# Patient Record
Sex: FEMALE | Race: WHITE | NOT HISPANIC OR LATINO | Employment: OTHER | URBAN - METROPOLITAN AREA
[De-identification: names, ages, dates, MRNs, and addresses within clinical notes are randomized per-mention and may not be internally consistent; named-entity substitution may affect disease eponyms.]

---

## 2017-01-03 ENCOUNTER — RECORDS - HEALTHEAST (OUTPATIENT)
Dept: BONE DENSITY | Facility: CLINIC | Age: 67
End: 2017-01-03

## 2017-01-03 ENCOUNTER — AMBULATORY - HEALTHEAST (OUTPATIENT)
Dept: LAB | Facility: CLINIC | Age: 67
End: 2017-01-03

## 2017-01-03 DIAGNOSIS — Z51.81 MEDICATION MONITORING ENCOUNTER: ICD-10-CM

## 2017-01-03 DIAGNOSIS — M85.80 OTHER SPECIFIED DISORDERS OF BONE DENSITY AND STRUCTURE, UNSPECIFIED SITE: ICD-10-CM

## 2017-01-03 DIAGNOSIS — E78.00 HYPERCHOLESTEROLEMIA: ICD-10-CM

## 2017-01-03 DIAGNOSIS — E03.9 ACQUIRED HYPOTHYROIDISM: ICD-10-CM

## 2017-01-03 DIAGNOSIS — E55.9 VITAMIN D DEFICIENCY: ICD-10-CM

## 2017-01-03 LAB
CHOLEST SERPL-MCNC: 153 MG/DL
FASTING STATUS PATIENT QL REPORTED: YES
HDLC SERPL-MCNC: 52 MG/DL
LDLC SERPL CALC-MCNC: 71 MG/DL
TRIGL SERPL-MCNC: 152 MG/DL

## 2017-01-09 ENCOUNTER — COMMUNICATION - HEALTHEAST (OUTPATIENT)
Dept: RHEUMATOLOGY | Facility: CLINIC | Age: 67
End: 2017-01-09

## 2017-01-30 ENCOUNTER — OFFICE VISIT - HEALTHEAST (OUTPATIENT)
Dept: RHEUMATOLOGY | Facility: CLINIC | Age: 67
End: 2017-01-30

## 2017-01-30 DIAGNOSIS — M15.0 PRIMARY OSTEOARTHRITIS INVOLVING MULTIPLE JOINTS: ICD-10-CM

## 2017-01-30 DIAGNOSIS — M11.20 CHONDROCALCINOSIS: ICD-10-CM

## 2017-01-30 DIAGNOSIS — M70.62 GREATER TROCHANTERIC BURSITIS OF BOTH HIPS: ICD-10-CM

## 2017-01-30 DIAGNOSIS — M70.61 GREATER TROCHANTERIC BURSITIS OF BOTH HIPS: ICD-10-CM

## 2017-01-30 DIAGNOSIS — M25.50 PAIN IN JOINT: ICD-10-CM

## 2017-02-04 ENCOUNTER — COMMUNICATION - HEALTHEAST (OUTPATIENT)
Dept: RHEUMATOLOGY | Facility: CLINIC | Age: 67
End: 2017-02-04

## 2017-02-04 DIAGNOSIS — M15.0 PRIMARY OSTEOARTHRITIS INVOLVING MULTIPLE JOINTS: ICD-10-CM

## 2017-02-04 DIAGNOSIS — M25.50 PAIN IN JOINT: ICD-10-CM

## 2017-02-13 ENCOUNTER — COMMUNICATION - HEALTHEAST (OUTPATIENT)
Dept: INTERNAL MEDICINE | Facility: CLINIC | Age: 67
End: 2017-02-13

## 2017-02-23 ENCOUNTER — COMMUNICATION - HEALTHEAST (OUTPATIENT)
Dept: INTERNAL MEDICINE | Facility: CLINIC | Age: 67
End: 2017-02-23

## 2017-02-23 DIAGNOSIS — Z51.81 MEDICATION MONITORING ENCOUNTER: ICD-10-CM

## 2017-03-05 ENCOUNTER — COMMUNICATION - HEALTHEAST (OUTPATIENT)
Dept: INTERNAL MEDICINE | Facility: CLINIC | Age: 67
End: 2017-03-05

## 2017-03-05 DIAGNOSIS — I10 HYPERTENSION, ESSENTIAL: ICD-10-CM

## 2017-03-05 DIAGNOSIS — E03.9 ACQUIRED HYPOTHYROIDISM: ICD-10-CM

## 2017-04-07 ENCOUNTER — COMMUNICATION - HEALTHEAST (OUTPATIENT)
Dept: INTERNAL MEDICINE | Facility: CLINIC | Age: 67
End: 2017-04-07

## 2017-04-13 ENCOUNTER — COMMUNICATION - HEALTHEAST (OUTPATIENT)
Dept: INTERNAL MEDICINE | Facility: CLINIC | Age: 67
End: 2017-04-13

## 2017-04-26 ENCOUNTER — COMMUNICATION - HEALTHEAST (OUTPATIENT)
Dept: RHEUMATOLOGY | Facility: CLINIC | Age: 67
End: 2017-04-26

## 2017-04-26 DIAGNOSIS — M25.50 PAIN IN JOINT: ICD-10-CM

## 2017-04-26 DIAGNOSIS — M15.0 PRIMARY OSTEOARTHRITIS INVOLVING MULTIPLE JOINTS: ICD-10-CM

## 2017-05-01 ENCOUNTER — OFFICE VISIT - HEALTHEAST (OUTPATIENT)
Dept: RHEUMATOLOGY | Facility: CLINIC | Age: 67
End: 2017-05-01

## 2017-05-01 DIAGNOSIS — M15.0 PRIMARY OSTEOARTHRITIS INVOLVING MULTIPLE JOINTS: ICD-10-CM

## 2017-05-01 DIAGNOSIS — I25.10 CORONARY ATHEROSCLEROSIS: ICD-10-CM

## 2017-05-01 DIAGNOSIS — M70.62 GREATER TROCHANTERIC BURSITIS OF BOTH HIPS: ICD-10-CM

## 2017-05-01 DIAGNOSIS — M70.61 GREATER TROCHANTERIC BURSITIS OF BOTH HIPS: ICD-10-CM

## 2017-05-01 ASSESSMENT — MIFFLIN-ST. JEOR: SCORE: 1155.56

## 2017-05-16 ENCOUNTER — COMMUNICATION - HEALTHEAST (OUTPATIENT)
Dept: RHEUMATOLOGY | Facility: CLINIC | Age: 67
End: 2017-05-16

## 2017-05-16 DIAGNOSIS — M15.0 PRIMARY OSTEOARTHRITIS INVOLVING MULTIPLE JOINTS: ICD-10-CM

## 2017-05-18 ENCOUNTER — COMMUNICATION - HEALTHEAST (OUTPATIENT)
Dept: INTERNAL MEDICINE | Facility: CLINIC | Age: 67
End: 2017-05-18

## 2017-05-18 DIAGNOSIS — Z51.81 MEDICATION MONITORING ENCOUNTER: ICD-10-CM

## 2017-05-23 ENCOUNTER — COMMUNICATION - HEALTHEAST (OUTPATIENT)
Dept: INTERNAL MEDICINE | Facility: CLINIC | Age: 67
End: 2017-05-23

## 2017-05-23 DIAGNOSIS — Z51.81 MEDICATION MONITORING ENCOUNTER: ICD-10-CM

## 2017-05-28 ENCOUNTER — COMMUNICATION - HEALTHEAST (OUTPATIENT)
Dept: INTERNAL MEDICINE | Facility: CLINIC | Age: 67
End: 2017-05-28

## 2017-05-28 DIAGNOSIS — I10 HYPERTENSION, ESSENTIAL: ICD-10-CM

## 2017-05-28 DIAGNOSIS — E03.9 ACQUIRED HYPOTHYROIDISM: ICD-10-CM

## 2017-05-28 DIAGNOSIS — Z51.81 MEDICATION MONITORING ENCOUNTER: ICD-10-CM

## 2017-05-30 ENCOUNTER — COMMUNICATION - HEALTHEAST (OUTPATIENT)
Dept: INTERNAL MEDICINE | Facility: CLINIC | Age: 67
End: 2017-05-30

## 2017-05-30 DIAGNOSIS — Z51.81 MEDICATION MONITORING ENCOUNTER: ICD-10-CM

## 2017-06-01 ENCOUNTER — COMMUNICATION - HEALTHEAST (OUTPATIENT)
Dept: INTERNAL MEDICINE | Facility: CLINIC | Age: 67
End: 2017-06-01

## 2017-06-01 DIAGNOSIS — E03.9 ACQUIRED HYPOTHYROIDISM: ICD-10-CM

## 2017-06-01 DIAGNOSIS — I10 HYPERTENSION, ESSENTIAL: ICD-10-CM

## 2017-06-01 DIAGNOSIS — Z51.81 MEDICATION MONITORING ENCOUNTER: ICD-10-CM

## 2017-06-14 ENCOUNTER — OFFICE VISIT - HEALTHEAST (OUTPATIENT)
Dept: INTERNAL MEDICINE | Facility: CLINIC | Age: 67
End: 2017-06-14

## 2017-06-14 DIAGNOSIS — R19.7 DIARRHEA: ICD-10-CM

## 2017-06-14 ASSESSMENT — MIFFLIN-ST. JEOR: SCORE: 1124.94

## 2017-06-15 ENCOUNTER — AMBULATORY - HEALTHEAST (OUTPATIENT)
Dept: INTERNAL MEDICINE | Facility: CLINIC | Age: 67
End: 2017-06-15

## 2017-06-15 ENCOUNTER — COMMUNICATION - HEALTHEAST (OUTPATIENT)
Dept: INTERNAL MEDICINE | Facility: CLINIC | Age: 67
End: 2017-06-15

## 2017-06-15 DIAGNOSIS — E03.9 ACQUIRED HYPOTHYROIDISM: ICD-10-CM

## 2017-06-27 ENCOUNTER — RECORDS - HEALTHEAST (OUTPATIENT)
Dept: ADMINISTRATIVE | Facility: OTHER | Age: 67
End: 2017-06-27

## 2017-08-22 ENCOUNTER — COMMUNICATION - HEALTHEAST (OUTPATIENT)
Dept: INTERNAL MEDICINE | Facility: CLINIC | Age: 67
End: 2017-08-22

## 2017-08-22 DIAGNOSIS — Z51.81 MEDICATION MONITORING ENCOUNTER: ICD-10-CM

## 2017-08-28 ENCOUNTER — RECORDS - HEALTHEAST (OUTPATIENT)
Dept: ADMINISTRATIVE | Facility: OTHER | Age: 67
End: 2017-08-28

## 2017-09-11 ENCOUNTER — AMBULATORY - HEALTHEAST (OUTPATIENT)
Dept: LAB | Facility: CLINIC | Age: 67
End: 2017-09-11

## 2017-09-11 DIAGNOSIS — E03.9 ACQUIRED HYPOTHYROIDISM: ICD-10-CM

## 2017-10-05 ENCOUNTER — COMMUNICATION - HEALTHEAST (OUTPATIENT)
Dept: SCHEDULING | Facility: CLINIC | Age: 67
End: 2017-10-05

## 2017-10-05 ENCOUNTER — OFFICE VISIT - HEALTHEAST (OUTPATIENT)
Dept: INTERNAL MEDICINE | Facility: CLINIC | Age: 67
End: 2017-10-05

## 2017-10-05 DIAGNOSIS — R00.2 PALPITATIONS: ICD-10-CM

## 2017-10-05 ASSESSMENT — MIFFLIN-ST. JEOR: SCORE: 1129.47

## 2017-10-10 ENCOUNTER — HOSPITAL ENCOUNTER (OUTPATIENT)
Dept: CARDIOLOGY | Facility: HOSPITAL | Age: 67
Discharge: HOME | End: 2017-10-10
Attending: INTERNAL MEDICINE

## 2017-10-10 DIAGNOSIS — R00.2 PALPITATIONS: ICD-10-CM

## 2017-10-11 ENCOUNTER — COMMUNICATION - HEALTHEAST (OUTPATIENT)
Dept: INTERNAL MEDICINE | Facility: CLINIC | Age: 67
End: 2017-10-11

## 2017-10-26 ENCOUNTER — COMMUNICATION - HEALTHEAST (OUTPATIENT)
Dept: RHEUMATOLOGY | Facility: CLINIC | Age: 67
End: 2017-10-26

## 2017-10-26 ENCOUNTER — COMMUNICATION - HEALTHEAST (OUTPATIENT)
Dept: INTERNAL MEDICINE | Facility: CLINIC | Age: 67
End: 2017-10-26

## 2017-10-26 DIAGNOSIS — Z51.81 MEDICATION MONITORING ENCOUNTER: ICD-10-CM

## 2017-10-26 DIAGNOSIS — M15.0 PRIMARY OSTEOARTHRITIS INVOLVING MULTIPLE JOINTS: ICD-10-CM

## 2017-10-31 ENCOUNTER — OFFICE VISIT - HEALTHEAST (OUTPATIENT)
Dept: RHEUMATOLOGY | Facility: CLINIC | Age: 67
End: 2017-10-31

## 2017-10-31 DIAGNOSIS — I25.10 CORONARY ATHEROSCLEROSIS: ICD-10-CM

## 2017-10-31 DIAGNOSIS — M25.50 PAIN IN JOINT: ICD-10-CM

## 2017-10-31 DIAGNOSIS — M15.0 PRIMARY OSTEOARTHRITIS INVOLVING MULTIPLE JOINTS: ICD-10-CM

## 2017-10-31 DIAGNOSIS — M11.20 CHONDROCALCINOSIS: ICD-10-CM

## 2017-10-31 ASSESSMENT — MIFFLIN-ST. JEOR: SCORE: 1125.84

## 2017-11-08 ENCOUNTER — COMMUNICATION - HEALTHEAST (OUTPATIENT)
Dept: INTERNAL MEDICINE | Facility: CLINIC | Age: 67
End: 2017-11-08

## 2017-11-08 DIAGNOSIS — E03.9 ACQUIRED HYPOTHYROIDISM: ICD-10-CM

## 2017-12-15 ENCOUNTER — RECORDS - HEALTHEAST (OUTPATIENT)
Dept: ADMINISTRATIVE | Facility: OTHER | Age: 67
End: 2017-12-15

## 2017-12-18 ENCOUNTER — RECORDS - HEALTHEAST (OUTPATIENT)
Dept: ADMINISTRATIVE | Facility: OTHER | Age: 67
End: 2017-12-18

## 2017-12-29 ENCOUNTER — COMMUNICATION - HEALTHEAST (OUTPATIENT)
Dept: INTERNAL MEDICINE | Facility: CLINIC | Age: 67
End: 2017-12-29

## 2018-01-09 ENCOUNTER — AMBULATORY - HEALTHEAST (OUTPATIENT)
Dept: INTERNAL MEDICINE | Facility: CLINIC | Age: 68
End: 2018-01-09

## 2018-01-09 ENCOUNTER — COMMUNICATION - HEALTHEAST (OUTPATIENT)
Dept: INTERNAL MEDICINE | Facility: CLINIC | Age: 68
End: 2018-01-09

## 2018-01-09 DIAGNOSIS — I25.10 CORONARY ARTERY DISEASE: ICD-10-CM

## 2018-01-09 DIAGNOSIS — I21.A9 OTHER TYPE OF MYOCARDIAL INFARCTION (H): ICD-10-CM

## 2018-01-09 DIAGNOSIS — M85.80 LOW BONE MASS: ICD-10-CM

## 2018-01-09 DIAGNOSIS — E78.00 HYPERCHOLESTEROLEMIA: ICD-10-CM

## 2018-01-09 DIAGNOSIS — Z51.81 MEDICATION MONITORING ENCOUNTER: ICD-10-CM

## 2018-01-09 DIAGNOSIS — E03.9 ACQUIRED HYPOTHYROIDISM: ICD-10-CM

## 2018-01-22 ENCOUNTER — AMBULATORY - HEALTHEAST (OUTPATIENT)
Dept: LAB | Facility: CLINIC | Age: 68
End: 2018-01-22

## 2018-01-22 DIAGNOSIS — E78.00 HYPERCHOLESTEROLEMIA: ICD-10-CM

## 2018-01-22 DIAGNOSIS — I21.A9 OTHER TYPE OF MYOCARDIAL INFARCTION (H): ICD-10-CM

## 2018-01-22 DIAGNOSIS — E03.9 ACQUIRED HYPOTHYROIDISM: ICD-10-CM

## 2018-01-22 DIAGNOSIS — M85.80 LOW BONE MASS: ICD-10-CM

## 2018-01-22 DIAGNOSIS — I25.10 CORONARY ARTERY DISEASE: ICD-10-CM

## 2018-01-22 LAB
ALBUMIN SERPL-MCNC: 3.7 G/DL (ref 3.5–5)
ALP SERPL-CCNC: 47 U/L (ref 45–120)
ALT SERPL W P-5'-P-CCNC: 24 U/L (ref 0–45)
ANION GAP SERPL CALCULATED.3IONS-SCNC: 10 MMOL/L (ref 5–18)
AST SERPL W P-5'-P-CCNC: 22 U/L (ref 0–40)
BILIRUB SERPL-MCNC: 0.5 MG/DL (ref 0–1)
BUN SERPL-MCNC: 19 MG/DL (ref 8–22)
CALCIUM SERPL-MCNC: 8.8 MG/DL (ref 8.5–10.5)
CHLORIDE BLD-SCNC: 107 MMOL/L (ref 98–107)
CHOLEST SERPL-MCNC: 158 MG/DL
CO2 SERPL-SCNC: 24 MMOL/L (ref 22–31)
CREAT SERPL-MCNC: 0.71 MG/DL (ref 0.6–1.1)
ERYTHROCYTE [DISTWIDTH] IN BLOOD BY AUTOMATED COUNT: 11.4 % (ref 11–14.5)
FASTING STATUS PATIENT QL REPORTED: YES
GFR SERPL CREATININE-BSD FRML MDRD: >60 ML/MIN/1.73M2
GLUCOSE BLD-MCNC: 100 MG/DL (ref 70–125)
HBA1C MFR BLD: 5.8 % (ref 3.5–6)
HCT VFR BLD AUTO: 40.7 % (ref 35–47)
HDLC SERPL-MCNC: 62 MG/DL
HGB BLD-MCNC: 13.6 G/DL (ref 12–16)
LDLC SERPL CALC-MCNC: 69 MG/DL
MCH RBC QN AUTO: 31.2 PG (ref 27–34)
MCHC RBC AUTO-ENTMCNC: 33.4 G/DL (ref 32–36)
MCV RBC AUTO: 94 FL (ref 80–100)
PLATELET # BLD AUTO: 171 THOU/UL (ref 140–440)
PMV BLD AUTO: 8.1 FL (ref 7–10)
POTASSIUM BLD-SCNC: 4.6 MMOL/L (ref 3.5–5)
PROT SERPL-MCNC: 6.4 G/DL (ref 6–8)
RBC # BLD AUTO: 4.34 MILL/UL (ref 3.8–5.4)
SODIUM SERPL-SCNC: 141 MMOL/L (ref 136–145)
TRIGL SERPL-MCNC: 137 MG/DL
TSH SERPL DL<=0.005 MIU/L-ACNC: 1.44 UIU/ML (ref 0.3–5)
WBC: 5 THOU/UL (ref 4–11)

## 2018-01-23 LAB
25(OH)D3 SERPL-MCNC: 41.5 NG/ML (ref 30–80)
25(OH)D3 SERPL-MCNC: 41.5 NG/ML (ref 30–80)

## 2018-01-24 ENCOUNTER — OFFICE VISIT - HEALTHEAST (OUTPATIENT)
Dept: INTERNAL MEDICINE | Facility: CLINIC | Age: 68
End: 2018-01-24

## 2018-01-24 DIAGNOSIS — R00.2 PALPITATIONS: ICD-10-CM

## 2018-01-24 DIAGNOSIS — I25.10 CORONARY ATHEROSCLEROSIS: ICD-10-CM

## 2018-01-24 DIAGNOSIS — Z00.00 PREVENTATIVE HEALTH CARE: ICD-10-CM

## 2018-01-24 DIAGNOSIS — I10 ESSENTIAL HYPERTENSION: ICD-10-CM

## 2018-01-24 DIAGNOSIS — E78.00 HYPERCHOLESTEROLEMIA: ICD-10-CM

## 2018-01-24 DIAGNOSIS — M94.9 DISORDER OF BONE AND CARTILAGE: ICD-10-CM

## 2018-01-24 DIAGNOSIS — E03.9 ACQUIRED HYPOTHYROIDISM: ICD-10-CM

## 2018-01-24 DIAGNOSIS — I77.9 CAROTID ARTERY DISEASE, UNSPECIFIED LATERALITY (H): ICD-10-CM

## 2018-01-24 DIAGNOSIS — Z00.00 ROUTINE HEALTH MAINTENANCE: ICD-10-CM

## 2018-01-24 DIAGNOSIS — M89.9 DISORDER OF BONE AND CARTILAGE: ICD-10-CM

## 2018-01-24 ASSESSMENT — MIFFLIN-ST. JEOR: SCORE: 1124.37

## 2018-03-01 ENCOUNTER — HOSPITAL ENCOUNTER (OUTPATIENT)
Dept: MAMMOGRAPHY | Facility: HOSPITAL | Age: 68
Discharge: HOME | End: 2018-03-01
Attending: INTERNAL MEDICINE

## 2018-03-01 ENCOUNTER — HOSPITAL ENCOUNTER (OUTPATIENT)
Dept: CARDIOLOGY | Facility: HOSPITAL | Age: 68
Discharge: HOME | End: 2018-03-01
Attending: INTERNAL MEDICINE

## 2018-03-01 DIAGNOSIS — I25.10 CORONARY ATHEROSCLEROSIS: ICD-10-CM

## 2018-03-01 DIAGNOSIS — Z12.31 VISIT FOR SCREENING MAMMOGRAM: ICD-10-CM

## 2018-03-01 DIAGNOSIS — R00.2 PALPITATIONS: ICD-10-CM

## 2018-03-01 LAB
AORTIC ROOT: 2.9 CM
BSA FOR ECHO PROCEDURE: 1.71 M2
CV BLOOD PRESSURE: NORMAL MMHG
CV ECHO HEIGHT: 61 IN
CV ECHO WEIGHT: 150 LBS
DOP CALC LVOT AREA: 2.54 CM2
DOP CALC LVOT DIAMETER: 1.8 CM
DOP CALC LVOT STROKE VOLUME: 46.3 CM3
DOP CALCLVOT PEAK VEL VTI: 18.2 CM
EJECTION FRACTION: 63 % (ref 55–75)
FRACTIONAL SHORTENING: 38.3 % (ref 28–44)
INTERVENTRICULAR SEPTUM IN END DIASTOLE: 1 CM (ref 0.6–0.9)
IVS/PW RATIO: 1
LA AREA 1: 17.7 CM2
LA AREA 2: 17.3 CM2
LEFT ATRIUM LENGTH: 4.67 CM
LEFT ATRIUM SIZE: 3.4 CM
LEFT ATRIUM TO AORTIC ROOT RATIO: 1.17 NO UNITS
LEFT ATRIUM VOLUME INDEX: 32.6 ML/M2
LEFT ATRIUM VOLUME: 55.7 ML
LEFT VENTRICLE CARDIAC INDEX: 2.1 L/MIN/M2
LEFT VENTRICLE CARDIAC OUTPUT: 3.6 L/MIN
LEFT VENTRICLE DIASTOLIC VOLUME INDEX: 42.7 CM3/M2 (ref 34–74)
LEFT VENTRICLE DIASTOLIC VOLUME: 73 CM3 (ref 46–106)
LEFT VENTRICLE HEART RATE: 77 BPM
LEFT VENTRICLE MASS INDEX: 96.2 G/M2
LEFT VENTRICLE SYSTOLIC VOLUME INDEX: 15.8 CM3/M2 (ref 11–31)
LEFT VENTRICLE SYSTOLIC VOLUME: 27 CM3 (ref 14–42)
LEFT VENTRICULAR INTERNAL DIMENSION IN DIASTOLE: 4.7 CM (ref 3.8–5.2)
LEFT VENTRICULAR INTERNAL DIMENSION IN SYSTOLE: 2.9 CM (ref 2.2–3.5)
LEFT VENTRICULAR MASS: 164.5 G
LEFT VENTRICULAR OUTFLOW TRACT MEAN GRADIENT: 1 MMHG
LEFT VENTRICULAR OUTFLOW TRACT MEAN VELOCITY: 55.7 CM/S
LEFT VENTRICULAR POSTERIOR WALL IN END DIASTOLE: 1 CM (ref 0.6–0.9)
LV STROKE VOLUME INDEX: 27.1 ML/M2
MITRAL VALVE E/A RATIO: 1
MV AVERAGE E/E' RATIO: 14.2 CM/S
MV DECELERATION TIME: 234 MS
MV E'TISSUE VEL-LAT: 8.33 CM/S
MV E'TISSUE VEL-MED: 6.43 CM/S
MV LATERAL E/E' RATIO: 12.6
MV MEDIAL E/E' RATIO: 16.3
MV PEAK A VELOCITY: 108 CM/S
MV PEAK E VELOCITY: 105 CM/S
NUC REST DIASTOLIC VOLUME INDEX: 2400 LBS
NUC REST SYSTOLIC VOLUME INDEX: 61 IN
RIGHT VENTRICULAR INTERNAL DIMENSION IN DYSTOLE: 3 CM
TRICUSPID REGURGITATION PEAK PRESSURE GRADIENT: 30 MMHG
TRICUSPID VALVE ANULAR PLANE SYSTOLIC EXCURSION: 2.1 CM
TRICUSPID VALVE PEAK REGURGITANT VELOCITY: 274 CM/S

## 2018-03-01 ASSESSMENT — MIFFLIN-ST. JEOR: SCORE: 1127.78

## 2018-04-04 ENCOUNTER — RECORDS - HEALTHEAST (OUTPATIENT)
Dept: ADMINISTRATIVE | Facility: OTHER | Age: 68
End: 2018-04-04

## 2018-04-25 ENCOUNTER — RECORDS - HEALTHEAST (OUTPATIENT)
Dept: ADMINISTRATIVE | Facility: OTHER | Age: 68
End: 2018-04-25

## 2018-04-30 ENCOUNTER — RECORDS - HEALTHEAST (OUTPATIENT)
Dept: ADMINISTRATIVE | Facility: OTHER | Age: 68
End: 2018-04-30

## 2018-05-07 ENCOUNTER — COMMUNICATION - HEALTHEAST (OUTPATIENT)
Dept: INTERNAL MEDICINE | Facility: CLINIC | Age: 68
End: 2018-05-07

## 2018-05-29 ENCOUNTER — HOSPITAL ENCOUNTER (OUTPATIENT)
Dept: CT IMAGING | Facility: CLINIC | Age: 68
Discharge: HOME | End: 2018-05-29
Attending: INTERNAL MEDICINE

## 2018-05-29 ENCOUNTER — COMMUNICATION - HEALTHEAST (OUTPATIENT)
Dept: INTERNAL MEDICINE | Facility: CLINIC | Age: 68
End: 2018-05-29

## 2018-05-29 ENCOUNTER — OFFICE VISIT - HEALTHEAST (OUTPATIENT)
Dept: INTERNAL MEDICINE | Facility: CLINIC | Age: 68
End: 2018-05-29

## 2018-05-29 DIAGNOSIS — R10.9 LEFT FLANK PAIN: ICD-10-CM

## 2018-05-29 LAB
ALBUMIN UR-MCNC: NEGATIVE MG/DL
ANION GAP SERPL CALCULATED.3IONS-SCNC: 10 MMOL/L (ref 5–18)
APPEARANCE UR: CLEAR
BACTERIA #/AREA URNS HPF: ABNORMAL HPF
BILIRUB UR QL STRIP: NEGATIVE
BUN SERPL-MCNC: 15 MG/DL (ref 8–22)
CALCIUM SERPL-MCNC: 10 MG/DL (ref 8.5–10.5)
CHLORIDE BLD-SCNC: 106 MMOL/L (ref 98–107)
CO2 SERPL-SCNC: 24 MMOL/L (ref 22–31)
COLOR UR AUTO: YELLOW
CREAT SERPL-MCNC: 0.73 MG/DL (ref 0.6–1.1)
ERYTHROCYTE [DISTWIDTH] IN BLOOD BY AUTOMATED COUNT: 10.8 % (ref 11–14.5)
GFR SERPL CREATININE-BSD FRML MDRD: >60 ML/MIN/1.73M2
GLUCOSE BLD-MCNC: 108 MG/DL (ref 70–125)
GLUCOSE UR STRIP-MCNC: NEGATIVE MG/DL
HCT VFR BLD AUTO: 43.9 % (ref 35–47)
HGB BLD-MCNC: 14.9 G/DL (ref 12–16)
HGB UR QL STRIP: ABNORMAL
KETONES UR STRIP-MCNC: NEGATIVE MG/DL
LEUKOCYTE ESTERASE UR QL STRIP: NEGATIVE
MCH RBC QN AUTO: 32.2 PG (ref 27–34)
MCHC RBC AUTO-ENTMCNC: 34 G/DL (ref 32–36)
MCV RBC AUTO: 95 FL (ref 80–100)
NITRATE UR QL: NEGATIVE
PH UR STRIP: 6 [PH] (ref 5–8)
PLATELET # BLD AUTO: 208 THOU/UL (ref 140–440)
PMV BLD AUTO: 7.9 FL (ref 7–10)
POTASSIUM BLD-SCNC: 4.7 MMOL/L (ref 3.5–5)
RBC # BLD AUTO: 4.63 MILL/UL (ref 3.8–5.4)
RBC #/AREA URNS AUTO: ABNORMAL HPF
SODIUM SERPL-SCNC: 140 MMOL/L (ref 136–145)
SP GR UR STRIP: 1.02 (ref 1–1.03)
UROBILINOGEN UR STRIP-ACNC: ABNORMAL
WBC #/AREA URNS AUTO: ABNORMAL HPF
WBC: 4.5 THOU/UL (ref 4–11)

## 2018-05-31 ENCOUNTER — RECORDS - HEALTHEAST (OUTPATIENT)
Dept: ADMINISTRATIVE | Facility: OTHER | Age: 68
End: 2018-05-31

## 2018-06-05 ENCOUNTER — COMMUNICATION - HEALTHEAST (OUTPATIENT)
Dept: INTERNAL MEDICINE | Facility: CLINIC | Age: 68
End: 2018-06-05

## 2018-06-18 ENCOUNTER — RECORDS - HEALTHEAST (OUTPATIENT)
Dept: ADMINISTRATIVE | Facility: OTHER | Age: 68
End: 2018-06-18

## 2018-06-19 ENCOUNTER — COMMUNICATION - HEALTHEAST (OUTPATIENT)
Dept: INTERNAL MEDICINE | Facility: CLINIC | Age: 68
End: 2018-06-19

## 2018-06-19 ENCOUNTER — RECORDS - HEALTHEAST (OUTPATIENT)
Dept: ADMINISTRATIVE | Facility: OTHER | Age: 68
End: 2018-06-19

## 2018-06-20 ENCOUNTER — COMMUNICATION - HEALTHEAST (OUTPATIENT)
Dept: INTERNAL MEDICINE | Facility: CLINIC | Age: 68
End: 2018-06-20

## 2018-06-20 ENCOUNTER — RECORDS - HEALTHEAST (OUTPATIENT)
Dept: ADMINISTRATIVE | Facility: OTHER | Age: 68
End: 2018-06-20

## 2018-07-10 ENCOUNTER — RECORDS - HEALTHEAST (OUTPATIENT)
Dept: ADMINISTRATIVE | Facility: OTHER | Age: 68
End: 2018-07-10

## 2018-07-16 ENCOUNTER — COMMUNICATION - HEALTHEAST (OUTPATIENT)
Dept: INTERNAL MEDICINE | Facility: CLINIC | Age: 68
End: 2018-07-16

## 2018-08-26 ENCOUNTER — COMMUNICATION - HEALTHEAST (OUTPATIENT)
Dept: INTERNAL MEDICINE | Facility: CLINIC | Age: 68
End: 2018-08-26

## 2018-10-25 ENCOUNTER — COMMUNICATION - HEALTHEAST (OUTPATIENT)
Dept: INTERNAL MEDICINE | Facility: CLINIC | Age: 68
End: 2018-10-25

## 2018-10-29 ENCOUNTER — OFFICE VISIT - HEALTHEAST (OUTPATIENT)
Dept: INTERNAL MEDICINE | Facility: CLINIC | Age: 68
End: 2018-10-29

## 2018-10-29 DIAGNOSIS — R10.84 ABDOMINAL PAIN, GENERALIZED: ICD-10-CM

## 2018-10-29 DIAGNOSIS — M54.50 ACUTE LEFT-SIDED LOW BACK PAIN WITHOUT SCIATICA: ICD-10-CM

## 2018-10-29 ASSESSMENT — MIFFLIN-ST. JEOR: SCORE: 1118.7

## 2018-11-02 ENCOUNTER — COMMUNICATION - HEALTHEAST (OUTPATIENT)
Dept: INTERNAL MEDICINE | Facility: CLINIC | Age: 68
End: 2018-11-02

## 2018-11-07 ENCOUNTER — RECORDS - HEALTHEAST (OUTPATIENT)
Dept: ADMINISTRATIVE | Facility: OTHER | Age: 68
End: 2018-11-07

## 2018-12-14 ENCOUNTER — COMMUNICATION - HEALTHEAST (OUTPATIENT)
Dept: INTERNAL MEDICINE | Facility: CLINIC | Age: 68
End: 2018-12-14

## 2018-12-14 DIAGNOSIS — Z79.899 ENCOUNTER FOR LONG-TERM (CURRENT) USE OF MEDICATIONS: ICD-10-CM

## 2018-12-14 DIAGNOSIS — Z00.00 PREVENTATIVE HEALTH CARE: ICD-10-CM

## 2018-12-14 DIAGNOSIS — E03.9 ACQUIRED HYPOTHYROIDISM: ICD-10-CM

## 2019-01-02 ENCOUNTER — COMMUNICATION - HEALTHEAST (OUTPATIENT)
Dept: INTERNAL MEDICINE | Facility: CLINIC | Age: 69
End: 2019-01-02

## 2019-01-16 ENCOUNTER — AMBULATORY - HEALTHEAST (OUTPATIENT)
Dept: LAB | Facility: CLINIC | Age: 69
End: 2019-01-16

## 2019-01-16 DIAGNOSIS — Z00.00 PREVENTATIVE HEALTH CARE: ICD-10-CM

## 2019-01-16 DIAGNOSIS — Z79.899 ENCOUNTER FOR LONG-TERM (CURRENT) USE OF MEDICATIONS: ICD-10-CM

## 2019-01-16 DIAGNOSIS — E03.9 ACQUIRED HYPOTHYROIDISM: ICD-10-CM

## 2019-01-16 LAB
ALBUMIN SERPL-MCNC: 4.1 G/DL (ref 3.5–5)
ALP SERPL-CCNC: 70 U/L (ref 45–120)
ALT SERPL W P-5'-P-CCNC: 48 U/L (ref 0–45)
ANION GAP SERPL CALCULATED.3IONS-SCNC: 11 MMOL/L (ref 5–18)
AST SERPL W P-5'-P-CCNC: 44 U/L (ref 0–40)
BILIRUB SERPL-MCNC: 0.5 MG/DL (ref 0–1)
BUN SERPL-MCNC: 17 MG/DL (ref 8–28)
CALCIUM SERPL-MCNC: 9.2 MG/DL (ref 8.5–10.5)
CHLORIDE BLD-SCNC: 103 MMOL/L (ref 98–107)
CHOLEST SERPL-MCNC: 148 MG/DL
CO2 SERPL-SCNC: 24 MMOL/L (ref 22–31)
CREAT SERPL-MCNC: 0.73 MG/DL (ref 0.6–1.1)
ERYTHROCYTE [DISTWIDTH] IN BLOOD BY AUTOMATED COUNT: 10.6 % (ref 11–14.5)
FASTING STATUS PATIENT QL REPORTED: YES
GFR SERPL CREATININE-BSD FRML MDRD: >60 ML/MIN/1.73M2
GLUCOSE BLD-MCNC: 107 MG/DL (ref 70–125)
HBA1C MFR BLD: 5.7 % (ref 3.5–6)
HCT VFR BLD AUTO: 42.1 % (ref 35–47)
HDLC SERPL-MCNC: 59 MG/DL
HGB BLD-MCNC: 14.1 G/DL (ref 12–16)
LDLC SERPL CALC-MCNC: 64 MG/DL
MCH RBC QN AUTO: 31.2 PG (ref 27–34)
MCHC RBC AUTO-ENTMCNC: 33.4 G/DL (ref 32–36)
MCV RBC AUTO: 93 FL (ref 80–100)
PLATELET # BLD AUTO: 146 THOU/UL (ref 140–440)
PMV BLD AUTO: 9.1 FL (ref 7–10)
POTASSIUM BLD-SCNC: 4.1 MMOL/L (ref 3.5–5)
PROT SERPL-MCNC: 6.7 G/DL (ref 6–8)
RBC # BLD AUTO: 4.5 MILL/UL (ref 3.8–5.4)
SODIUM SERPL-SCNC: 138 MMOL/L (ref 136–145)
TRIGL SERPL-MCNC: 123 MG/DL
TSH SERPL DL<=0.005 MIU/L-ACNC: 0.34 UIU/ML (ref 0.3–5)
WBC: 3.6 THOU/UL (ref 4–11)

## 2019-01-17 LAB
25(OH)D3 SERPL-MCNC: 29.4 NG/ML (ref 30–80)
25(OH)D3 SERPL-MCNC: 29.4 NG/ML (ref 30–80)

## 2019-01-22 ENCOUNTER — OFFICE VISIT - HEALTHEAST (OUTPATIENT)
Dept: INTERNAL MEDICINE | Facility: CLINIC | Age: 69
End: 2019-01-22

## 2019-01-22 DIAGNOSIS — E55.9 VITAMIN D DEFICIENCY: ICD-10-CM

## 2019-01-22 DIAGNOSIS — Z00.00 PREVENTATIVE HEALTH CARE: ICD-10-CM

## 2019-01-22 DIAGNOSIS — E78.00 HYPERCHOLESTEROLEMIA: ICD-10-CM

## 2019-01-22 DIAGNOSIS — I10 ESSENTIAL HYPERTENSION: ICD-10-CM

## 2019-01-22 DIAGNOSIS — M94.9 DISORDER OF BONE AND CARTILAGE: ICD-10-CM

## 2019-01-22 DIAGNOSIS — M89.9 DISORDER OF BONE AND CARTILAGE: ICD-10-CM

## 2019-01-22 DIAGNOSIS — I65.29 STENOSIS OF CAROTID ARTERY, UNSPECIFIED LATERALITY: ICD-10-CM

## 2019-01-22 DIAGNOSIS — J20.9 ACUTE BRONCHITIS, UNSPECIFIED ORGANISM: ICD-10-CM

## 2019-01-22 DIAGNOSIS — R79.89 ELEVATED LFTS: ICD-10-CM

## 2019-01-22 DIAGNOSIS — E03.9 ACQUIRED HYPOTHYROIDISM: ICD-10-CM

## 2019-01-22 DIAGNOSIS — G89.29 OTHER CHRONIC PAIN: ICD-10-CM

## 2019-01-22 DIAGNOSIS — Z78.0 MENOPAUSE: ICD-10-CM

## 2019-01-22 LAB
ALBUMIN SERPL-MCNC: 3.9 G/DL (ref 3.5–5)
ALP SERPL-CCNC: 69 U/L (ref 45–120)
ALT SERPL W P-5'-P-CCNC: 27 U/L (ref 0–45)
AST SERPL W P-5'-P-CCNC: 21 U/L (ref 0–40)
BILIRUB DIRECT SERPL-MCNC: 0.1 MG/DL
BILIRUB SERPL-MCNC: 0.4 MG/DL (ref 0–1)
PROT SERPL-MCNC: 6.5 G/DL (ref 6–8)

## 2019-01-22 ASSESSMENT — MIFFLIN-ST. JEOR: SCORE: 1119.84

## 2019-01-28 ENCOUNTER — RECORDS - HEALTHEAST (OUTPATIENT)
Dept: ADMINISTRATIVE | Facility: OTHER | Age: 69
End: 2019-01-28

## 2019-01-28 ENCOUNTER — RECORDS - HEALTHEAST (OUTPATIENT)
Dept: BONE DENSITY | Facility: CLINIC | Age: 69
End: 2019-01-28

## 2019-01-28 DIAGNOSIS — M94.9 DISORDER OF CARTILAGE, UNSPECIFIED: ICD-10-CM

## 2019-01-28 DIAGNOSIS — Z78.0 ASYMPTOMATIC MENOPAUSAL STATE: ICD-10-CM

## 2019-01-28 DIAGNOSIS — M89.9 DISORDER OF BONE, UNSPECIFIED: ICD-10-CM

## 2019-02-16 ENCOUNTER — COMMUNICATION - HEALTHEAST (OUTPATIENT)
Dept: INTERNAL MEDICINE | Facility: CLINIC | Age: 69
End: 2019-02-16

## 2019-02-16 DIAGNOSIS — I10 ESSENTIAL HYPERTENSION: ICD-10-CM

## 2019-02-16 DIAGNOSIS — E03.9 ACQUIRED HYPOTHYROIDISM: ICD-10-CM

## 2019-02-16 DIAGNOSIS — E78.00 HYPERCHOLESTEROLEMIA: ICD-10-CM

## 2019-02-22 ENCOUNTER — COMMUNICATION - HEALTHEAST (OUTPATIENT)
Dept: INTERNAL MEDICINE | Facility: CLINIC | Age: 69
End: 2019-02-22

## 2019-03-06 ENCOUNTER — COMMUNICATION - HEALTHEAST (OUTPATIENT)
Dept: INTERNAL MEDICINE | Facility: CLINIC | Age: 69
End: 2019-03-06

## 2019-03-06 DIAGNOSIS — Z12.31 ENCOUNTER FOR SCREENING MAMMOGRAM FOR BREAST CANCER: ICD-10-CM

## 2019-03-25 ENCOUNTER — HOSPITAL ENCOUNTER (OUTPATIENT)
Dept: MAMMOGRAPHY | Facility: CLINIC | Age: 69
Discharge: HOME | End: 2019-03-25
Attending: INTERNAL MEDICINE

## 2019-03-25 DIAGNOSIS — Z12.31 ENCOUNTER FOR SCREENING MAMMOGRAM FOR BREAST CANCER: ICD-10-CM

## 2019-04-23 ENCOUNTER — RECORDS - HEALTHEAST (OUTPATIENT)
Dept: ADMINISTRATIVE | Facility: OTHER | Age: 69
End: 2019-04-23

## 2019-05-01 ENCOUNTER — RECORDS - HEALTHEAST (OUTPATIENT)
Dept: ADMINISTRATIVE | Facility: OTHER | Age: 69
End: 2019-05-01

## 2019-05-08 ENCOUNTER — RECORDS - HEALTHEAST (OUTPATIENT)
Dept: ADMINISTRATIVE | Facility: OTHER | Age: 69
End: 2019-05-08

## 2019-05-12 ENCOUNTER — COMMUNICATION - HEALTHEAST (OUTPATIENT)
Dept: INTERNAL MEDICINE | Facility: CLINIC | Age: 69
End: 2019-05-12

## 2019-05-12 DIAGNOSIS — M15.0 PRIMARY OSTEOARTHRITIS INVOLVING MULTIPLE JOINTS: ICD-10-CM

## 2019-07-19 ENCOUNTER — OFFICE VISIT - HEALTHEAST (OUTPATIENT)
Dept: INTERNAL MEDICINE | Facility: CLINIC | Age: 69
End: 2019-07-19

## 2019-07-19 DIAGNOSIS — E55.9 VITAMIN D DEFICIENCY: ICD-10-CM

## 2019-07-19 DIAGNOSIS — E78.00 HYPERCHOLESTEROLEMIA: ICD-10-CM

## 2019-07-19 DIAGNOSIS — I65.29 STENOSIS OF CAROTID ARTERY, UNSPECIFIED LATERALITY: ICD-10-CM

## 2019-07-19 DIAGNOSIS — I10 ESSENTIAL HYPERTENSION: ICD-10-CM

## 2019-07-19 DIAGNOSIS — I25.83 CORONARY ATHEROSCLEROSIS DUE TO LIPID RICH PLAQUE: ICD-10-CM

## 2019-07-19 DIAGNOSIS — N95.2 ATROPHIC VAGINITIS: ICD-10-CM

## 2019-07-19 DIAGNOSIS — I77.9 CAROTID ARTERY DISEASE, UNSPECIFIED LATERALITY (H): ICD-10-CM

## 2019-07-19 ASSESSMENT — MIFFLIN-ST. JEOR: SCORE: 1118.13

## 2019-08-14 ENCOUNTER — RECORDS - HEALTHEAST (OUTPATIENT)
Dept: ADMINISTRATIVE | Facility: OTHER | Age: 69
End: 2019-08-14

## 2019-09-03 ENCOUNTER — COMMUNICATION - HEALTHEAST (OUTPATIENT)
Dept: INTERNAL MEDICINE | Facility: CLINIC | Age: 69
End: 2019-09-03

## 2019-10-02 ENCOUNTER — COMMUNICATION - HEALTHEAST (OUTPATIENT)
Dept: INTERNAL MEDICINE | Facility: CLINIC | Age: 69
End: 2019-10-02

## 2019-10-02 DIAGNOSIS — J02.9 SORE THROAT: ICD-10-CM

## 2019-10-03 ENCOUNTER — AMBULATORY - HEALTHEAST (OUTPATIENT)
Dept: NURSING | Facility: CLINIC | Age: 69
End: 2019-10-03

## 2019-10-03 DIAGNOSIS — J02.9 SORE THROAT: ICD-10-CM

## 2019-10-03 LAB — DEPRECATED S PYO AG THROAT QL EIA: NORMAL

## 2019-10-04 LAB — GROUP A STREP BY PCR: NORMAL

## 2019-11-11 ENCOUNTER — RECORDS - HEALTHEAST (OUTPATIENT)
Dept: ADMINISTRATIVE | Facility: OTHER | Age: 69
End: 2019-11-11

## 2019-11-20 ENCOUNTER — RECORDS - HEALTHEAST (OUTPATIENT)
Dept: ADMINISTRATIVE | Facility: OTHER | Age: 69
End: 2019-11-20

## 2019-11-25 ENCOUNTER — COMMUNICATION - HEALTHEAST (OUTPATIENT)
Dept: INTERNAL MEDICINE | Facility: CLINIC | Age: 69
End: 2019-11-25

## 2019-11-25 DIAGNOSIS — M15.0 PRIMARY OSTEOARTHRITIS INVOLVING MULTIPLE JOINTS: ICD-10-CM

## 2020-01-17 ENCOUNTER — COMMUNICATION - HEALTHEAST (OUTPATIENT)
Dept: INTERNAL MEDICINE | Facility: CLINIC | Age: 70
End: 2020-01-17

## 2020-01-17 DIAGNOSIS — I65.29 STENOSIS OF CAROTID ARTERY, UNSPECIFIED LATERALITY: ICD-10-CM

## 2020-01-17 DIAGNOSIS — E55.9 VITAMIN D DEFICIENCY: ICD-10-CM

## 2020-01-17 DIAGNOSIS — E03.9 ACQUIRED HYPOTHYROIDISM: ICD-10-CM

## 2020-01-17 DIAGNOSIS — I25.83 CORONARY ATHEROSCLEROSIS DUE TO LIPID RICH PLAQUE: ICD-10-CM

## 2020-01-17 DIAGNOSIS — R73.01 ELEVATED FASTING GLUCOSE: ICD-10-CM

## 2020-01-17 DIAGNOSIS — Z79.899 ENCOUNTER FOR LONG-TERM (CURRENT) USE OF MEDICATIONS: ICD-10-CM

## 2020-01-24 ENCOUNTER — COMMUNICATION - HEALTHEAST (OUTPATIENT)
Dept: INTERNAL MEDICINE | Facility: CLINIC | Age: 70
End: 2020-01-24

## 2020-01-24 DIAGNOSIS — M25.50 ARTHRALGIA, UNSPECIFIED JOINT: ICD-10-CM

## 2020-01-29 ENCOUNTER — AMBULATORY - HEALTHEAST (OUTPATIENT)
Dept: LAB | Facility: CLINIC | Age: 70
End: 2020-01-29

## 2020-01-29 ENCOUNTER — COMMUNICATION - HEALTHEAST (OUTPATIENT)
Dept: INTERNAL MEDICINE | Facility: CLINIC | Age: 70
End: 2020-01-29

## 2020-01-29 DIAGNOSIS — Z79.899 ENCOUNTER FOR LONG-TERM (CURRENT) USE OF MEDICATIONS: ICD-10-CM

## 2020-01-29 DIAGNOSIS — E55.9 VITAMIN D DEFICIENCY: ICD-10-CM

## 2020-01-29 DIAGNOSIS — E03.9 ACQUIRED HYPOTHYROIDISM: ICD-10-CM

## 2020-01-29 DIAGNOSIS — I25.83 CORONARY ATHEROSCLEROSIS DUE TO LIPID RICH PLAQUE: ICD-10-CM

## 2020-01-29 DIAGNOSIS — R73.01 ELEVATED FASTING GLUCOSE: ICD-10-CM

## 2020-01-29 LAB
ALBUMIN SERPL-MCNC: 4.1 G/DL (ref 3.5–5)
ALP SERPL-CCNC: 57 U/L (ref 45–120)
ALT SERPL W P-5'-P-CCNC: 26 U/L (ref 0–45)
ANION GAP SERPL CALCULATED.3IONS-SCNC: 10 MMOL/L (ref 5–18)
AST SERPL W P-5'-P-CCNC: 22 U/L (ref 0–40)
BILIRUB SERPL-MCNC: 0.6 MG/DL (ref 0–1)
BUN SERPL-MCNC: 15 MG/DL (ref 8–28)
CALCIUM SERPL-MCNC: 8.9 MG/DL (ref 8.5–10.5)
CHLORIDE BLD-SCNC: 104 MMOL/L (ref 98–107)
CHOLEST SERPL-MCNC: 155 MG/DL
CO2 SERPL-SCNC: 25 MMOL/L (ref 22–31)
CREAT SERPL-MCNC: 0.71 MG/DL (ref 0.6–1.1)
ERYTHROCYTE [DISTWIDTH] IN BLOOD BY AUTOMATED COUNT: 12 % (ref 11–14.5)
FASTING STATUS PATIENT QL REPORTED: YES
GFR SERPL CREATININE-BSD FRML MDRD: >60 ML/MIN/1.73M2
GLUCOSE BLD-MCNC: 106 MG/DL (ref 70–125)
HBA1C MFR BLD: 5.7 % (ref 3.5–6)
HCT VFR BLD AUTO: 40.1 % (ref 35–47)
HDLC SERPL-MCNC: 58 MG/DL
HGB BLD-MCNC: 13.3 G/DL (ref 12–16)
LDLC SERPL CALC-MCNC: 71 MG/DL
MCH RBC QN AUTO: 32 PG (ref 27–34)
MCHC RBC AUTO-ENTMCNC: 33.2 G/DL (ref 32–36)
MCV RBC AUTO: 96 FL (ref 80–100)
PLATELET # BLD AUTO: 169 THOU/UL (ref 140–440)
PMV BLD AUTO: 11.4 FL (ref 8.5–12.5)
POTASSIUM BLD-SCNC: 4.3 MMOL/L (ref 3.5–5)
PROT SERPL-MCNC: 6.6 G/DL (ref 6–8)
RBC # BLD AUTO: 4.16 MILL/UL (ref 3.8–5.4)
SODIUM SERPL-SCNC: 139 MMOL/L (ref 136–145)
TRIGL SERPL-MCNC: 131 MG/DL
TSH SERPL DL<=0.005 MIU/L-ACNC: 0.1 UIU/ML (ref 0.3–5)
WBC: 4.8 THOU/UL (ref 4–11)

## 2020-01-30 LAB
25(OH)D3 SERPL-MCNC: 55.2 NG/ML (ref 30–80)
25(OH)D3 SERPL-MCNC: 55.2 NG/ML (ref 30–80)

## 2020-02-05 ENCOUNTER — OFFICE VISIT - HEALTHEAST (OUTPATIENT)
Dept: INTERNAL MEDICINE | Facility: CLINIC | Age: 70
End: 2020-02-05

## 2020-02-05 DIAGNOSIS — N39.498 OTHER URINARY INCONTINENCE: ICD-10-CM

## 2020-02-05 DIAGNOSIS — Z29.9 ENCOUNTER FOR PREVENTIVE MEASURE: ICD-10-CM

## 2020-02-05 DIAGNOSIS — E78.00 HYPERCHOLESTEROLEMIA: ICD-10-CM

## 2020-02-05 DIAGNOSIS — I10 ESSENTIAL HYPERTENSION: ICD-10-CM

## 2020-02-05 DIAGNOSIS — H93.13 TINNITUS, BILATERAL: ICD-10-CM

## 2020-02-05 DIAGNOSIS — N95.2 ATROPHIC VAGINITIS: ICD-10-CM

## 2020-02-05 DIAGNOSIS — I65.29 STENOSIS OF CAROTID ARTERY, UNSPECIFIED LATERALITY: ICD-10-CM

## 2020-02-05 DIAGNOSIS — E03.9 ACQUIRED HYPOTHYROIDISM: ICD-10-CM

## 2020-02-05 LAB
ALBUMIN UR-MCNC: NEGATIVE MG/DL
APPEARANCE UR: CLEAR
BILIRUB UR QL STRIP: NEGATIVE
COLOR UR AUTO: YELLOW
GLUCOSE UR STRIP-MCNC: NEGATIVE MG/DL
HGB UR QL STRIP: NEGATIVE
KETONES UR STRIP-MCNC: NEGATIVE MG/DL
LEUKOCYTE ESTERASE UR QL STRIP: NEGATIVE
NITRATE UR QL: NEGATIVE
PH UR STRIP: 6 [PH] (ref 5–8)
SP GR UR STRIP: 1.01 (ref 1–1.03)
UROBILINOGEN UR STRIP-ACNC: NORMAL

## 2020-02-05 ASSESSMENT — MIFFLIN-ST. JEOR: SCORE: 1145.92

## 2020-02-05 ASSESSMENT — ANXIETY QUESTIONNAIRES
1. FEELING NERVOUS, ANXIOUS, OR ON EDGE: NOT AT ALL
2. NOT BEING ABLE TO STOP OR CONTROL WORRYING: NOT AT ALL

## 2020-02-07 ENCOUNTER — COMMUNICATION - HEALTHEAST (OUTPATIENT)
Dept: INTERNAL MEDICINE | Facility: CLINIC | Age: 70
End: 2020-02-07

## 2020-02-07 DIAGNOSIS — M15.0 PRIMARY OSTEOARTHRITIS INVOLVING MULTIPLE JOINTS: ICD-10-CM

## 2020-02-20 ENCOUNTER — COMMUNICATION - HEALTHEAST (OUTPATIENT)
Dept: INTERNAL MEDICINE | Facility: CLINIC | Age: 70
End: 2020-02-20

## 2020-02-20 DIAGNOSIS — E03.9 ACQUIRED HYPOTHYROIDISM: ICD-10-CM

## 2020-02-20 DIAGNOSIS — I10 ESSENTIAL HYPERTENSION: ICD-10-CM

## 2020-03-06 ENCOUNTER — COMMUNICATION - HEALTHEAST (OUTPATIENT)
Dept: INTERNAL MEDICINE | Facility: CLINIC | Age: 70
End: 2020-03-06

## 2020-03-06 DIAGNOSIS — E03.9 ACQUIRED HYPOTHYROIDISM: ICD-10-CM

## 2020-03-06 DIAGNOSIS — E78.00 HYPERCHOLESTEROLEMIA: ICD-10-CM

## 2020-03-26 ENCOUNTER — COMMUNICATION - HEALTHEAST (OUTPATIENT)
Dept: INTERNAL MEDICINE | Facility: CLINIC | Age: 70
End: 2020-03-26

## 2020-03-26 DIAGNOSIS — M15.0 PRIMARY OSTEOARTHRITIS INVOLVING MULTIPLE JOINTS: ICD-10-CM

## 2020-03-27 ENCOUNTER — COMMUNICATION - HEALTHEAST (OUTPATIENT)
Dept: INTERNAL MEDICINE | Facility: CLINIC | Age: 70
End: 2020-03-27

## 2020-04-29 ENCOUNTER — COMMUNICATION - HEALTHEAST (OUTPATIENT)
Dept: INTERNAL MEDICINE | Facility: CLINIC | Age: 70
End: 2020-04-29

## 2020-04-30 ENCOUNTER — COMMUNICATION - HEALTHEAST (OUTPATIENT)
Dept: INTERNAL MEDICINE | Facility: CLINIC | Age: 70
End: 2020-04-30

## 2020-05-04 ENCOUNTER — COMMUNICATION - HEALTHEAST (OUTPATIENT)
Dept: INTERNAL MEDICINE | Facility: CLINIC | Age: 70
End: 2020-05-04

## 2020-05-04 DIAGNOSIS — M15.0 PRIMARY OSTEOARTHRITIS INVOLVING MULTIPLE JOINTS: ICD-10-CM

## 2020-05-12 ENCOUNTER — RECORDS - HEALTHEAST (OUTPATIENT)
Dept: ADMINISTRATIVE | Facility: OTHER | Age: 70
End: 2020-05-12

## 2020-05-18 ENCOUNTER — RECORDS - HEALTHEAST (OUTPATIENT)
Dept: ADMINISTRATIVE | Facility: OTHER | Age: 70
End: 2020-05-18

## 2020-05-20 ENCOUNTER — RECORDS - HEALTHEAST (OUTPATIENT)
Dept: ADMINISTRATIVE | Facility: OTHER | Age: 70
End: 2020-05-20

## 2020-05-26 ENCOUNTER — RECORDS - HEALTHEAST (OUTPATIENT)
Dept: ADMINISTRATIVE | Facility: OTHER | Age: 70
End: 2020-05-26

## 2020-07-01 ENCOUNTER — COMMUNICATION - HEALTHEAST (OUTPATIENT)
Dept: INTERNAL MEDICINE | Facility: CLINIC | Age: 70
End: 2020-07-01

## 2020-07-10 ENCOUNTER — COMMUNICATION - HEALTHEAST (OUTPATIENT)
Dept: INTERNAL MEDICINE | Facility: CLINIC | Age: 70
End: 2020-07-10

## 2020-07-10 DIAGNOSIS — M25.50 ARTHRALGIA, UNSPECIFIED JOINT: ICD-10-CM

## 2020-07-24 ENCOUNTER — HOSPITAL ENCOUNTER (OUTPATIENT)
Dept: MAMMOGRAPHY | Facility: CLINIC | Age: 70
Discharge: HOME | End: 2020-07-24
Attending: INTERNAL MEDICINE

## 2020-07-24 ENCOUNTER — COMMUNICATION - HEALTHEAST (OUTPATIENT)
Dept: LAB | Facility: CLINIC | Age: 70
End: 2020-07-24

## 2020-07-24 DIAGNOSIS — Z12.31 VISIT FOR SCREENING MAMMOGRAM: ICD-10-CM

## 2020-07-27 ENCOUNTER — AMBULATORY - HEALTHEAST (OUTPATIENT)
Dept: INTERNAL MEDICINE | Facility: CLINIC | Age: 70
End: 2020-07-27

## 2020-08-07 ENCOUNTER — AMBULATORY - HEALTHEAST (OUTPATIENT)
Dept: LAB | Facility: CLINIC | Age: 70
End: 2020-08-07

## 2020-08-07 DIAGNOSIS — E03.9 ACQUIRED HYPOTHYROIDISM: ICD-10-CM

## 2020-08-07 LAB — TSH SERPL DL<=0.005 MIU/L-ACNC: 0.63 UIU/ML (ref 0.3–5)

## 2020-08-11 ENCOUNTER — COMMUNICATION - HEALTHEAST (OUTPATIENT)
Dept: INTERNAL MEDICINE | Facility: CLINIC | Age: 70
End: 2020-08-11

## 2020-08-11 DIAGNOSIS — M25.50 ARTHRALGIA, UNSPECIFIED JOINT: ICD-10-CM

## 2020-08-11 RX ORDER — GABAPENTIN 300 MG/1
CAPSULE ORAL
Qty: 270 CAPSULE | Refills: 1 | Status: SHIPPED | OUTPATIENT
Start: 2020-08-11 | End: 2021-12-01

## 2020-08-13 ENCOUNTER — COMMUNICATION - HEALTHEAST (OUTPATIENT)
Dept: INTERNAL MEDICINE | Facility: CLINIC | Age: 70
End: 2020-08-13

## 2020-08-13 DIAGNOSIS — M15.0 PRIMARY OSTEOARTHRITIS INVOLVING MULTIPLE JOINTS: ICD-10-CM

## 2020-08-13 DIAGNOSIS — N95.2 ATROPHIC VAGINITIS: ICD-10-CM

## 2020-08-14 ENCOUNTER — OFFICE VISIT - HEALTHEAST (OUTPATIENT)
Dept: INTERNAL MEDICINE | Facility: CLINIC | Age: 70
End: 2020-08-14

## 2020-08-14 DIAGNOSIS — I10 ESSENTIAL HYPERTENSION: ICD-10-CM

## 2020-08-14 DIAGNOSIS — E03.9 ACQUIRED HYPOTHYROIDISM: ICD-10-CM

## 2020-08-14 DIAGNOSIS — I25.83 CORONARY ATHEROSCLEROSIS DUE TO LIPID RICH PLAQUE: ICD-10-CM

## 2020-08-14 DIAGNOSIS — I65.29 STENOSIS OF CAROTID ARTERY, UNSPECIFIED LATERALITY: ICD-10-CM

## 2020-08-14 DIAGNOSIS — M25.50 POLYARTHRALGIA: ICD-10-CM

## 2020-08-14 LAB
ANION GAP SERPL CALCULATED.3IONS-SCNC: 7 MMOL/L (ref 5–18)
BUN SERPL-MCNC: 14 MG/DL (ref 8–28)
CALCIUM SERPL-MCNC: 9 MG/DL (ref 8.5–10.5)
CHLORIDE BLD-SCNC: 104 MMOL/L (ref 98–107)
CO2 SERPL-SCNC: 26 MMOL/L (ref 22–31)
CREAT SERPL-MCNC: 0.69 MG/DL (ref 0.6–1.1)
ERYTHROCYTE [DISTWIDTH] IN BLOOD BY AUTOMATED COUNT: 11.2 % (ref 11–14.5)
GFR SERPL CREATININE-BSD FRML MDRD: >60 ML/MIN/1.73M2
GLUCOSE BLD-MCNC: 113 MG/DL (ref 70–125)
HCT VFR BLD AUTO: 42.3 % (ref 35–47)
HGB BLD-MCNC: 14.3 G/DL (ref 12–16)
MCH RBC QN AUTO: 32.5 PG (ref 27–34)
MCHC RBC AUTO-ENTMCNC: 33.7 G/DL (ref 32–36)
MCV RBC AUTO: 97 FL (ref 80–100)
PLATELET # BLD AUTO: 202 THOU/UL (ref 140–440)
PMV BLD AUTO: 8.2 FL (ref 7–10)
POTASSIUM BLD-SCNC: 4.4 MMOL/L (ref 3.5–5)
RBC # BLD AUTO: 4.39 MILL/UL (ref 3.8–5.4)
SODIUM SERPL-SCNC: 137 MMOL/L (ref 136–145)
WBC: 3.7 THOU/UL (ref 4–11)

## 2020-08-14 RX ORDER — CELECOXIB 100 MG/1
100 CAPSULE ORAL 2 TIMES DAILY
Qty: 180 CAPSULE | Refills: 2 | Status: SHIPPED | OUTPATIENT
Start: 2020-08-14 | End: 2022-02-11

## 2020-08-14 ASSESSMENT — MIFFLIN-ST. JEOR: SCORE: 1145.92

## 2020-09-09 ENCOUNTER — COMMUNICATION - HEALTHEAST (OUTPATIENT)
Dept: INTERNAL MEDICINE | Facility: CLINIC | Age: 70
End: 2020-09-09

## 2020-09-09 DIAGNOSIS — M15.0 PRIMARY OSTEOARTHRITIS INVOLVING MULTIPLE JOINTS: ICD-10-CM

## 2020-10-22 ENCOUNTER — COMMUNICATION - HEALTHEAST (OUTPATIENT)
Dept: INTERNAL MEDICINE | Facility: CLINIC | Age: 70
End: 2020-10-22

## 2020-10-22 DIAGNOSIS — I10 ESSENTIAL HYPERTENSION: ICD-10-CM

## 2020-10-22 DIAGNOSIS — M15.0 PRIMARY OSTEOARTHRITIS INVOLVING MULTIPLE JOINTS: ICD-10-CM

## 2020-11-12 ENCOUNTER — RECORDS - HEALTHEAST (OUTPATIENT)
Dept: ADMINISTRATIVE | Facility: OTHER | Age: 70
End: 2020-11-12

## 2020-11-25 ENCOUNTER — COMMUNICATION - HEALTHEAST (OUTPATIENT)
Dept: INTERNAL MEDICINE | Facility: CLINIC | Age: 70
End: 2020-11-25

## 2020-11-25 DIAGNOSIS — M15.0 PRIMARY OSTEOARTHRITIS INVOLVING MULTIPLE JOINTS: ICD-10-CM

## 2020-11-30 ENCOUNTER — COMMUNICATION - HEALTHEAST (OUTPATIENT)
Dept: INTERNAL MEDICINE | Facility: CLINIC | Age: 70
End: 2020-11-30

## 2020-11-30 DIAGNOSIS — E78.00 HYPERCHOLESTEROLEMIA: ICD-10-CM

## 2020-11-30 DIAGNOSIS — E03.9 ACQUIRED HYPOTHYROIDISM: ICD-10-CM

## 2020-11-30 DIAGNOSIS — E55.9 VITAMIN D DEFICIENCY: ICD-10-CM

## 2020-11-30 DIAGNOSIS — I10 ESSENTIAL HYPERTENSION: ICD-10-CM

## 2021-01-22 ENCOUNTER — COMMUNICATION - HEALTHEAST (OUTPATIENT)
Dept: INTERNAL MEDICINE | Facility: CLINIC | Age: 71
End: 2021-01-22

## 2021-01-22 DIAGNOSIS — I10 ESSENTIAL HYPERTENSION: ICD-10-CM

## 2021-01-22 RX ORDER — LEVOTHYROXINE SODIUM 100 UG/1
TABLET ORAL
Qty: 90 TABLET | Refills: 1 | Status: SHIPPED | OUTPATIENT
Start: 2021-01-22 | End: 2022-02-08

## 2021-02-22 ENCOUNTER — AMBULATORY - HEALTHEAST (OUTPATIENT)
Dept: LAB | Facility: CLINIC | Age: 71
End: 2021-02-22

## 2021-02-22 DIAGNOSIS — E78.00 HYPERCHOLESTEROLEMIA: ICD-10-CM

## 2021-02-22 DIAGNOSIS — E55.9 VITAMIN D DEFICIENCY: ICD-10-CM

## 2021-02-22 DIAGNOSIS — I10 ESSENTIAL HYPERTENSION: ICD-10-CM

## 2021-02-22 DIAGNOSIS — E03.9 ACQUIRED HYPOTHYROIDISM: ICD-10-CM

## 2021-02-22 LAB
ANION GAP SERPL CALCULATED.3IONS-SCNC: 8 MMOL/L (ref 5–18)
BUN SERPL-MCNC: 11 MG/DL (ref 8–28)
CALCIUM SERPL-MCNC: 8.4 MG/DL (ref 8.5–10.5)
CHLORIDE BLD-SCNC: 107 MMOL/L (ref 98–107)
CHOLEST SERPL-MCNC: 147 MG/DL
CO2 SERPL-SCNC: 24 MMOL/L (ref 22–31)
CREAT SERPL-MCNC: 0.69 MG/DL (ref 0.6–1.1)
ERYTHROCYTE [DISTWIDTH] IN BLOOD BY AUTOMATED COUNT: 12.2 % (ref 11–14.5)
FASTING STATUS PATIENT QL REPORTED: YES
GFR SERPL CREATININE-BSD FRML MDRD: >60 ML/MIN/1.73M2
GLUCOSE BLD-MCNC: 109 MG/DL (ref 70–125)
HCT VFR BLD AUTO: 41.7 % (ref 35–47)
HDLC SERPL-MCNC: 58 MG/DL
HGB BLD-MCNC: 13.8 G/DL (ref 12–16)
LDLC SERPL CALC-MCNC: 62 MG/DL
MCH RBC QN AUTO: 32 PG (ref 27–34)
MCHC RBC AUTO-ENTMCNC: 33.1 G/DL (ref 32–36)
MCV RBC AUTO: 97 FL (ref 80–100)
PLATELET # BLD AUTO: 173 THOU/UL (ref 140–440)
PMV BLD AUTO: 10.2 FL (ref 7–10)
POTASSIUM BLD-SCNC: 4.6 MMOL/L (ref 3.5–5)
RBC # BLD AUTO: 4.31 MILL/UL (ref 3.8–5.4)
SODIUM SERPL-SCNC: 139 MMOL/L (ref 136–145)
TRIGL SERPL-MCNC: 134 MG/DL
TSH SERPL DL<=0.005 MIU/L-ACNC: 0.41 UIU/ML (ref 0.3–5)
WBC: 4.2 THOU/UL (ref 4–11)

## 2021-02-23 ENCOUNTER — COMMUNICATION - HEALTHEAST (OUTPATIENT)
Dept: INTERNAL MEDICINE | Facility: CLINIC | Age: 71
End: 2021-02-23

## 2021-02-23 DIAGNOSIS — E03.9 ACQUIRED HYPOTHYROIDISM: ICD-10-CM

## 2021-02-23 LAB
25(OH)D3 SERPL-MCNC: 54.2 NG/ML (ref 30–80)
25(OH)D3 SERPL-MCNC: 54.2 NG/ML (ref 30–80)

## 2021-02-23 RX ORDER — METOPROLOL SUCCINATE 50 MG/1
TABLET, EXTENDED RELEASE ORAL
Qty: 90 TABLET | Refills: 1 | Status: SHIPPED | OUTPATIENT
Start: 2021-02-23 | End: 2021-08-25

## 2021-02-23 RX ORDER — LISINOPRIL 10 MG/1
TABLET ORAL
Qty: 90 TABLET | Refills: 1 | Status: SHIPPED | OUTPATIENT
Start: 2021-02-23 | End: 2021-08-25

## 2021-02-24 ENCOUNTER — COMMUNICATION - HEALTHEAST (OUTPATIENT)
Dept: INTERNAL MEDICINE | Facility: CLINIC | Age: 71
End: 2021-02-24

## 2021-02-24 ENCOUNTER — RECORDS - HEALTHEAST (OUTPATIENT)
Dept: INTERNAL MEDICINE | Facility: CLINIC | Age: 71
End: 2021-02-24

## 2021-02-24 DIAGNOSIS — E78.00 HYPERCHOLESTEROLEMIA: ICD-10-CM

## 2021-02-24 DIAGNOSIS — M15.0 PRIMARY OSTEOARTHRITIS INVOLVING MULTIPLE JOINTS: ICD-10-CM

## 2021-02-24 DIAGNOSIS — N95.2 ATROPHIC VAGINITIS: ICD-10-CM

## 2021-02-24 RX ORDER — ROSUVASTATIN CALCIUM 40 MG/1
TABLET, COATED ORAL
Qty: 90 TABLET | Refills: 1 | Status: SHIPPED | OUTPATIENT
Start: 2021-02-24 | End: 2021-09-08

## 2021-02-26 ENCOUNTER — OFFICE VISIT - HEALTHEAST (OUTPATIENT)
Dept: INTERNAL MEDICINE | Facility: CLINIC | Age: 71
End: 2021-02-26

## 2021-02-26 DIAGNOSIS — E55.9 VITAMIN D DEFICIENCY: ICD-10-CM

## 2021-02-26 DIAGNOSIS — Z00.00 ENCOUNTER FOR PREVENTIVE CARE: ICD-10-CM

## 2021-02-26 DIAGNOSIS — I10 ESSENTIAL HYPERTENSION: ICD-10-CM

## 2021-02-26 DIAGNOSIS — E78.00 HYPERCHOLESTEROLEMIA: ICD-10-CM

## 2021-02-26 DIAGNOSIS — K21.00 GASTROESOPHAGEAL REFLUX DISEASE WITH ESOPHAGITIS WITHOUT HEMORRHAGE: ICD-10-CM

## 2021-02-26 DIAGNOSIS — I25.83 CORONARY ATHEROSCLEROSIS DUE TO LIPID RICH PLAQUE: ICD-10-CM

## 2021-02-26 DIAGNOSIS — Z78.0 MENOPAUSE: ICD-10-CM

## 2021-02-26 DIAGNOSIS — I34.0 NONRHEUMATIC MITRAL VALVE REGURGITATION: ICD-10-CM

## 2021-02-26 DIAGNOSIS — E03.9 ACQUIRED HYPOTHYROIDISM: ICD-10-CM

## 2021-02-26 DIAGNOSIS — M15.0 PRIMARY OSTEOARTHRITIS INVOLVING MULTIPLE JOINTS: ICD-10-CM

## 2021-02-26 DIAGNOSIS — R06.09 DYSPNEA ON EXERTION: ICD-10-CM

## 2021-02-26 DIAGNOSIS — I65.29 STENOSIS OF CAROTID ARTERY, UNSPECIFIED LATERALITY: ICD-10-CM

## 2021-02-26 RX ORDER — TRAMADOL HYDROCHLORIDE 50 MG/1
50 TABLET ORAL EVERY 6 HOURS PRN
Qty: 20 TABLET | Refills: 1 | Status: SHIPPED | OUTPATIENT
Start: 2021-02-26 | End: 2021-12-01

## 2021-02-26 RX ORDER — FAMOTIDINE 20 MG/1
20 TABLET, FILM COATED ORAL 2 TIMES DAILY PRN
Status: SHIPPED | COMMUNITY
Start: 2021-02-26

## 2021-02-26 ASSESSMENT — MIFFLIN-ST. JEOR: SCORE: 1157.54

## 2021-03-23 ENCOUNTER — HOSPITAL ENCOUNTER (OUTPATIENT)
Dept: CARDIOLOGY | Facility: HOSPITAL | Age: 71
Discharge: HOME | End: 2021-03-23
Attending: INTERNAL MEDICINE

## 2021-03-23 DIAGNOSIS — I34.0 NONRHEUMATIC MITRAL VALVE REGURGITATION: ICD-10-CM

## 2021-03-23 DIAGNOSIS — R06.09 DYSPNEA ON EXERTION: ICD-10-CM

## 2021-03-23 LAB
AORTIC ROOT: 2.5 CM
BSA FOR ECHO PROCEDURE: 1.74 M2
CV ECHO HEIGHT: 61 IN
CV ECHO WEIGHT: 156 LBS
DOP CALC MV VTI: 27 CM
DOP CALCLVOT PEAK VEL VTI: 16.4 CM
EJECTION FRACTION: 68 % (ref 55–75)
FRACTIONAL SHORTENING: 36.4 % (ref 28–44)
INTERVENTRICULAR SEPTUM IN END DIASTOLE: 0.9 CM (ref 0.6–0.9)
IVS/PW RATIO: 1.1
LA AREA 1: 13.5 CM2
LA AREA 2: 11.4 CM2
LEFT ATRIUM LENGTH: 4.27 CM
LEFT ATRIUM SIZE: 3.4 CM
LEFT ATRIUM TO AORTIC ROOT RATIO: 1.36 NO UNITS
LEFT ATRIUM VOLUME INDEX: 17.6 ML/M2
LEFT ATRIUM VOLUME: 30.6 ML
LEFT VENTRICLE DIASTOLIC VOLUME INDEX: 33.9 CM3/M2 (ref 29–61)
LEFT VENTRICLE DIASTOLIC VOLUME: 59 CM3 (ref 46–106)
LEFT VENTRICLE HEART RATE: 69 BPM
LEFT VENTRICLE MASS INDEX: 68.1 G/M2
LEFT VENTRICLE SYSTOLIC VOLUME INDEX: 10.9 CM3/M2 (ref 8–24)
LEFT VENTRICLE SYSTOLIC VOLUME: 19 CM3 (ref 14–42)
LEFT VENTRICULAR INTERNAL DIMENSION IN DIASTOLE: 4.4 CM (ref 3.8–5.2)
LEFT VENTRICULAR INTERNAL DIMENSION IN SYSTOLE: 2.8 CM (ref 2.2–3.5)
LEFT VENTRICULAR MASS: 118.6 G
LEFT VENTRICULAR OUTFLOW TRACT MEAN GRADIENT: 1 MMHG
LEFT VENTRICULAR OUTFLOW TRACT MEAN VELOCITY: 45.7 CM/S
LEFT VENTRICULAR POSTERIOR WALL IN END DIASTOLE: 0.8 CM (ref 0.6–0.9)
MITRAL VALVE E/A RATIO: 0.7
MITRAL VALVE MEAN INFLOW VELOCITY: 66.2 CM/S
MITRAL VALVE PEAK VELOCITY: 113 CM/S
MV AVERAGE E/E' RATIO: 9.9 CM/S
MV DECELERATION TIME: 208 MS
MV E'TISSUE VEL-LAT: 7.9 CM/S
MV E'TISSUE VEL-MED: 5.26 CM/S
MV LATERAL E/E' RATIO: 8.3
MV MEAN GRADIENT: 2 MMHG
MV MEDIAL E/E' RATIO: 12.4
MV PEAK A VELOCITY: 99.7 CM/S
MV PEAK E VELOCITY: 65.2 CM/S
MV PEAK GRADIENT: 5.1 MMHG
NUC REST DIASTOLIC VOLUME INDEX: 2496 LBS
NUC REST SYSTOLIC VOLUME INDEX: 61 IN
TRICUSPID REGURGITATION PEAK PRESSURE GRADIENT: 25.8 MMHG
TRICUSPID VALVE ANULAR PLANE SYSTOLIC EXCURSION: 1.8 CM
TRICUSPID VALVE PEAK REGURGITANT VELOCITY: 254 CM/S

## 2021-03-23 ASSESSMENT — MIFFLIN-ST. JEOR: SCORE: 1154.99

## 2021-03-25 ENCOUNTER — RECORDS - HEALTHEAST (OUTPATIENT)
Dept: BONE DENSITY | Facility: CLINIC | Age: 71
End: 2021-03-25

## 2021-03-25 ENCOUNTER — RECORDS - HEALTHEAST (OUTPATIENT)
Dept: ADMINISTRATIVE | Facility: OTHER | Age: 71
End: 2021-03-25

## 2021-03-25 DIAGNOSIS — Z78.0 ASYMPTOMATIC MENOPAUSAL STATE: ICD-10-CM

## 2021-04-14 ENCOUNTER — RECORDS - HEALTHEAST (OUTPATIENT)
Dept: ADMINISTRATIVE | Facility: OTHER | Age: 71
End: 2021-04-14
Payer: COMMERCIAL

## 2021-04-20 ENCOUNTER — RECORDS - HEALTHEAST (OUTPATIENT)
Dept: ADMINISTRATIVE | Facility: OTHER | Age: 71
End: 2021-04-20

## 2021-05-06 ENCOUNTER — COMMUNICATION - HEALTHEAST (OUTPATIENT)
Dept: INTERNAL MEDICINE | Facility: CLINIC | Age: 71
End: 2021-05-06

## 2021-05-06 DIAGNOSIS — N95.2 ATROPHIC VAGINITIS: ICD-10-CM

## 2021-05-07 RX ORDER — ESTRADIOL 0.1 MG/G
CREAM VAGINAL
Qty: 42.5 G | Refills: 0 | Status: SHIPPED | OUTPATIENT
Start: 2021-05-07 | End: 2022-02-11

## 2021-05-13 ENCOUNTER — RECORDS - HEALTHEAST (OUTPATIENT)
Dept: ADMINISTRATIVE | Facility: OTHER | Age: 71
End: 2021-05-13

## 2021-05-24 ENCOUNTER — RECORDS - HEALTHEAST (OUTPATIENT)
Dept: ADMINISTRATIVE | Facility: CLINIC | Age: 71
End: 2021-05-24

## 2021-05-25 ENCOUNTER — RECORDS - HEALTHEAST (OUTPATIENT)
Dept: ADMINISTRATIVE | Facility: CLINIC | Age: 71
End: 2021-05-25

## 2021-05-26 ENCOUNTER — RECORDS - HEALTHEAST (OUTPATIENT)
Dept: ADMINISTRATIVE | Facility: CLINIC | Age: 71
End: 2021-05-26

## 2021-05-27 ENCOUNTER — RECORDS - HEALTHEAST (OUTPATIENT)
Dept: ADMINISTRATIVE | Facility: CLINIC | Age: 71
End: 2021-05-27

## 2021-05-28 ENCOUNTER — RECORDS - HEALTHEAST (OUTPATIENT)
Dept: ADMINISTRATIVE | Facility: CLINIC | Age: 71
End: 2021-05-28

## 2021-05-28 NOTE — TELEPHONE ENCOUNTER
Controlled Substance Refill Request  Medication:   Requested Prescriptions     Pending Prescriptions Disp Refills     traMADol (ULTRAM) 50 mg tablet [Pharmacy Med Name: TRAMADOL HCL 50 MG TABLET] 90 tablet 0     Sig: TAKE 1 TABLET BY MOUTH EVERY 6 HOURS AS NEEDED FOR PAIN     Date Last Fill: 1/3/19  [id number]   Submit electronically to pharmacy  Controlled Substance Agreement on File:   Encounter-Level CSA Scan Date:    There are no encounter-level csa scan date.       Last office visit: Last office visit pertaining to requested medication was 1/22/19.

## 2021-05-29 ENCOUNTER — RECORDS - HEALTHEAST (OUTPATIENT)
Dept: ADMINISTRATIVE | Facility: CLINIC | Age: 71
End: 2021-05-29

## 2021-05-30 ENCOUNTER — RECORDS - HEALTHEAST (OUTPATIENT)
Dept: ADMINISTRATIVE | Facility: CLINIC | Age: 71
End: 2021-05-30

## 2021-05-30 VITALS — WEIGHT: 157 LBS | BODY MASS INDEX: 29.64 KG/M2 | HEIGHT: 61 IN

## 2021-05-30 VITALS — BODY MASS INDEX: 29 KG/M2 | WEIGHT: 152.2 LBS

## 2021-05-30 NOTE — PROGRESS NOTES
Formerly [facility], [facility]–1983 Clinic Follow Up Note    Assessment/Plan:  1. Essential hypertension  Controlled on current medications-with up-to-date renal panel and general chemistries    2. Coronary atherosclerosis due to lipid rich plaque  No evidence of angina or active coronary artery disease.  PVCs improved    3. Hypercholesterolemia  Labs at goal    4. Stenosis of carotid artery, unspecified laterality  Allowing with Dr. Dueñas yearly    5. Vitamin D deficiency  Patient taking vitamin D3.    6. Atrophic vaginitis  New symptoms of atrophic vaginitis.  No personal or family history of breast cancer.  Recommendation: Estrace vaginal cream  - estradiol (ESTRACE) 0.01 % (0.1 mg/gram) vaginal cream; Insert 1 g into the vagina daily. One gram every mon, wed, friday  Dispense: 42.5 g; Refill: 3        Kayley Johnson MD    Chief Complaint:  Chief Complaint   Patient presents with     Follow-up     meds and new CSA       History of Present Illness:  Xiomy is a 68 y.o. female who is here today for follow-up of her usual medical problems.  Of note, she cites no major abnormalities.  Today, we have updated her family history.  Her son has psoriasis and rheumatoid arthritis.  He is wondering if there is an autoimmune connection with her thyroid disease and pseudogout.  We discussed the possibilities.    Hue states that overall, she is feeling quite well.  Her back pain and pseudogout is under good control.  She uses 1 gabapentin at bedtime and sporadic tramadol.  She states she currently is going through a nice spell where she is not needing any pain medications.  She continues to be very active, golfing every day.  She states she is always on the move.    Her only concern is that with regard to atrophic vaginitis.  She has significant vaginal dryness and pain with intercourse.  She would like to be sexually active.  She has used Estrace in the past.  She denies any personal or family history of breast cancer.    Health  maintenance is reviewed.  She is up-to-date on her shingles vaccine and other important immunizations    Review of Systems:  A comprehensive review of systems was performed and was otherwise negative    PFSH:  Social History:  with adult children  Social History     Tobacco Use   Smoking Status Former Smoker     Packs/day: 0.50     Years: 20.00     Pack years: 10.00     Last attempt to quit: 1985     Years since quittin.5   Smokeless Tobacco Never Used       Past History:   Past Medical History:   Diagnosis Date     Arthralgia      Breast cyst y-10     Carotid artery stenosis      Cholelithiasis      Chondrocalcinosis      Coronary artery disease      Hyperlipidemia      Hypertension      Hypothyroidism      Myocardial infarction (H)      Nocturia      Osteoarthrosis      Osteopenia      Palpitations 10/5/2017     Vitamin D deficiency        Current Outpatient Medications   Medication Sig Dispense Refill     acetaminophen (TYLENOL) 500 MG tablet Take 1,000 mg by mouth every 6 (six) hours as needed.       ASPIRIN (ASPIR-81 ORAL) Take 1 tablet by mouth daily.       celecoxib (CELEBREX) 200 MG capsule Take 1 capsule (200 mg total) by mouth daily. 90 capsule 3     gabapentin (NEURONTIN) 300 MG capsule Take 1 capsule (300 mg total) by mouth 3 (three) times a day. 90 capsule 3     levothyroxine (SYNTHROID, LEVOTHROID) 112 MCG tablet TAKE 1 TABLET EVERY MORNING AT 6 A.M. 90 tablet 3     lisinopril (PRINIVIL,ZESTRIL) 10 MG tablet TAKE 1 TABLET DAILY 90 tablet 3     metoprolol succinate (TOPROL-XL) 50 MG 24 hr tablet TAKE ONE TABLET BY MOUTH ONE TIME DAILY 90 tablet 3     omeprazole (PRILOSEC) 20 MG capsule Take 1 capsule (20 mg total) by mouth daily. Prn 90 capsule 3     rosuvastatin (CRESTOR) 40 MG tablet TAKE ONE TABLET BY MOUTH ONE TIME DAILY 90 tablet 3     traMADol (ULTRAM) 50 mg tablet TAKE 1 TABLET BY MOUTH EVERY 6 HOURS AS NEEDED FOR PAIN 90 tablet 1     estradiol (ESTRACE) 0.01 % (0.1 mg/gram)  "vaginal cream Insert 1 g into the vagina daily. One gram every mon, wed, friday 42.5 g 3     No current facility-administered medications for this visit.        Family History:   Family History   Problem Relation Age of Onset     Diabetes Mother      Parkinsonism Father      Diabetes Sister      Heart disease Sister      Heart disease Brother      Arthritis Son      Psoriasis Son      Rheum arthritis Son          Physical Exam:  General Appearance:   Pleasant and well-appearing and in no acute distress  Vitals:    07/19/19 0705 07/19/19 0708   BP: 146/72 132/74   Patient Site: Left Arm    Patient Position: Sitting    Cuff Size: Adult Regular    Pulse: 68    SpO2: 98%    Weight: 150 lb 8 oz (68.3 kg)    Height: 5' 0.25\" (1.53 m)      Wt Readings from Last 3 Encounters:   07/19/19 150 lb 8 oz (68.3 kg)   01/22/19 150 lb (68 kg)   10/29/18 148 lb (67.1 kg)     Body mass index is 29.15 kg/m .    EYES: Eyelids, conjunctiva, and sclera were normal. Pupils were normal. Cornea, iris, and lens were normal bilaterally.  HEAD, EARS, NOSE, MOUTH, AND THROAT: Head and face were normal. Hearing was normal to voice and the ears were normal to external exam. Nose appearance was normal and there was no discharge. Oropharynx was normal.  TMs were normal.  NECK: Neck appearance was normal. There were no neck masses and the thyroid was not enlarged.  RESPIRATORY: Breathing pattern was normal and the chest moved symmetrically.   Lung sounds were equal bilaterally.  CARDIOVASCULAR: Heart rate and rhythm were normal.  S1 and S2 were normal and there were no extra sounds or murmurs. Peripheral pulses in arms and legs were normal.  Jugular venous pressure was normal.  There was no peripheral edema.  GASTROINTESTINAL: The abdomen was normal in contour.  Bowel sounds were present.   Palpation detected no tenderness, mass, or enlarged organs.   MUSCULOSKELETAL: Skeletal configuration was normal and muscle mass was normal for age. Joint " appearance was overall normal.  LYMPHATIC: There were no enlarged nodes.  SKIN/HAIR/NAILS: Skin color was normal.  There were no abnormal skin lesions.  Hair and nails were normal.  NEUROLOGIC: The patient was alert and oriented to person, place, time, and circumstance. Speech was normal. Cranial nerves were normal. Motor strength was normal for age. The patient was normally coordinated.  PSYCHIATRIC:  Mood and affect were normal and the patient had normal recent and remote memory. The patient's judgment and insight were normal.

## 2021-05-31 VITALS — WEIGHT: 151 LBS | BODY MASS INDEX: 28.51 KG/M2 | HEIGHT: 61 IN

## 2021-05-31 VITALS — WEIGHT: 152 LBS | BODY MASS INDEX: 29.84 KG/M2 | HEIGHT: 60 IN

## 2021-05-31 VITALS — HEIGHT: 60 IN | WEIGHT: 153 LBS | BODY MASS INDEX: 30.04 KG/M2

## 2021-05-31 VITALS — BODY MASS INDEX: 29.88 KG/M2 | WEIGHT: 152.2 LBS | HEIGHT: 60 IN

## 2021-06-01 VITALS — WEIGHT: 150 LBS | BODY MASS INDEX: 28.32 KG/M2 | HEIGHT: 61 IN

## 2021-06-01 VITALS — BODY MASS INDEX: 28.55 KG/M2 | WEIGHT: 151.13 LBS

## 2021-06-02 VITALS — WEIGHT: 150 LBS | BODY MASS INDEX: 28.32 KG/M2 | HEIGHT: 61 IN

## 2021-06-02 VITALS — HEIGHT: 61 IN | BODY MASS INDEX: 27.94 KG/M2 | WEIGHT: 148 LBS

## 2021-06-03 VITALS — HEIGHT: 60 IN | BODY MASS INDEX: 29.55 KG/M2 | WEIGHT: 150.5 LBS

## 2021-06-03 NOTE — TELEPHONE ENCOUNTER
Controlled Substance Refill Request  Medication:   Requested Prescriptions     Pending Prescriptions Disp Refills     traMADol (ULTRAM) 50 mg tablet [Pharmacy Med Name: TRAMADOL HCL 50 MG TABLET] 28 tablet      Sig: TAKE 1 TABLET BY MOUTH EVERY 6 HOURS AS NEEDED FOR PAIN     Date Last Fill: 5/13/19  [id number]   Submit electronically to pharmacy  Controlled Substance Agreement on File:   Encounter-Level CSA Scan Date:    There are no encounter-level csa scan date.       Last office visit: 7/19/2019 Kayley Johnson MD Leslie White, RN BSBA [facility] Triage/Med Refill 11/25/2019 6:56 AM

## 2021-06-04 VITALS
WEIGHT: 154 LBS | DIASTOLIC BLOOD PRESSURE: 82 MMHG | HEART RATE: 66 BPM | HEIGHT: 61 IN | OXYGEN SATURATION: 98 % | SYSTOLIC BLOOD PRESSURE: 124 MMHG | BODY MASS INDEX: 29.07 KG/M2

## 2021-06-04 VITALS
OXYGEN SATURATION: 97 % | HEART RATE: 70 BPM | SYSTOLIC BLOOD PRESSURE: 114 MMHG | HEIGHT: 61 IN | WEIGHT: 154 LBS | BODY MASS INDEX: 29.07 KG/M2 | DIASTOLIC BLOOD PRESSURE: 64 MMHG

## 2021-06-05 ENCOUNTER — RECORDS - HEALTHEAST (OUTPATIENT)
Dept: INTERNAL MEDICINE | Facility: CLINIC | Age: 71
End: 2021-06-05

## 2021-06-05 VITALS — WEIGHT: 156 LBS | BODY MASS INDEX: 29.45 KG/M2 | HEIGHT: 61 IN

## 2021-06-05 VITALS
OXYGEN SATURATION: 97 % | DIASTOLIC BLOOD PRESSURE: 68 MMHG | BODY MASS INDEX: 29.56 KG/M2 | HEART RATE: 66 BPM | WEIGHT: 156.56 LBS | HEIGHT: 61 IN | RESPIRATION RATE: 18 BRPM | SYSTOLIC BLOOD PRESSURE: 128 MMHG

## 2021-06-05 DIAGNOSIS — I65.29 OCCLUSION AND STENOSIS OF CAROTID ARTERY: ICD-10-CM

## 2021-06-05 DIAGNOSIS — I65.8 OCCLUSION AND STENOSIS OF MULTIPLE AND BILATERAL PRECEREBRAL ARTERIES: ICD-10-CM

## 2021-06-05 NOTE — TELEPHONE ENCOUNTER
Controlled Substance Refill Request  Medication Name:   Requested Prescriptions     Pending Prescriptions Disp Refills     traMADol (ULTRAM) 50 mg tablet [Pharmacy Med Name: TRAMADOL HCL 50 MG TABLET] 20 tablet 0     Sig: TAKE 1 TABLET BY MOUTH EVERY 6 HOURS AS NEEDED FOR PAIN     Date Last Fill: 11/25/19  [facility]  Submit electronically to pharmacy  Controlled Substance Agreement on file:   Encounter-Level CSA Scan Date:    There are no encounter-level csa scan date.        Last office visit:  2/5/20

## 2021-06-05 NOTE — PROGRESS NOTES
Assessment and Plan:   Annual wellness forms reviewed.  No new needs identified    1. Encounter for preventive measure  Up-to-date on mammography, bone densitometry, colon cancer screening and immunizations.  She is exercising regularly and maintaining a stable body mass index.  Have discussed the importance of vitamin D and calcium with regard to bone health.  She is doing downward dog/yoga activities to strengthen her core.    Have discussed the importance of healthy eating, bone health.  Have reviewed labs that were drawn ahead of this visit    2. Other urinary incontinence  We will check UA UC.  Urinary leakage likely secondary to atrophic vaginitis  - Urinalysis-UC if Indicated    3. Atrophic vaginitis  As above, will reorder Estrace.  - estradiol (ESTRACE) 0.01 % (0.1 mg/gram) vaginal cream; Insert 1 g into the vagina daily. One gram every mon, wed, friday  Dispense: 42.5 g; Refill: 3    4. Acquired hypothyroidism  TSH was low.  Therefore, levothyroxine dose was decreased a bit.  She will drop in in 2 to 3 months for TSH  - Thyroid Stimulating Hormone (TSH); Future    5. Hypercholesterolemia  At goal on high intensity statin    6. Stenosis of carotid artery, unspecified laterality  Stable at 50%-CT a reviewed    7. Essential hypertension  Stable on current medications      8. Tinnitus, bilateral  Some reported hearing loss.  Exam unremarkable  - Ambulatory referral to Audiology     The patient's current medical problems were reviewed.      The following health maintenance schedule was reviewed with the patient and provided in printed form in the after visit summary:   Health Maintenance   Topic Date Due     DXA SCAN  01/28/2021     MEDICARE ANNUAL WELLNESS VISIT  02/05/2021     FALL RISK ASSESSMENT  02/05/2021     MAMMOGRAM  03/25/2021     TD 18+ HE  07/15/2021     ADVANCE CARE PLANNING  01/22/2024     LIPID  01/29/2025     COLONOSCOPY  12/15/2027     HEPATITIS C SCREENING  Completed     PNEUMOCOCCAL  IMMUNIZATION 65+ LOW/MEDIUM RISK  Completed     INFLUENZA VACCINE RULE BASED  Completed     ZOSTER VACCINES  Completed        Subjective:   Chief Complaint: Xiomy Gan is an 69 y.o. female here for an Annual Wellness visit.   HPI: Hue is a delightful 69-year-old female who is here today for a wellness visit.  Of note, she has no chief complaints.    Health maintenance is reviewed and updated in the electronic health record.        Review of Systems: She does report some urinary leakage.  This is slightly intensified since she went off of her Estrace.  She has a history of atrophic vaginitis.  She has seen urology in the past.  She declined physical therapy but will consider Kegle exercises.  Please see above.  The rest of the review of systems are negative for all systems.    Patient Care Team:  Kayley Johnson MD as PCP - Kaley Horton MD as Physician (Cardiology)  Lindy Fragoso MD (Dermatology)  Zack Coello MBBS as Physician (Rheumatology)  Kayley Johnson MD as Assigned PCP     Patient Active Problem List   Diagnosis     Essential hypertension     Diffuse Joint Pains (Arthralgias)     Cholelithiasis     Hypothyroidism     Vitamin D Deficiency     Hypercholesterolemia     Coronary Artery Disease     Carotid artery disease (H)     Osteopenia     Osteoarthrosis Of Multiple Sites     Ganglion cyst of flexor tendon sheath of finger, right     Chondrocalcinosis     Greater trochanteric bursitis of both hips     Palpitations     Past Medical History:   Diagnosis Date     Arthralgia      Breast cyst y-10     Carotid artery stenosis      Cholelithiasis      Chondrocalcinosis      Coronary artery disease      Hyperlipidemia      Hypertension      Hypothyroidism      Myocardial infarction (H)      Nocturia      Osteoarthrosis      Osteopenia      Palpitations 10/5/2017     Vitamin D deficiency       Past Surgical History:   Procedure Laterality Date     BREAST CYST ASPIRATION Left      long time ago     LAPAROSCOPIC CHOLECYSTECTOMY N/A 2015    Procedure: LAPAROSCOPIC CHOLECYSTECTOMY ;  Surgeon: Bao Hanley MD;  Location: [facility] OR;  Service:      NV  DELIVERY ONLY      Description:  Section;  Recorded: 2012;  Comments: three  Annotations: Had 3 C-sections     NV KNEE SCOPE,DIAGNOSTIC      Description: Arthroscopy Knee Right;  Recorded: 2009;     TUBAL LIGATION        Family History   Problem Relation Age of Onset     Diabetes Mother      Parkinsonism Father      Diabetes Sister      Heart disease Sister      Heart disease Brother      Arthritis Son      Psoriasis Son      Rheum arthritis Son       Social History     Socioeconomic History     Marital status:      Spouse name: Not on file     Number of children: Not on file     Years of education: Not on file     Highest education level: Not on file   Occupational History     Not on file   Social Needs     Financial resource strain: Not on file     Food insecurity:     Worry: Not on file     Inability: Not on file     Transportation needs:     Medical: Not on file     Non-medical: Not on file   Tobacco Use     Smoking status: Former Smoker     Packs/day: 0.50     Years: 20.00     Pack years: 10.00     Last attempt to quit: 1985     Years since quittin.1     Smokeless tobacco: Never Used   Substance and Sexual Activity     Alcohol use: Yes     Alcohol/week: 2.0 - 3.0 standard drinks     Types: 2 - 3 Cans of beer per week     Drug use: No     Sexual activity: Not on file   Lifestyle     Physical activity:     Days per week: Not on file     Minutes per session: Not on file     Stress: Not on file   Relationships     Social connections:     Talks on phone: Not on file     Gets together: Not on file     Attends Mu-ism service: Not on file     Active member of club or organization: Not on file     Attends meetings of clubs or organizations: Not on file     Relationship status: Not on file  "    Intimate partner violence:     Fear of current or ex partner: Not on file     Emotionally abused: Not on file     Physically abused: Not on file     Forced sexual activity: Not on file   Other Topics Concern     Not on file   Social History Narrative    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  She is .      Current Outpatient Medications   Medication Sig Dispense Refill     acetaminophen (TYLENOL) 500 MG tablet Take 1,000 mg by mouth every 6 (six) hours as needed.       ASPIRIN (ASPIR-81 ORAL) Take 1 tablet by mouth daily.       gabapentin (NEURONTIN) 300 MG capsule TAKE 1 CAPSULE BY MOUTH THREE TIMES A  capsule 1     levothyroxine (SYNTHROID, LEVOTHROID) 100 MCG tablet TAKE 1 TABLET EVERY MORNING AT 6 A.M. 90 tablet 3     lisinopril (PRINIVIL,ZESTRIL) 10 MG tablet TAKE 1 TABLET DAILY 90 tablet 3     metoprolol succinate (TOPROL-XL) 50 MG 24 hr tablet TAKE ONE TABLET BY MOUTH ONE TIME DAILY 90 tablet 3     omeprazole (PRILOSEC) 20 MG capsule Take 1 capsule (20 mg total) by mouth daily. Prn 90 capsule 3     rosuvastatin (CRESTOR) 40 MG tablet TAKE ONE TABLET BY MOUTH ONE TIME DAILY 90 tablet 3     traMADol (ULTRAM) 50 mg tablet TAKE 1 TABLET BY MOUTH EVERY 6 HOURS AS NEEDED FOR PAIN 20 tablet 0     estradiol (ESTRACE) 0.01 % (0.1 mg/gram) vaginal cream Insert 1 g into the vagina daily. One gram every mon, wed, friday 42.5 g 3     No current facility-administered medications for this visit.       Objective:   Vital Signs:   Visit Vitals  /82 (Patient Site: Left Arm, Patient Position: Sitting, Cuff Size: Adult Large)   Pulse 66   Ht 5' 1\" (1.549 m)   Wt 154 lb (69.9 kg)   LMP 06/24/1999   SpO2 98%   BMI 29.10 kg/m         VisionScreening:  No exam data present     PHYSICAL EXAM  EYES: Eyelids, conjunctiva, and sclera were normal. Pupils were normal. Cornea, iris, and lens were normal bilaterally.  HEAD, EARS, NOSE, MOUTH, AND THROAT: Head and face were normal. Hearing was normal to voice and the " ears were normal to external exam. Nose appearance was normal and there was no discharge. Oropharynx was normal.  TMs were normal.  NECK: Neck appearance was normal. There were no neck masses and the thyroid was not enlarged.  RESPIRATORY: Breathing pattern was normal and the chest moved symmetrically.   Lung sounds were equal bilaterally.  CARDIOVASCULAR: Heart rate and rhythm were normal.  S1 and S2 were normal and there were no extra sounds or murmurs. Peripheral pulses in arms and legs were normal.  Jugular venous pressure was normal.  There was no peripheral edema.  GASTROINTESTINAL: The abdomen was normal in contour.  Bowel sounds were present.   Palpation detected no tenderness, mass, or enlarged organs.   MUSCULOSKELETAL: Skeletal configuration was normal and muscle mass was normal for age. Joint appearance was overall normal.  LYMPHATIC: There were no enlarged nodes.  SKIN/HAIR/NAILS: Skin color was normal.  There were no abnormal skin lesions.  Hair and nails were normal.  NEUROLOGIC: The patient was alert and oriented to person, place, time, and circumstance. Speech was normal. Cranial nerves were normal. Motor strength was normal for age. The patient was normally coordinated.  PSYCHIATRIC:  Mood and affect were normal and the patient had normal recent and remote memory. The patient's judgment and insight were normal.  BREASTS: Examined bilaterally and within normal limits        Assessment Results 2/5/2020   Activities of Daily Living No help needed   Instrumental Activities of Daily Living No help needed   Get Up and Go Score Less than 12 seconds   Mini Cog Total Score 5   Some recent data might be hidden     A Mini-Cog score of 0-2 suggests the possibility of dementia, score of 3-5 suggests no dementia    Identified Health Risks:     The patient was provided with written information regarding signs of hearing loss.  Information on urinary incontinence and treatment options given to patient.  Patient's  advanced directive was discussed and I am comfortable with the patient's wishes.

## 2021-06-05 NOTE — TELEPHONE ENCOUNTER
RN cannot approve Refill Request    RN can NOT refill this medication medication not on med list. Last office visit: 7/19/2019 Kayley Johnson MD Last Physical: 1/22/2019 Last MTM visit: Visit date not found Last visit same specialty: 7/19/2019 Kayley Johnson MD.  Next visit within 3 mo: Visit date not found  Next physical within 3 mo: Visit date not found      Marjorie Bianchi, [facility] Triage/Med Refill 1/24/2020    Requested Prescriptions   Pending Prescriptions Disp Refills     gabapentin (NEURONTIN) 300 MG capsule [Pharmacy Med Name: GABAPENTIN 300 MG CAPSULE] 270 capsule 1     Sig: TAKE 1 CAPSULE BY MOUTH THREE TIMES A DAY       Gabapentin/Levetiracetam/Tiagabine Refill Protocol  Passed - 1/24/2020  8:35 AM        Passed - PCP or prescribing provider visit in past 12 months or next 3 months     Last office visit with prescriber/PCP: 7/19/2019 Kayley Johnson MD OR same dept: 7/19/2019 Kayley Johnson MD OR same specialty: 7/19/2019 Kayley Johnson MD  Last physical: 1/22/2019 Last MTM visit: Visit date not found   Next visit within 3 mo: Visit date not found  Next physical within 3 mo: Visit date not found  Prescriber OR PCP: Kayley Johnson MD  Last diagnosis associated with med order: There are no diagnoses linked to this encounter.  If protocol passes may refill for 12 months if within 3 months of last provider visit (or a total of 15 months).

## 2021-06-06 NOTE — TELEPHONE ENCOUNTER
Who is calling:  Patient   Reason for Call:  Patient is calling in for follow up on below refill medication patient states she is going out of town on Wednesday morning requesting to process as soon as possible . If provider is not aviable requesting covering provider to approve the medication .  Date of last appointment with primary care: 02/05/2020  Okay to leave a detailed message: No

## 2021-06-06 NOTE — TELEPHONE ENCOUNTER
Levothyroxine refill requested too soon: 100 mcg filled 2/5/2020 #90 tablets with 3 refills.      Refill Approved - metoprolol succinate    Rx renewed per Medication Renewal Policy. Medication was last renewed on 2/18/19.    Daylin Damon, [facility] Triage/Med Refill 2/23/2020     Requested Prescriptions   Pending Prescriptions Disp Refills     metoprolol succinate (TOPROL-XL) 50 MG 24 hr tablet [Pharmacy Med Name: METOPROLOL SUCC ER 50 MG TAB] 90 tablet 3     Sig: TAKE 1 TABLET BY MOUTH EVERY DAY       Beta-Blockers Refill Protocol Passed - 2/20/2020  9:26 AM        Passed - PCP or prescribing provider visit in past 12 months or next 3 months     Last office visit with prescriber/PCP: 7/19/2019 Kayley Johnson MD OR same dept: 7/19/2019 Kayley Johnson MD OR same specialty: 7/19/2019 Kayley Johnson MD  Last physical: 2/5/2020 Last MTM visit: Visit date not found   Next visit within 3 mo: Visit date not found  Next physical within 3 mo: Visit date not found  Prescriber OR PCP: Kayley Johnson MD  Last diagnosis associated with med order: 1. Acquired hypothyroidism  - metoprolol succinate (TOPROL-XL) 50 MG 24 hr tablet [Pharmacy Med Name: METOPROLOL SUCC ER 50 MG TAB]; TAKE 1 TABLET BY MOUTH EVERY DAY  Dispense: 90 tablet; Refill: 3    2. Essential hypertension  - levothyroxine (SYNTHROID, LEVOTHROID) 112 MCG tablet [Pharmacy Med Name: LEVOTHYROXINE 112 MCG TABLET]; TAKE 1 TABLET EVERY MORNING AT 6 A.M.  Dispense: 90 tablet; Refill: 3    If protocol passes may refill for 12 months if within 3 months of last provider visit (or a total of 15 months).             Passed - Blood pressure filed in past 12 months     BP Readings from Last 1 Encounters:   02/05/20 124/82             levothyroxine (SYNTHROID, LEVOTHROID) 112 MCG tablet [Pharmacy Med Name: LEVOTHYROXINE 112 MCG TABLET] 90 tablet 3     Sig: TAKE 1 TABLET EVERY MORNING AT 6 A.M.       Thyroid Hormones Protocol Passed - 2/20/2020  9:26 AM         Passed - Provider visit in past 12 months or next 3 months     Last office visit with prescriber/PCP: 7/19/2019 Kayley Johnson MD OR same dept: 7/19/2019 Kayley Johnson MD OR same specialty: 7/19/2019 Kayley Johnson MD  Last physical: 2/5/2020 Last MTM visit: Visit date not found   Next visit within 3 mo: Visit date not found  Next physical within 3 mo: Visit date not found  Prescriber OR PCP: Kayley Johnson MD  Last diagnosis associated with med order: 1. Acquired hypothyroidism  - metoprolol succinate (TOPROL-XL) 50 MG 24 hr tablet [Pharmacy Med Name: METOPROLOL SUCC ER 50 MG TAB]; TAKE 1 TABLET BY MOUTH EVERY DAY  Dispense: 90 tablet; Refill: 3    2. Essential hypertension  - levothyroxine (SYNTHROID, LEVOTHROID) 112 MCG tablet [Pharmacy Med Name: LEVOTHYROXINE 112 MCG TABLET]; TAKE 1 TABLET EVERY MORNING AT 6 A.M.  Dispense: 90 tablet; Refill: 3    If protocol passes may refill for 12 months if within 3 months of last provider visit (or a total of 15 months).             Passed - TSH on file in past 12 months for patient age 12 & older     TSH   Date Value Ref Range Status   01/29/2020 0.10 (L) 0.30 - 5.00 uIU/mL Final

## 2021-06-06 NOTE — TELEPHONE ENCOUNTER
Refill Approved    Rx renewed per Medication Renewal Policy. Medication was last renewed on 2/18/19.      Teresa Tidwell, [facility] Triage/Med Refill 3/6/2020     Requested Prescriptions   Pending Prescriptions Disp Refills     rosuvastatin (CRESTOR) 40 MG tablet [Pharmacy Med Name: ROSUVASTATIN CALCIUM 40 MG TAB] 90 tablet 3     Sig: TAKE 1 TABLET BY MOUTH EVERY DAY       Statins Refill Protocol (Hmg CoA Reductase Inhibitors) Passed - 3/6/2020  1:26 AM        Passed - PCP or prescribing provider visit in past 12 months      Last office visit with prescriber/PCP: 7/19/2019 Kayley Johnson MD OR same dept: 7/19/2019 Kayley Johnson MD OR same specialty: 7/19/2019 Kayley Johnson MD  Last physical: 2/5/2020 Last MTM visit: Visit date not found   Next visit within 3 mo: Visit date not found  Next physical within 3 mo: Visit date not found  Prescriber OR PCP: Kayley Johnson MD  Last diagnosis associated with med order: 1. Hypercholesterolemia  - rosuvastatin (CRESTOR) 40 MG tablet [Pharmacy Med Name: ROSUVASTATIN CALCIUM 40 MG TAB]; TAKE 1 TABLET BY MOUTH EVERY DAY  Dispense: 90 tablet; Refill: 3    2. Acquired hypothyroidism  - lisinopriL (PRINIVIL,ZESTRIL) 10 MG tablet [Pharmacy Med Name: LISINOPRIL 10 MG TABLET]; TAKE 1 TABLET BY MOUTH EVERY DAY  Dispense: 90 tablet; Refill: 3    If protocol passes may refill for 12 months if within 3 months of last provider visit (or a total of 15 months).             lisinopriL (PRINIVIL,ZESTRIL) 10 MG tablet [Pharmacy Med Name: LISINOPRIL 10 MG TABLET] 90 tablet 3     Sig: TAKE 1 TABLET BY MOUTH EVERY DAY       Ace Inhibitors Refill Protocol Passed - 3/6/2020  1:26 AM        Passed - PCP or prescribing provider visit in past 12 months       Last office visit with prescriber/PCP: 7/19/2019 Kayley Johnson MD OR same dept: 7/19/2019 Kayley Johnson MD OR same specialty: 7/19/2019 Kayley Johnson MD  Last physical: 2/5/2020 Last MTM visit: Visit  date not found   Next visit within 3 mo: Visit date not found  Next physical within 3 mo: Visit date not found  Prescriber OR PCP: Kayley Johnson MD  Last diagnosis associated with med order: 1. Hypercholesterolemia  - rosuvastatin (CRESTOR) 40 MG tablet [Pharmacy Med Name: ROSUVASTATIN CALCIUM 40 MG TAB]; TAKE 1 TABLET BY MOUTH EVERY DAY  Dispense: 90 tablet; Refill: 3    2. Acquired hypothyroidism  - lisinopriL (PRINIVIL,ZESTRIL) 10 MG tablet [Pharmacy Med Name: LISINOPRIL 10 MG TABLET]; TAKE 1 TABLET BY MOUTH EVERY DAY  Dispense: 90 tablet; Refill: 3    If protocol passes may refill for 12 months if within 3 months of last provider visit (or a total of 15 months).             Passed - Serum Potassium in past 12 months     Lab Results   Component Value Date    Potassium 4.3 01/29/2020             Passed - Blood pressure filed in past 12 months     BP Readings from Last 1 Encounters:   02/05/20 124/82             Passed - Serum Creatinine in past 12 months     Creatinine   Date Value Ref Range Status   01/29/2020 0.71 0.60 - 1.10 mg/dL Final

## 2021-06-07 NOTE — TELEPHONE ENCOUNTER
Medication: Tramadol  Last Date Filled 3/27/20   pulled: YES    Only PCP Prescribing? : YES  Taken as prescribed from physician notes? YES    CSA in last year: YES  Random Utox in last year: NO  (if any of the above answer NO - schedule with PCP)     Opioids + benzodiazepines? YES  (if the above answer YES - schedule with PCP every 6 months)     Is patient on the Executive Review Team? no      All responses suggest: Refilling prescription

## 2021-06-07 NOTE — TELEPHONE ENCOUNTER
Controlled Substance Refill Request  Medication Name:   Requested Prescriptions     Pending Prescriptions Disp Refills     traMADoL (ULTRAM) 50 mg tablet [Pharmacy Med Name: TRAMADOL HCL 50 MG TABLET] 20 tablet 0     Sig: TAKE 1 TABLET BY MOUTH EVERY 6 HOURS AS NEEDED FOR PAIN     Date Last Fill: 2/10/20  Requested Pharmacy: [facility]  Submit electronically to pharmacy  Controlled Substance Agreement on file:   Encounter-Level CSA Scan Date:    There are no encounter-level csa scan date.        Last office visit:  2/5/20

## 2021-06-07 NOTE — TELEPHONE ENCOUNTER
Controlled Substance Refill Request  Medication Name:   Requested Prescriptions     Pending Prescriptions Disp Refills     traMADoL (ULTRAM) 50 mg tablet [Pharmacy Med Name: TRAMADOL HCL 50 MG TABLET] 20 tablet 0     Sig: TAKE 1 TABLET BY MOUTH EVERY 6 HOURS AS NEEDED FOR PAIN     Date Last Fill: 3/27/20  [facility]ed [facility]  Submit electronically to pharmacy  Controlled Substance Agreement on file:   Encounter-Level CSA Scan Date:    There are no encounter-level csa scan date.        Last office visit:  2/5/20

## 2021-06-08 NOTE — PROGRESS NOTES
ASSESSMENT AND PLAN:  Xiomy Gan 66 y.o. female has arthralgias, widespread primary osteoarthritis.  She carries a diagnosis of chondrocalcinosis.  She's also been informed that she has pseudogout.  This has troubled her over the past 3-4 years, with a few days of pain lasting a week or so occurring once every 2-3 months for which she takes prednisone.  In the past we had talked about the negatives of this approach.  She has coronary artery disease status post MI.  I've asked her to consider taking tramadol instead of Celebrex.  She wants to go ahead with that.  No history of seizure disorder.  She has trochanteric bursitis.  I offered her local corticosteroid injection she wants to defer.  Return for follow-up in 3 months.    .    Diagnoses and all orders for this visit:    Primary osteoarthritis involving multiple joints  -     traMADol (ULTRAM) 50 mg tablet; Take 1 tablet (50 mg total) by mouth every 8 (eight) hours as needed for pain.  Dispense: 90 tablet; Refill: 2    Pain in joint  -     traMADol (ULTRAM) 50 mg tablet; Take 1 tablet (50 mg total) by mouth every 8 (eight) hours as needed for pain.  Dispense: 90 tablet; Refill: 2    Chondrocalcinosis  -     traMADol (ULTRAM) 50 mg tablet; Take 1 tablet (50 mg total) by mouth every 8 (eight) hours as needed for pain.  Dispense: 90 tablet; Refill: 2    Greater trochanteric bursitis of both hips      HISTORY OF PRESENTING ILLNESS:  Xiomy Gan, 66 y.o., female is here for osteoarthritis.  She's been told that she has pseudogout.  She recalls that it was almost 30 years ago when she had knee pain and swelling and had arthroscopic surgery and was told that she had pseudogout crystals.  Over the subsequent many years she would get pain and ache and various joint areas such as the wrists, knees, and hips.  This she would typically treat with a few days of prednisone.  She will take prednisone for about a week or so.  And these episodes recur approximately at  a rate of about once every 2-3 months.  She has noted no swelling or redness or warmth during these episodes it is more of her pain.  She noted that this pattern has been there for the past 3-4 years.  Prior to that for several years and this was not the case.  Her son and she understands has a psoriatic arthritis.  She does not have psoriasis on her side of the family.  There is amitriptyline noted on her list of medications which she understands is also for her pseudogout.  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  She has multiple comorbidities as noted.  This includes coronary artery disease.  She is status post stent placement.  She noted that at its peak the pain can be moderately severe.  Typically she is not stiff in the morning beyond the 10-15 minutes.  She's been able to continue to all her day-to-day activities without difficulty.  She is a neither a smoker she takes alcohol 2-3 drinks per week.  She likes to exercise regularly including swimming, yoga, and cough.  She has history of hypertension in the background.  She's had blurry vision and dryness of mouth amongst her review of systems.  At one point she had pain in her right shoulder she feels perhaps due to the repetitive activity playing with her grandchildren and had a corticosteroid injection subacromially at the orthopedics which has provided her with excellent relief.    Further historical information and ADL limitations as noted in the multidimensional health assessment questionnaire attached in the EMR.      ALLERGIES:Review of patient's allergies indicates no known allergies.    PAST MEDICAL/ACTIVE PROBLEMS/MEDICATION/ FAMILY HISTORY/SOCIAL DATA:  The patient has a family history of  Past Medical History   Diagnosis Date     Arthralgia      Breast cyst y-10     Carotid artery stenosis      Cholelithiasis      Chondrocalcinosis      Coronary artery disease      Hyperlipidemia      Hypertension      Hypothyroidism      Myocardial  infarction      Nocturia      Osteoarthrosis      Osteopenia      Vitamin D deficiency      History   Smoking Status     Former Smoker     Packs/day: 0.50     Years: 20.00     Quit date: 1/1/1985   Smokeless Tobacco     Never Used     Patient Active Problem List   Diagnosis     Urinary Frequency More Than Twice At Night (Nocturia)     Hypertension     Diffuse Joint Pains (Arthralgias)     Cholelithiasis     Hypothyroidism     Acute Myocardial Infarction Of The Inferior Wall     Vitamin D Deficiency     Hyperlipidemia     Coronary Artery Disease     Carotid Artery Stenosis     Osteopenia     Osteoarthrosis Of Multiple Sites     Ganglion cyst of flexor tendon sheath of finger, right     Chondrocalcinosis     Current Outpatient Prescriptions   Medication Sig Dispense Refill     acetaminophen (TYLENOL) 500 MG tablet Take 1,000 mg by mouth every 6 (six) hours as needed.       amitriptyline (ELAVIL) 25 MG tablet TAKE ONE TABLET BY MOUTH ONE TIME DAILY AND 2 TABS AT BEDTIME 270 tablet 1     ASPIRIN (ASPIR-81 ORAL) Take 1 tablet by mouth daily.       b complex vitamins tablet Take 1 tablet by mouth daily.       CALCIUM-MAGNESIUM-VITAMIN D2 ORAL Take by mouth daily. Calcium-magnesium-vitamin D 200-100-33.3 MG-MG-unit oral capsule       celecoxib (CELEBREX) 200 MG capsule TAKE 1 CAPSULE BY MOUTH ONCE DAILY. 90 capsule 0     cholecalciferol, vitamin D3, (VITAMIN D3) 2,000 unit cap Take 1 capsule by mouth daily.       coenzyme Q10 (CO Q-10) 10 mg capsule Take 400 mg by mouth daily.        DOCOSAHEXANOIC ACID/EPA (FISH OIL ORAL) Take 2 tablets by mouth daily.        ESTRACE 0.01 % (0.1 mg/gram) vaginal cream APPLY 1G INTO THE VAGINA TWICE WEEKLY 42.5 g 2     GLUC/AMENA-MSM#1/C/KATELYN/MELLY/BOR (OSTEO BI-FLEX TRIPLE STRENGTH ORAL) Take 2 tablets by mouth daily.       levothyroxine (SYNTHROID, LEVOTHROID) 100 MCG tablet TAKE ONE TABLET BY MOUTH ONE TIME EACH MORNING AT 6AM 90 tablet 3     lisinopril (PRINIVIL,ZESTRIL) 10 MG tablet TAKE  ONE TABLET BY MOUTH ONE TIME DAILY 90 tablet 3     metoprolol succinate (TOPROL-XL) 50 MG 24 hr tablet TAKE ONE TABLET BY MOUTH ONE TIME DAILY 90 tablet 0     metroNIDAZOLE (METROCREAM) 0.75 % cream APPLY TOPICALLY TO AFFECTED AREA ON FACE TWICE A DAY  11     multivitamin (MULTIVITAMIN) per tablet Take 1 tablet by mouth daily.       omeprazole (PRILOSEC) 20 MG capsule Take 1 capsule (20 mg total) by mouth daily. Prn 90 capsule 3     rosuvastatin (CRESTOR) 40 MG tablet TAKE ONE TABLET BY MOUTH ONE TIME DAILY 90 tablet 0     No current facility-administered medications for this visit.        DETAILED EXAMINATION:  Vitals:    01/30/17 0913   BP: 138/70   Patient Site: Right Arm   Patient Position: Sitting   Cuff Size: Adult Regular   Pulse: 70   Weight: 152 lb 3.2 oz (69 kg)    Comfortable.  Alert oriented.  Eyes are without inflammatory changes.  Examination of both upper and lower extremities is performed for swollen & tender joints, range of motion, rash, weakness, discoloration, warmth, swelling.  The skin examined for nodules. The salient normal / abnormal findings are appended.   She has Heberden nodes, she has JLT of the knees.  She does not have synovitis started on today's examination in any of the palpable appendicular joints.  There are no tophaceous deposits seen.  She is tender in the right and left trochanteric area.  She is tenderness of the right wrist.  Without other inflammatory changes.    LAB / IMAGING DATA:  ALT   Date Value Ref Range Status   01/03/2017 36 0 - 45 U/L Final   08/29/2016 34 0 - 45 U/L Final   12/11/2015 47 12 - 78 U/L Final     ALBUMIN   Date Value Ref Range Status   01/03/2017 4.0 3.5 - 5.0 g/dL Final   08/29/2016 4.2 3.5 - 5.0 g/dL Final   12/11/2015 4.2 3.5 - 5.0 g/dL Final     CREATININE   Date Value Ref Range Status   01/03/2017 0.67 0.60 - 1.10 mg/dL Final   08/29/2016 0.75 0.60 - 1.10 mg/dL Final   12/11/2015 0.78 0.60 - 1.10 mg/dL Final     Comment:       New Creatinine  method with new reference ranges as of 9/14/15       WBC   Date Value Ref Range Status   12/30/2016 5.0 4.0 - 11.0 thou/uL Final   08/29/2016 4.3 4.0 - 11.0 thou/uL Final   05/19/2015 4.1 4.0 - 11.0 thou/uL Final   02/19/2015 4.2 4.0 - 11.0 thou/uL Final     HEMOGLOBIN   Date Value Ref Range Status   12/30/2016 14.0 12.0 - 16.0 g/dL Final   08/29/2016 13.5 12.0 - 16.0 g/dL Final   05/19/2015 14.0 12.0 - 16.0 g/dL Final     PLATELETS   Date Value Ref Range Status   12/30/2016 183 140 - 440 thou/uL Final   08/29/2016 204 140 - 440 thou/uL Final   05/19/2015 212 140 - 440 thou/uL Final       Lab Results   Component Value Date    RIGO 0.28 11/09/2012    RF <15 11/09/2012    SEDRATE 8 08/29/2016           - - -

## 2021-06-09 NOTE — TELEPHONE ENCOUNTER
RN cannot approve Refill Request    RN can NOT refill this medication med is not covered by policy/route to provider. Last office visit: 7/19/2019 Kayley Johnson MD Last Physical: 2/5/2020 Last MTM visit: Visit date not found Last visit same specialty: 7/19/2019 Kayley Johnson MD.  Next visit within 3 mo: Visit date not found  Next physical within 3 mo: Visit date not found      Nelsy Escalante, [facility] Triage/Med Refill 7/11/2020    Requested Prescriptions   Pending Prescriptions Disp Refills     celecoxib (CELEBREX) 200 MG capsule [Pharmacy Med Name: CELECOXIB 200 MG CAPSULE] 90 capsule 3     Sig: TAKE 1 CAPSULE BY MOUTH EVERY DAY       There is no refill protocol information for this order

## 2021-06-09 NOTE — TELEPHONE ENCOUNTER
This patient is coming in for fasting labs on 8/7/20 and there are no current orders in. Please advise. Thank you!

## 2021-06-10 NOTE — TELEPHONE ENCOUNTER
Controlled Substance Refill Request  Medication Name:   Requested Prescriptions     Pending Prescriptions Disp Refills     estradioL (ESTRACE) 0.01 % (0.1 mg/gram) vaginal cream [Pharmacy Med Name: ESTRADIOL 0.01% CREAM] 42.5 g 3     Sig: INSERT 1 GRAM INTO THE VAGINA DAILY. ONE GRAM EVERY MON, WED, FRIDAY     traMADoL (ULTRAM) 50 mg tablet [Pharmacy Med Name: TRAMADOL HCL 50 MG TABLET] 20 tablet 0     Sig: TAKE 1 TABLET BY MOUTH EVERY 6 HOURS AS NEEDED FOR PAIN     Date Last Fill: 5/5/20  [facility]  Submit electronically to pharmacy  Controlled Substance Agreement on file:   Encounter-Level CSA Scan Date:    There are no encounter-level csa scan date.        Last office visit:  2/5/20         Rx renewed per Medication Renewal policy  estrace

## 2021-06-10 NOTE — PROGRESS NOTES
ASSESSMENT AND PLAN:  Xiomy Gan 66 y.o. female has arthralgias, widespread primary osteoarthritis.  She carries a diagnosis, made elsewhere, of pseudogout.  She has not had any acute joint symptoms during the past 4 months.  She is not a candidate to take nonsteroidals in view of coronary artery disease, status post MI.  She has found on tramadol does help her.  She has the option of injecting corticosteroids or Visco supplementation in her knees where she has severe osteoarthritis particularly the patellofemoral space.  Should she have a flareup of what she recognizes as pseudogout I have asked her to return forth with otherwise return for follow-up this time in 4 months.    This has troubled her over the past 3-4 years, with a few days of pain lasting a week or so occurring once every 2-3 months for which she takes prednisone.  In the past we had talked about the negatives of this approach.  She has coronary artery disease status post MI.  I've asked her to consider taking tramadol instead of Celebrex.  She wants to go ahead with that.  No history of seizure disorder.  She has trochanteric bursitis.  I offered her local corticosteroid injection she wants to defer.  Return for follow-up in 3 months.    .    Diagnoses and all orders for this visit:    Primary osteoarthritis involving multiple joints    Greater trochanteric bursitis of both hips    Coronary atherosclerosis    HISTORY OF PRESENTING ILLNESS:  Xiomy Gan, 66 y.o., female is here for osteoarthritis.  She has the worst of her pains in her knees and hips.  This is with ambulation.  She rated this as moderately severe.  This is worse on the right.  The tramadol does help some.  She rated the pain at 6.0/10.  She also notices change of whether has a deleterious effect.   Typically she is not stiff in the morning beyond the 10-15 minutes.  She's been able to continue to all her day-to-day activities without difficulty.  She is a neither a smoker  she takes alcohol 2-3 drinks per week.  She likes to exercise regularly including swimming, yoga, and cough.  She has history of hypertension in the background.  She's had blurry vision and dryness of mouth amongst her review of systems.  At one point she had pain in her right shoulder she feels perhaps due to the repetitive activity playing with her grandchildren and had a corticosteroid injection subacromially at the orthopedics which has provided her with excellent relief.    Further historical information and ADL limitations as noted in the multidimensional health assessment questionnaire attached in the EMR.        Following is the excerpt from a recent note:   She's been told that she has pseudogout.  She recalls that it was almost 30 years ago when she had knee pain and swelling and had arthroscopic surgery and was told that she had pseudogout crystals.  Over the subsequent many years she would get pain and ache and various joint areas such as the wrists, knees, and hips.  This she would typically treat with a few days of prednisone.  She will take prednisone for about a week or so.  And these episodes recur approximately at a rate of about once every 2-3 months.  She has noted no swelling or redness or warmth during these episodes it is more of her pain.  She noted that this pattern has been there for the past 3-4 years.  Prior to that for several years and this was not the case.  Her son and she understands has a psoriatic arthritis.  She does not have psoriasis on her side of the family.  There is amitriptyline noted on her list of medications which she understands is also for her pseudogout.  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  She has multiple comorbidities as noted.  This includes coronary artery disease.  She is status post stent placement.  She noted that at its peak the pain can be moderately severe.   ALLERGIES:Review of patient's allergies indicates no known allergies.    PAST  MEDICAL/ACTIVE PROBLEMS/MEDICATION/ FAMILY HISTORY/SOCIAL DATA:  The patient has a family history of  Past Medical History:   Diagnosis Date     Arthralgia      Breast cyst y-10     Carotid artery stenosis      Cholelithiasis      Chondrocalcinosis      Coronary artery disease      Hyperlipidemia      Hypertension      Hypothyroidism      Myocardial infarction      Nocturia      Osteoarthrosis      Osteopenia      Vitamin D deficiency      History   Smoking Status     Former Smoker     Packs/day: 0.50     Years: 20.00     Quit date: 1/1/1985   Smokeless Tobacco     Never Used     Patient Active Problem List   Diagnosis     Urinary Frequency More Than Twice At Night (Nocturia)     Hypertension     Diffuse Joint Pains (Arthralgias)     Cholelithiasis     Hypothyroidism     Acute Myocardial Infarction Of The Inferior Wall     Vitamin D Deficiency     Hyperlipidemia     Coronary Artery Disease     Carotid Artery Stenosis     Osteopenia     Osteoarthrosis Of Multiple Sites     Ganglion cyst of flexor tendon sheath of finger, right     Chondrocalcinosis     Greater trochanteric bursitis of both hips     Current Outpatient Prescriptions   Medication Sig Dispense Refill     acetaminophen (TYLENOL) 500 MG tablet Take 1,000 mg by mouth every 6 (six) hours as needed.       amitriptyline (ELAVIL) 25 MG tablet TAKE ONE TABLET BY MOUTH ONE TIME DAILY AND 2 TABS AT BEDTIME 270 tablet 1     ASPIRIN (ASPIR-81 ORAL) Take 1 tablet by mouth daily.       b complex vitamins tablet Take 1 tablet by mouth daily.       CALCIUM-MAGNESIUM-VITAMIN D2 ORAL Take by mouth daily. Calcium-magnesium-vitamin D 200-100-33.3 MG-MG-unit oral capsule       cholecalciferol, vitamin D3, (VITAMIN D3) 2,000 unit cap Take 1 capsule by mouth daily.       coenzyme Q10 (CO Q-10) 10 mg capsule Take 400 mg by mouth daily.        DOCOSAHEXANOIC ACID/EPA (FISH OIL ORAL) Take 2 tablets by mouth daily.        ESTRACE 0.01 % (0.1 mg/gram) vaginal cream APPLY 1G INTO  "THE VAGINA TWICE WEEKLY 42.5 g 2     GLUC/AMENA-MSM#1/C/KATELYN/MELLY/BOR (OSTEO BI-FLEX TRIPLE STRENGTH ORAL) Take 2 tablets by mouth daily.       levothyroxine (SYNTHROID, LEVOTHROID) 100 MCG tablet TAKE ONE TABLET BY MOUTH EVERY MORNING AT 6AM 90 tablet 2     lisinopril (PRINIVIL,ZESTRIL) 10 MG tablet TAKE ONE TABLET BY MOUTH ONE TIME DAILY 90 tablet 3     metoprolol succinate (TOPROL-XL) 50 MG 24 hr tablet TAKE ONE TABLET BY MOUTH ONE TIME DAILY 90 tablet 2     multivitamin (MULTIVITAMIN) per tablet Take 1 tablet by mouth daily.       omeprazole (PRILOSEC) 20 MG capsule Take 1 capsule (20 mg total) by mouth daily. Prn 90 capsule 3     rosuvastatin (CRESTOR) 40 MG tablet TAKE ONE TABLET BY MOUTH ONE TIME DAILY 90 tablet 3     traMADol (ULTRAM) 50 mg tablet Take 50 mg by mouth every 8 (eight) hours as needed.  2     No current facility-administered medications for this visit.      DETAILED EXAMINATION  05/01/17  :  Vitals:    05/01/17 1307   BP: 126/68   Patient Site: Left Arm   Patient Position: Sitting   Cuff Size: Adult Regular   Pulse: 76   Weight: 157 lb (71.2 kg)   Height: 5' 0.75\" (1.543 m)     Alert oriented. Head including the face is examined for malar rash, heliotropes, scarring, lupus pernio. Eyes examined for redness such as in episcleritis/scleritis, periorbital lesions.   Neck examined  for lymph nodes, range of motion Both upper and lower extremities (all four) examined for swollen, warm &/or  tender joints, range of motion, rash, muscle weakness, edema. The salient normal / abnormal findings are appended.      She has Heberden nodes, she has JLT of the knees.  No appreciable synovitis in any of the palpable appendicular joints.   There are no tophaceous deposits seen.  She is tender in the right and left trochanteric area.   LAB / IMAGING DATA:  ALT   Date Value Ref Range Status   01/03/2017 36 0 - 45 U/L Final   08/29/2016 34 0 - 45 U/L Final   12/11/2015 47 12 - 78 U/L Final     Albumin   Date Value " Ref Range Status   01/03/2017 4.0 3.5 - 5.0 g/dL Final   08/29/2016 4.2 3.5 - 5.0 g/dL Final   12/11/2015 4.2 3.5 - 5.0 g/dL Final     Creatinine   Date Value Ref Range Status   01/03/2017 0.67 0.60 - 1.10 mg/dL Final   08/29/2016 0.75 0.60 - 1.10 mg/dL Final   12/11/2015 0.78 0.60 - 1.10 mg/dL Final     Comment:       New Creatinine method with new reference ranges as of 9/14/15       WBC   Date Value Ref Range Status   12/30/2016 5.0 4.0 - 11.0 thou/uL Final   08/29/2016 4.3 4.0 - 11.0 thou/uL Final   05/19/2015 4.1 4.0 - 11.0 thou/uL Final   02/19/2015 4.2 4.0 - 11.0 thou/uL Final     Hemoglobin   Date Value Ref Range Status   12/30/2016 14.0 12.0 - 16.0 g/dL Final   08/29/2016 13.5 12.0 - 16.0 g/dL Final   05/19/2015 14.0 12.0 - 16.0 g/dL Final     Platelets   Date Value Ref Range Status   12/30/2016 183 140 - 440 thou/uL Final   08/29/2016 204 140 - 440 thou/uL Final   05/19/2015 212 140 - 440 thou/uL Final       Lab Results   Component Value Date    RIGO 0.28 11/09/2012    RF <15 11/09/2012    SEDRATE 8 08/29/2016

## 2021-06-10 NOTE — TELEPHONE ENCOUNTER
Spoke with pt and asked about any lab work she needed/wanted.  Pt knows she's just due for TSH.  Does not need any other labwork.

## 2021-06-10 NOTE — PROGRESS NOTES
[facility] Follow Up Note    Assessment/Plan:  1. Essential hypertension  Stable on current medications.  - HM2(CBC w/o Differential)  - Basic Metabolic Panel    2. Coronary atherosclerosis due to lipid rich plaque  Stable.  No angina or chest pain.  LDL at goal.  She is on an aspirin a day.    3. Stenosis of carotid artery, unspecified laterality  Followed by Dr. Bao Dueñas.  Results of CTA head neck reviewed from November.  Stable carotid stenosis    4. Acquired hypothyroidism  TSH reviewed.  Will continue current medications    5. Polyarthralgia  She is now on Celebrex for polyarthralgia.  Would like to check kidney function and hemogram.  Doing well.  Would like a slightly reduced dose if possible.  Recommendation: We will change from 200 mg daily to 100 twice daily.  Therefore, she is having a good day, she can reduce medication usage  - celecoxib (CELEBREX) 100 MG capsule; Take 1 capsule (100 mg total) by mouth 2 (two) times a day.  Dispense: 180 capsule; Refill: 2      Follow-up in January for physical    Kayley Johnson MD    Chief Complaint:  Chief Complaint   Patient presents with     Follow-up       History of Present Illness:  Xiomy is a 69 y.o. female who is here today for follow-up of her usual medical problems.  Of note, she has no major complaints.  She states she and her  are managing the pandemic well.  They will be doing a road trip to [facility].  They will be visiting her 3-year-old granddaughter.  They are otherwise well.  She denies angina, chest pain or shortness of breath.  She denies any new bowel or bladder issues.    She continues to follow with Dr. Bao Dueñas with regard to her carotid stenosis.  Her CTA is reviewed with her today.  The stenosis is stable.  She continues to manage her cholesterol and blood pressure well.    Health maintenance is reviewed.  Her mammogram was done in July and this is reviewed with her today.  It was normal.    Review  of Systems:  A comprehensive review of systems was performed and was otherwise negative    PFSH:  Social History:  with adult children  Social History     Tobacco Use   Smoking Status Former Smoker     Packs/day: 0.50     Years: 20.00     Pack years: 10.00     Last attempt to quit: 1985     Years since quittin.6   Smokeless Tobacco Never Used       Past History:   Past Medical History:   Diagnosis Date     Arthralgia      Breast cyst y-10     Carotid artery stenosis      Cholelithiasis      Chondrocalcinosis      Coronary artery disease      Hyperlipidemia      Hypertension      Hypothyroidism      Myocardial infarction (H)      Nocturia      Osteoarthrosis      Osteopenia      Palpitations 10/5/2017     Vitamin D deficiency        Current Outpatient Medications   Medication Sig Dispense Refill     acetaminophen (TYLENOL) 500 MG tablet Take 1,000 mg by mouth every 6 (six) hours as needed.       ASPIRIN (ASPIR-81 ORAL) Take 1 tablet by mouth daily.       estradioL (ESTRACE) 0.01 % (0.1 mg/gram) vaginal cream INSERT 1 GRAM INTO THE VAGINA DAILY. ONE GRAM EVERY MON, WED, FRIDAY 42.5 g 3     gabapentin (NEURONTIN) 300 MG capsule TAKE 1 CAPSULE BY MOUTH THREE TIMES A  capsule 1     levothyroxine (SYNTHROID, LEVOTHROID) 100 MCG tablet TAKE 1 TABLET EVERY MORNING AT 6 A.M. 90 tablet 3     lisinopriL (PRINIVIL,ZESTRIL) 10 MG tablet TAKE 1 TABLET BY MOUTH EVERY DAY 90 tablet 3     metoprolol succinate (TOPROL-XL) 50 MG 24 hr tablet TAKE 1 TABLET BY MOUTH EVERY DAY 90 tablet 3     omeprazole (PRILOSEC) 20 MG capsule Take 1 capsule (20 mg total) by mouth daily. Prn 90 capsule 3     rosuvastatin (CRESTOR) 40 MG tablet TAKE 1 TABLET BY MOUTH EVERY DAY 90 tablet 3     traMADoL (ULTRAM) 50 mg tablet TAKE 1 TABLET BY MOUTH EVERY 6 HOURS AS NEEDED FOR PAIN 20 tablet 0     celecoxib (CELEBREX) 100 MG capsule Take 1 capsule (100 mg total) by mouth 2 (two) times a day. 180 capsule 2     No current  "facility-administered medications for this visit.        Family History: No new family history    Physical Exam:  General Appearance:   Appears quite well and in no acute distress  Vitals:    08/14/20 0757   BP: 114/64   Patient Site: Left Arm   Patient Position: Sitting   Cuff Size: Adult Regular   Pulse: 70   SpO2: 97%   Weight: 154 lb (69.9 kg)   Height: 5' 1\" (1.549 m)     Wt Readings from Last 3 Encounters:   08/14/20 154 lb (69.9 kg)   02/05/20 154 lb (69.9 kg)   07/19/19 150 lb 8 oz (68.3 kg)     Body mass index is 29.1 kg/m .    EYES: Eyelids, conjunctiva, and sclera were normal. Pupils were normal. Cornea, iris, and lens were normal bilaterally.  HEAD, EARS, NOSE, MOUTH, AND THROAT: Head and face were normal. Hearing was normal to voice and the ears were normal to external exam. Nose appearance was normal and there was no discharge. Oropharynx was normal.  TMs were normal.  NECK: Neck appearance was normal. There were no neck masses and the thyroid was not enlarged.  RESPIRATORY: Breathing pattern was normal and the chest moved symmetrically.   Lung sounds were equal bilaterally.  CARDIOVASCULAR: Heart rate and rhythm were normal.  S1 and S2 were normal and there were no extra sounds or murmurs. Peripheral pulses in arms and legs were normal.  Jugular venous pressure was normal.  There was no peripheral edema.  GASTROINTESTINAL: The abdomen was normal in contour.  Bowel sounds were present.   Palpation detected no tenderness, mass, or enlarged organs.   MUSCULOSKELETAL: Skeletal configuration was normal and muscle mass was normal for age. Joint appearance was overall normal.  LYMPHATIC: There were no enlarged nodes.  SKIN/HAIR/NAILS: Skin color was normal.  There were no abnormal skin lesions.  Hair and nails were normal.  NEUROLOGIC: The patient was alert and oriented to person, place, time, and circumstance. Speech was normal. Cranial nerves were normal. Motor strength was normal for age. The patient was " normally coordinated.  PSYCHIATRIC:  Mood and affect were normal and the patient had normal recent and remote memory. The patient's judgment and insight were normal.          Data Review:    Analysis and Summary of Old Records (2):       Records Requested (1):       Other History Summarized (from other people in the room) (2):     Radiology Tests Summarized (XRAY/CT/MRI/DXA) (1): Reviewed CT angios    Labs Reviewed (1): Reviewed thyroid labs and ordered new    Medicine Tests Reviewed (EKG/ECHO/COLONOSCOPY/EGD) (1): Viewed health maintenance    Independent Review of EKG or X-RAY (2):

## 2021-06-11 NOTE — PROGRESS NOTES
[facility] Follow Up Note    Assessment/Plan:  1. Diarrhea-(intermittent, episodic loose stools-up to 5-6 per day)  Exam unremarkable.  No pain or blood.  Recommendations: Diagnostics as below.  Non-diagnostic recommendations include the following: Skip all dairy products except a probiotic yogurt such as activity.  Get a probiotic and start with something like Culturelle D.  Refrain from coffee and chocolate which are laxative like.  Remove magnesium from supplements.  Hold fish oil for 1 month.  Consider reinstitution of amitriptyline if all fails as this is sometimes used for control of IBS symptoms.  Follow-up in 1 month    - Urinalysis-UC if Indicated  - Thyroid Stimulating Hormone (TSH)  - Comprehensive Metabolic Panel  - Ambulatory referral for Colonoscopy          Kayley Johnson MD    Chief Complaint:  Chief Complaint   Patient presents with     Diarrhea       History of Present Illness:  Xiomy is a 66 y.o. female who is here today for evaluation of diarrhea or loose stools.  She states her symptoms started approximately 6 weeks ago.  It seemed to have begun after eating caramel popcorn.  She states the following day, she had increased flatulence and about 5-6 stools.  The stools were soft in nature.  There is no associated abdominal pain or blood.  There was no nausea or vomiting.  She states she improved but has since had several episodes of something similar.  She states she will have days that her bowel movements are normal but then will have these episodic events.  She cannot pinpoint any specific precipitating factor.  She has had no recent travel, antibiotic usage or exposure to infectious diseases.  She states she has been on the same medications for some time.  Although the brands of some of her supplements have changed, she has not changed any of her supplements.  She does eat very little dairy.  She has coffee daily.  The only significant changes that she has discontinued her  amitriptyline.  This was used for arthritis pain.  Of note, she does not have a gallbladder.  She denies any jaundice.  Occasionally she has urinary frequency.  Her last colonoscopy was in 2009.    Review of Systems:  A comprehensive review of systems was performed and was otherwise negative    PFSH:  Social History: She is .  She has adult children.  History   Smoking Status     Former Smoker     Packs/day: 0.50     Years: 20.00     Quit date: 1/1/1985   Smokeless Tobacco     Never Used       Past History: Significant for coronary artery disease and severe osteoarthritis  Current Outpatient Prescriptions   Medication Sig Dispense Refill     acetaminophen (TYLENOL) 500 MG tablet Take 1,000 mg by mouth every 6 (six) hours as needed.       ASPIRIN (ASPIR-81 ORAL) Take 1 tablet by mouth daily.       b complex vitamins tablet Take 1 tablet by mouth daily.       CALCIUM-MAGNESIUM-VITAMIN D2 ORAL Take by mouth daily. Calcium-magnesium-vitamin D 200-100-33.3 MG-MG-unit oral capsule       cholecalciferol, vitamin D3, (VITAMIN D3) 2,000 unit cap Take 1 capsule by mouth daily.       coenzyme Q10 (CO Q-10) 10 mg capsule Take 400 mg by mouth daily.        DOCOSAHEXANOIC ACID/EPA (FISH OIL ORAL) Take 2 tablets by mouth daily.        estradiol (ESTRACE) 0.01 % (0.1 mg/gram) vaginal cream APPLY 1G INTO THE VAGINA TWICE WEEKLY 10 g 0     GLUC/AMENA-MSM#1/C/KATELYN/MELLY/BOR (OSTEO BI-FLEX TRIPLE STRENGTH ORAL) Take 2 tablets by mouth daily.       levothyroxine (SYNTHROID, LEVOTHROID) 100 MCG tablet TAKE ONE TABLET BY MOUTH EVERY MORNING AT 6AM 90 tablet 1     lisinopril (PRINIVIL,ZESTRIL) 10 MG tablet TAKE ONE TABLET BY MOUTH ONE TIME DAILY 90 tablet 1     metoprolol succinate (TOPROL-XL) 50 MG 24 hr tablet TAKE ONE TABLET BY MOUTH ONE TIME DAILY 90 tablet 2     multivitamin (MULTIVITAMIN) per tablet Take 1 tablet by mouth daily.       omeprazole (PRILOSEC) 20 MG capsule Take 1 capsule (20 mg total) by mouth daily. Prn 90 capsule  "3     rosuvastatin (CRESTOR) 40 MG tablet TAKE ONE TABLET BY MOUTH ONE TIME DAILY 90 tablet 2     traMADol (ULTRAM) 50 mg tablet Take 1 tablet (50 mg total) by mouth every 8 (eight) hours as needed. 90 tablet 1     No current facility-administered medications for this visit.        Family History: Nothing new    Physical Exam:  General Appearance:   She appears well and in no acute distress.  Her weight is down about 5 pounds from last visit  Vitals:    06/14/17 1306   BP: 132/68   Patient Site: Left Arm   Patient Position: Sitting   Cuff Size: Adult Regular   Pulse: 66   Weight: 152 lb (68.9 kg)   Height: 5' 0.25\" (1.53 m)     Wt Readings from Last 3 Encounters:   06/14/17 152 lb (68.9 kg)   05/01/17 157 lb (71.2 kg)   01/30/17 152 lb 3.2 oz (69 kg)     Body mass index is 29.44 kg/(m^2).    Head neck exam are negative.  Pupils are equal reactive to light.  Ears nose and throat are intact  Lungs are clear to auscultation and percussion  Cardiac exam reveals regular rate and rhythm with no murmurs or gallops  Abdomen is soft and nontender  Extremities are intact    "

## 2021-06-12 NOTE — TELEPHONE ENCOUNTER
Controlled Substance Refill Request  Medication Name:   Requested Prescriptions     Pending Prescriptions Disp Refills     levothyroxine (SYNTHROID, LEVOTHROID) 112 MCG tablet [Pharmacy Med Name: LEVOTHYROXINE 112 MCG TABLET] 90 tablet 3     Sig: TAKE 1 TABLET EVERY MORNING AT 6 A.M.     traMADoL (ULTRAM) 50 mg tablet [Pharmacy Med Name: TRAMADOL HCL 50 MG TABLET] 20 tablet 0     Sig: TAKE 1 TABLET BY MOUTH EVERY 6 HOURS AS NEEDED FOR PAIN     Date Last Fill: 9/11/20  Requested Pharmacy: [facility]  Submit electronically to pharmacy  Controlled Substance Agreement on file:   Encounter-Level CSA Scan Date:    There are no encounter-level csa scan date.        Last office visit:  8/14/20      levothyroxine (SYNTHROID, LEVOTHROID) 112 MCG tablet [[id number]]    Electronically signed by: Ginna Adams RN on 02/18/19 1445 Status: Discontinued   Ordering user: Ginna Adams RN 02/18/19 1445 Ordering provider: Kayley Johnson MD   Authorized by: Kayley Johnson MD   Frequency:  02/18/19 - 02/05/20  Released by: Ginna Adams RN 02/18/19 1445   Discontinued by: Kayley Johnson MD 02/05/20 0805

## 2021-06-13 NOTE — PROGRESS NOTES
ASSESSMENT AND PLAN:  Xiomy Gan 67 y.o. female is a for follow-up of widespread primary osteoarthritis.  She carries a diagnosis, made elsewhere, of pseudogout.  She has not had any acute joint symptoms during the past 12 months.  She is not a candidate to take nonsteroidals in view of coronary artery disease, status post MI.  She has found on tramadol does help her.  She has the option of injecting corticosteroids or Visco supplementation in her knees where she has severe osteoarthritis particularly the patellofemoral space.  Should she have a flareup of what she recognizes as pseudogout I have asked her to return forth with otherwise return for follow-up this time in 6 months.  .    Diagnoses and all orders for this visit:    Pain in joint    Primary osteoarthritis involving multiple joints    Chondrocalcinosis    Coronary atherosclerosis      HISTORY OF PRESENTING ILLNESS:  Xiomy Gan, 67 y.o., female is here for osteoarthritis reported history of pseudogout, chondrocalcinosis, coronary artery disease the background status post MI.  She has the worst of her pains in her knees and hips.  This is with ambulation.  She rated this as moderately severe.  This is worse on the right.  The tramadol does help, she takes 1 or 2 times daily.  She rated her pain level now at 4.0/10. She also notices change of whether has a deleterious effect.   Typically she is not stiff in the morning beyond the 10-15 minutes.  She's been able to continue to all her day-to-day activities without difficulty.  She is a neither a smoker she takes alcohol 2-3 drinks per week.  She likes to exercise regularly including swimming, yoga, and cough.  She has history of hypertension in the background.  She's had blurry vision and dryness of mouth amongst her review of systems.  At one point she had pain in her right shoulder she feels perhaps due to the repetitive activity playing with her grandchildren and had a corticosteroid injection  subacromially at the orthopedics which has provided her with excellent relief.    Further historical information and ADL limitations as noted in the multidimensional health assessment questionnaire attached in the EMR.        Following is the excerpt from a recent note:   She's been told that she has pseudogout.  She recalls that it was almost 30 years ago when she had knee pain and swelling and had arthroscopic surgery and was told that she had pseudogout crystals.  Over the subsequent many years she would get pain and ache and various joint areas such as the wrists, knees, and hips.  This she would typically treat with a few days of prednisone.  She will take prednisone for about a week or so.  And these episodes recur approximately at a rate of about once every 2-3 months.  She has noted no swelling or redness or warmth during these episodes it is more of her pain.  She noted that this pattern has been there for the past 3-4 years.  Prior to that for several years and this was not the case.  Her son and she understands has a psoriatic arthritis.  She does not have psoriasis on her side of the family.  There is amitriptyline noted on her list of medications which she understands is also for her pseudogout.  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  She has multiple comorbidities as noted.  This includes coronary artery disease.  She is status post stent placement.  She noted that at its peak the pain can be moderately severe.   ALLERGIES:Review of patient's allergies indicates no known allergies.    PAST MEDICAL/ACTIVE PROBLEMS/MEDICATION/ FAMILY HISTORY/SOCIAL DATA:  The patient has a family history of  Past Medical History:   Diagnosis Date     Arthralgia      Breast cyst y-10     Carotid artery stenosis      Cholelithiasis      Chondrocalcinosis      Coronary artery disease      Hyperlipidemia      Hypertension      Hypothyroidism      Myocardial infarction      Nocturia      Osteoarthrosis       Osteopenia      Palpitations 10/5/2017     Vitamin D deficiency      History   Smoking Status     Former Smoker     Packs/day: 0.50     Years: 20.00     Quit date: 1/1/1985   Smokeless Tobacco     Never Used     Patient Active Problem List   Diagnosis     Urinary Frequency More Than Twice At Night (Nocturia)     Hypertension     Diffuse Joint Pains (Arthralgias)     Cholelithiasis     Hypothyroidism     Acute Myocardial Infarction Of The Inferior Wall     Vitamin D Deficiency     Hyperlipidemia     Coronary Artery Disease     Carotid Artery Stenosis     Osteopenia     Osteoarthrosis Of Multiple Sites     Ganglion cyst of flexor tendon sheath of finger, right     Chondrocalcinosis     Greater trochanteric bursitis of both hips     Palpitations     Current Outpatient Prescriptions   Medication Sig Dispense Refill     acetaminophen (TYLENOL) 500 MG tablet Take 1,000 mg by mouth every 6 (six) hours as needed.       ASPIRIN (ASPIR-81 ORAL) Take 1 tablet by mouth daily.       b complex vitamins tablet Take 1 tablet by mouth daily.       CALCIUM-MAGNESIUM-VITAMIN D2 ORAL Take by mouth daily. Calcium-magnesium-vitamin D 200-100-33.3 MG-MG-unit oral capsule       cholecalciferol, vitamin D3, (VITAMIN D3) 2,000 unit cap Take 1 capsule by mouth daily.       coenzyme Q10 (CO Q-10) 10 mg capsule Take 400 mg by mouth daily.        DOCOSAHEXANOIC ACID/EPA (FISH OIL ORAL) Take 2 tablets by mouth daily.        ESTRACE 0.01 % (0.1 mg/gram) vaginal cream INSERT 1 GRAM VAGINALLY TWICE WEEKLY 42.5 g 0     GLUC/AMENA-MSM#1/C/KATELYN/MELLY/BOR (OSTEO BI-FLEX TRIPLE STRENGTH ORAL) Take 2 tablets by mouth daily.       levothyroxine (SYNTHROID, LEVOTHROID) 112 MCG tablet TAKE ONE TABLET BY MOUTH EVERY MORNING AT 6AM 90 tablet 1     lisinopril (PRINIVIL,ZESTRIL) 10 MG tablet TAKE 1 TABLET DAILY 90 tablet 1     metoprolol succinate (TOPROL-XL) 50 MG 24 hr tablet TAKE ONE TABLET BY MOUTH ONE TIME DAILY 90 tablet 2     multivitamin (MULTIVITAMIN) per  "tablet Take 1 tablet by mouth daily.       omeprazole (PRILOSEC) 20 MG capsule Take 1 capsule (20 mg total) by mouth daily. Prn 90 capsule 3     rosuvastatin (CRESTOR) 40 MG tablet TAKE ONE TABLET BY MOUTH ONE TIME DAILY 90 tablet 2     traMADol (ULTRAM) 50 mg tablet Take 1 tablet (50 mg total) by mouth every 8 (eight) hours as needed. 180 tablet 0     No current facility-administered medications for this visit.      DETAILED EXAMINATION  10/31/17  :  Vitals:    10/31/17 1126   BP: 124/70   Pulse: 76   Weight: 152 lb 3.2 oz (69 kg)   Height: 5' 0.25\" (1.53 m)     Alert oriented. Head including the face is examined for malar rash, heliotropes, scarring, lupus pernio. Eyes examined for redness such as in episcleritis/scleritis, periorbital lesions.   Neck examined  for , range of motion Both upper and lower extremities (all four) examined for swollen, warm &/or  tender joints, range of motion, rash, muscle weakness, edema. The salient normal / abnormal findings are appended.     She has Heberden nodes, she has JLT of the knees.  No appreciable synovitis in any of the palpable appendicular joints.   There are no tophaceous deposits seen.  She is tender in the right and left trochanteric area.   LAB / IMAGING DATA:  ALT   Date Value Ref Range Status   06/14/2017 31 0 - 45 U/L Final   01/03/2017 36 0 - 45 U/L Final   08/29/2016 34 0 - 45 U/L Final     Albumin   Date Value Ref Range Status   06/14/2017 4.2 3.5 - 5.0 g/dL Final   01/03/2017 4.0 3.5 - 5.0 g/dL Final   08/29/2016 4.2 3.5 - 5.0 g/dL Final     Creatinine   Date Value Ref Range Status   06/14/2017 0.76 0.60 - 1.10 mg/dL Final   01/03/2017 0.67 0.60 - 1.10 mg/dL Final   08/29/2016 0.75 0.60 - 1.10 mg/dL Final       WBC   Date Value Ref Range Status   12/30/2016 5.0 4.0 - 11.0 thou/uL Final   08/29/2016 4.3 4.0 - 11.0 thou/uL Final   05/19/2015 4.1 4.0 - 11.0 thou/uL Final   02/19/2015 4.2 4.0 - 11.0 thou/uL Final     Hemoglobin   Date Value Ref Range Status "   12/30/2016 14.0 12.0 - 16.0 g/dL Final   08/29/2016 13.5 12.0 - 16.0 g/dL Final   05/19/2015 14.0 12.0 - 16.0 g/dL Final     Platelets   Date Value Ref Range Status   12/30/2016 183 140 - 440 thou/uL Final   08/29/2016 204 140 - 440 thou/uL Final   05/19/2015 212 140 - 440 thou/uL Final       Lab Results   Component Value Date    RIGO 0.28 11/09/2012    RF <15 11/09/2012    SEDRATE 8 08/29/2016

## 2021-06-13 NOTE — TELEPHONE ENCOUNTER
Controlled Substance Refill Request  Medication Name:   Requested Prescriptions     Pending Prescriptions Disp Refills     traMADoL (ULTRAM) 50 mg tablet [Pharmacy Med Name: TRAMADOL HCL 50 MG TABLET] 20 tablet 0     Sig: TAKE 1 TABLET BY MOUTH EVERY 6 HOURS AS NEEDED FOR PAIN     Date Last Fill: 10/23/20  [facility]ed [facility]: [facility]  Submit electronically to pharmacy  Controlled Substance Agreement on file:   Encounter-Level CSA Scan Date:    There are no encounter-level csa scan date.        Last office visit:  8/14/20

## 2021-06-13 NOTE — TELEPHONE ENCOUNTER
Pt send Claim [employer] message to scheduling team requesting an appt for fasting labs.  Orders pended.

## 2021-06-14 NOTE — TELEPHONE ENCOUNTER
Refill Approved    Rx renewed per Medication Renewal Policy. Medication was last renewed on 2/5/20.    Ginna Adams, [facility] Triage/Med Refill 1/22/2021     Requested Prescriptions   Pending Prescriptions Disp Refills     levothyroxine (SYNTHROID, LEVOTHROID) 100 MCG tablet [Pharmacy Med Name: LEVOTHYROXINE 100 MCG TABLET] 90 tablet 3     Sig: TAKE 1 TABLET BY MOUTH EVERY MORNING AT 6 A.M.       Thyroid Hormones Protocol Passed - 1/22/2021  2:21 AM        Passed - Provider visit in past 12 months or next 3 months     Last office visit with prescriber/PCP: 8/14/2020 Kayley Johnson MD OR same dept: Visit date not found OR same specialty: 8/14/2020 Kayley Johnson MD  Last physical: 2/5/2020 Last MTM visit: Visit date not found   Next visit within 3 mo: Visit date not found  Next physical within 3 mo: Visit date not found  Prescriber OR PCP: Kayley Johnson MD  Last diagnosis associated with med order: 1. Essential hypertension  - levothyroxine (SYNTHROID, LEVOTHROID) 100 MCG tablet [Pharmacy Med Name: LEVOTHYROXINE 100 MCG TABLET]; TAKE 1 TABLET BY MOUTH EVERY MORNING AT 6 A.M.  Dispense: 90 tablet; Refill: 3    If protocol passes may refill for 12 months if within 3 months of last provider visit (or a total of 15 months).             Passed - TSH on file in past 12 months for patient age 12 & older     TSH   Date Value Ref Range Status   08/07/2020 0.63 0.30 - 5.00 uIU/mL Final

## 2021-06-15 PROBLEM — M70.61 GREATER TROCHANTERIC BURSITIS OF BOTH HIPS: Status: ACTIVE | Noted: 2017-01-30

## 2021-06-15 PROBLEM — M70.62 GREATER TROCHANTERIC BURSITIS OF BOTH HIPS: Status: ACTIVE | Noted: 2017-01-30

## 2021-06-15 NOTE — TELEPHONE ENCOUNTER
Refill Approved    Rx renewed per Medication Renewal Policy. Medication was last renewed on 2/23/20, 3/6/20.    Kartik Huggins, [facility] Triage/Med Refill 2/23/2021     Requested Prescriptions   Pending Prescriptions Disp Refills     metoprolol succinate (TOPROL-XL) 50 MG 24 hr tablet [Pharmacy Med Name: METOPROLOL SUCC ER 50 MG TAB] 90 tablet 3     Sig: TAKE 1 TABLET BY MOUTH EVERY DAY       Beta-Blockers Refill Protocol Passed - 2/23/2021 12:08 AM        Passed - PCP or prescribing provider visit in past 12 months or next 3 months     Last office visit with prescriber/PCP: 8/14/2020 Kayley Johnson MD OR same dept: Visit date not found OR same specialty: 8/14/2020 Kayley Johnson MD  Last physical: 2/5/2020 Last MTM visit: Visit date not found   Next visit within 3 mo: Visit date not found  Next physical within 3 mo: Visit date not found  Prescriber OR PCP: Kayley Johnson MD  Last diagnosis associated with med order: 1. Acquired hypothyroidism  - metoprolol succinate (TOPROL-XL) 50 MG 24 hr tablet [Pharmacy Med Name: METOPROLOL SUCC ER 50 MG TAB]; TAKE 1 TABLET BY MOUTH EVERY DAY  Dispense: 90 tablet; Refill: 3  - lisinopriL (PRINIVIL,ZESTRIL) 10 MG tablet [Pharmacy Med Name: LISINOPRIL 10 MG TABLET]; TAKE 1 TABLET BY MOUTH EVERY DAY  Dispense: 90 tablet; Refill: 3    If protocol passes may refill for 12 months if within 3 months of last provider visit (or a total of 15 months).             Passed - Blood pressure filed in past 12 months     BP Readings from Last 1 Encounters:   08/14/20 114/64                lisinopriL (PRINIVIL,ZESTRIL) 10 MG tablet [Pharmacy Med Name: LISINOPRIL 10 MG TABLET] 90 tablet 3     Sig: TAKE 1 TABLET BY MOUTH EVERY DAY       Ace Inhibitors Refill Protocol Passed - 2/23/2021 12:08 AM        Passed - PCP or prescribing provider visit in past 12 months       Last office visit with prescriber/PCP: 8/14/2020 Kayley Johnson MD OR same dept: Visit date not found OR same  specialty: 8/14/2020 Kayley Johnson MD  Last physical: 2/5/2020 Last MTM visit: Visit date not found   Next visit within 3 mo: Visit date not found  Next physical within 3 mo: Visit date not found  Prescriber OR PCP: Kayley Johnson MD  Last diagnosis associated with med order: 1. Acquired hypothyroidism  - metoprolol succinate (TOPROL-XL) 50 MG 24 hr tablet [Pharmacy Med Name: METOPROLOL SUCC ER 50 MG TAB]; TAKE 1 TABLET BY MOUTH EVERY DAY  Dispense: 90 tablet; Refill: 3  - lisinopriL (PRINIVIL,ZESTRIL) 10 MG tablet [Pharmacy Med Name: LISINOPRIL 10 MG TABLET]; TAKE 1 TABLET BY MOUTH EVERY DAY  Dispense: 90 tablet; Refill: 3    If protocol passes may refill for 12 months if within 3 months of last provider visit (or a total of 15 months).             Passed - Serum Potassium in past 12 months     Lab Results   Component Value Date    Potassium 4.6 02/22/2021             Passed - Blood pressure filed in past 12 months     BP Readings from Last 1 Encounters:   08/14/20 114/64             Passed - Serum Creatinine in past 12 months     Creatinine   Date Value Ref Range Status   02/22/2021 0.69 0.60 - 1.10 mg/dL Final

## 2021-06-15 NOTE — TELEPHONE ENCOUNTER
Refill Approved    Rx renewed per Medication Renewal Policy. Medication was last renewed on 3/6/20, 8/13/20.    Kartik Huggins, [facility] Triage/Med Refill 2/24/2021     Requested Prescriptions   Pending Prescriptions Disp Refills     estradioL (ESTRACE) 0.01 % (0.1 mg/gram) vaginal cream [Pharmacy Med Name: ESTRADIOL 0.01% CREAM] 42.5 g 3     Sig: INSERT 1 GRAM INTO THE VAGINA DAILY. ONE GRAM EVERY MON, WED, FRIDAY       Hormone Replacement Therapy Refill Protocol Passed - 2/24/2021 12:10 PM        Passed - PCP or prescribing provider visit in past 12 months       Last office visit with prescriber/PCP: 8/14/2020 Kayley Johnson MD OR same dept: Visit date not found OR same specialty: 8/14/2020 Kayley Johnson MD  Last physical: 2/5/2020 Last MTM visit: Visit date not found     Next visit within 3 mo: Visit date not found  Next physical within 3 mo: Visit date not found  Prescriber OR PCP: Kayley Johnson MD  Last diagnosis associated with med order: 1. Atrophic vaginitis  - estradioL (ESTRACE) 0.01 % (0.1 mg/gram) vaginal cream [Pharmacy Med Name: ESTRADIOL 0.01% CREAM]; INSERT 1 GRAM INTO THE VAGINA DAILY. ONE GRAM EVERY MON, WED, FRIDAY  Dispense: 42.5 g; Refill: 3    2. Hypercholesterolemia  - rosuvastatin (CRESTOR) 40 MG tablet [Pharmacy Med Name: ROSUVASTATIN CALCIUM 40 MG TAB]; TAKE 1 TABLET BY MOUTH EVERY DAY  Dispense: 90 tablet; Refill: 3    3. Primary osteoarthritis involving multiple joints  - traMADoL (ULTRAM) 50 mg tablet [Pharmacy Med Name: TRAMADOL HCL 50 MG TABLET]; TAKE 1 TABLET BY MOUTH EVERY 6 HOURS AS NEEDED FOR PAIN  Dispense: 20 tablet; Refill: 0     If protocol passes may refill for 3 months.               rosuvastatin (CRESTOR) 40 MG tablet [Pharmacy Med Name: ROSUVASTATIN CALCIUM 40 MG TAB] 90 tablet 3     Sig: TAKE 1 TABLET BY MOUTH EVERY DAY       Statins Refill Protocol (Hmg CoA Reductase Inhibitors) Passed - 2/24/2021 12:10 PM        Passed - PCP or prescribing provider  visit in past 12 months      Last office visit with prescriber/PCP: 8/14/2020 Kayley Johnson MD OR [facility] dept: Visit date not found OR same specialty: 8/14/2020 Kayley Johnson MD  Last physical: 2/5/2020 Last MTM visit: Visit date not found   Next visit within 3 mo: Visit date not found  Next physical within 3 mo: Visit date not found  Prescriber OR PCP: Kayley Johnson MD  Last diagnosis associated with med order: 1. Atrophic vaginitis  - estradioL (ESTRACE) 0.01 % (0.1 mg/gram) vaginal cream [Pharmacy Med Name: ESTRADIOL 0.01% CREAM]; INSERT 1 GRAM INTO THE VAGINA DAILY. ONE GRAM EVERY MON, WED, FRIDAY  Dispense: 42.5 g; Refill: 3    2. Hypercholesterolemia  - rosuvastatin (CRESTOR) 40 MG tablet [Pharmacy Med Name: ROSUVASTATIN CALCIUM 40 MG TAB]; TAKE 1 TABLET BY MOUTH EVERY DAY  Dispense: 90 tablet; Refill: 3    3. Primary osteoarthritis involving multiple joints  - traMADoL (ULTRAM) 50 mg tablet [Pharmacy Med Name: TRAMADOL HCL 50 MG TABLET]; TAKE 1 TABLET BY MOUTH EVERY 6 HOURS AS NEEDED FOR PAIN  Dispense: 20 tablet; Refill: 0    If protocol passes may refill for 12 months if within 3 months of last provider visit (or a total of 15 months).                traMADoL (ULTRAM) 50 mg tablet [Pharmacy Med Name: TRAMADOL HCL 50 MG TABLET] 20 tablet 0     Sig: TAKE 1 TABLET BY MOUTH EVERY 6 HOURS AS NEEDED FOR PAIN       Controlled Substances Refill Protocol Failed - 2/24/2021 12:10 PM        Failed - Route all Controlled Substance Requests to Provider        Passed - Patient has controlled substance agreement in past 12 months     Encounter-Level CSA Scan Date:    There are no encounter-level csa scan date.               Passed - Visit with PCP or prescribing provider visit in past 12 months      Last office visit with prescriber/PCP: 8/14/2020 Kayley Johnson MD OR [facility] dept: Visit date not found OR same specialty: 8/14/2020 Kayley Johnson MD Last physical: 2/5/2020 Last MTM visit: Visit date  not found    Next visit within 3 mo: Visit date not found  Next physical within 3 mo: Visit date not found  Prescriber OR PCP: Kayley Johnson MD  Last diagnosis associated with med order: 1. Atrophic vaginitis  - estradioL (ESTRACE) 0.01 % (0.1 mg/gram) vaginal cream [Pharmacy Med Name: ESTRADIOL 0.01% CREAM]; INSERT 1 GRAM INTO THE VAGINA DAILY. ONE GRAM EVERY MON, WED, FRIDAY  Dispense: 42.5 g; Refill: 3    2. Hypercholesterolemia  - rosuvastatin (CRESTOR) 40 MG tablet [Pharmacy Med Name: ROSUVASTATIN CALCIUM 40 MG TAB]; TAKE 1 TABLET BY MOUTH EVERY DAY  Dispense: 90 tablet; Refill: 3    3. Primary osteoarthritis involving multiple joints  - traMADoL (ULTRAM) 50 mg tablet [Pharmacy Med Name: TRAMADOL HCL 50 MG TABLET]; TAKE 1 TABLET BY MOUTH EVERY 6 HOURS AS NEEDED FOR PAIN  Dispense: 20 tablet; Refill: 0

## 2021-06-15 NOTE — PROGRESS NOTES
Assessment and Plan:   All forms completed    1. Routine health maintenance  Recommend 3D mammography due to the presence of dense breasts.  Colon cancer screening done in 2017.  Bone densitometry up-to-date.  Ophthalmologic, dental and dermatologic care are up-to-date.  Lifestyle reviewed: She continues to exercise regularly, eat healthy meals and enjoys travel.    2.  Multi-joint DJD  Managed well with occasional tramadol and Tylenol.    3. Hypertension  Under good control    4. Coronary Artery Disease  Stable.  Recent complaints of palpitations in the evening.  Holter monitor reviewed and unremarkable.  Recommendation: We will proceed with echocardiogram to evaluate LV in the context of PVCs.  Have asked her to take her metoprolol mid morning instead of at 5 AM.  Continue to monitor symptoms.  Avoid evening alcohol and caffeine.  - Echo Complete; Future    5. Osteopenia  Continue with weightbearing and balance exercise.  Have also discussed the importance of calcium and vitamin D supplementation    6. Acquired hypothyroidism  Labs evaluated and are optimal    7. Hypercholesterolemia  Cholesterol is at goal on current medication in the setting of coronary artery disease    8. Carotid artery disease, unspecified laterality  He is followed by Dr. Dueñas regularly.  She had imaging done recently of her carotid arteries.    9. Palpitations  As above.  Holter monitor shows PVCs but no dangerous ectopy.  Will check echocardiogram.  Encourage hydration, avoidance of wine and alcohol in the evening, avoidance of stimulants such as caffeine  - Echo Complete; Future     The patient's current medical problems were reviewed.      The following health maintenance schedule was reviewed with the patient and provided in printed form in the after visit summary:   Health Maintenance   Topic Date Due     INFLUENZA VACCINE RULE BASED (1) 08/01/2017     MAMMOGRAM  08/29/2018     DXA SCAN  01/03/2019     FALL RISK ASSESSMENT   01/24/2019     TD 18+ HE  07/15/2021     ADVANCE DIRECTIVES DISCUSSED WITH PATIENT  12/30/2021     COLONOSCOPY  12/15/2027     PNEUMOCOCCAL POLYSACCHARIDE VACCINE AGE 65 AND OVER  Completed     PNEUMOCOCCAL CONJUGATE VACCINE FOR ADULTS (PCV13 OR PREVNAR)  Completed     ZOSTER VACCINE  Completed        Subjective:   Chief Complaint: Xiomy Gan is an 67 y.o. female here for an Annual Wellness visit.   HPI: Hue is a delightful 67-year-old female who is here today for preventative health examination.  Of note, in general, she enjoys good health.  She did have an episode of palpitations.  She does note occasional irregular heartbeats in the evening.  Holter monitor was performed and reveals occasional PVCs.  She states that she is compliant with her metoprolol and takes this at 5 AM.  All of her other medications are as noted.  She denies any chest pain, shortness of breath or diaphoresis with this.  She is otherwise well.    Health maintenance is reviewed and noted in the electronic health record.    Lifestyle is reviewed.  She continues to eat very healthfully.  She exercises regularly.  She and her  enjoy travel.    Review of Systems: Aside from the palpitations, The rest of the review of systems are negative for all systems.    Patient Care Team:  Kayley Johnson MD as PCP - General  Kaley Hawkins MD as Physician (Cardiology)  Lindy Fragoso MD (Dermatology)  SERAFIN Sparrow as Physician (Rheumatology)     Patient Active Problem List   Diagnosis     Urinary Frequency More Than Twice At Night (Nocturia)     Hypertension     Diffuse Joint Pains (Arthralgias)     Cholelithiasis     Hypothyroidism     Acute Myocardial Infarction Of The Inferior Wall     Vitamin D Deficiency     Hypercholesterolemia     Coronary Artery Disease     Carotid artery disease     Osteopenia     Osteoarthrosis Of Multiple Sites     Ganglion cyst of flexor tendon sheath of finger, right     Chondrocalcinosis     Greater  trochanteric bursitis of both hips     Palpitations     Past Medical History:   Diagnosis Date     Arthralgia      Breast cyst y-10     Carotid artery stenosis      Cholelithiasis      Chondrocalcinosis      Coronary artery disease      Hyperlipidemia      Hypertension      Hypothyroidism      Myocardial infarction      Nocturia      Osteoarthrosis      Osteopenia      Palpitations 10/5/2017     Vitamin D deficiency       Past Surgical History:   Procedure Laterality Date     BREAST CYST ASPIRATION Left y-10     LAPAROSCOPIC CHOLECYSTECTOMY N/A 2015    Procedure: LAPAROSCOPIC CHOLECYSTECTOMY ;  Surgeon: Bao Hanley MD;  Location: [facility];  Service:      TX  DELIVERY ONLY      Description:  Section;  Recorded: 2012;  Comments: three  Annotations: Had 3 C-sections     TX KNEE SCOPE,DIAGNOSTIC      Description: Arthroscopy Knee Right;  Recorded: 2009;     TUBAL LIGATION        Family History   Problem Relation Age of Onset     Diabetes Mother      Parkinsonism Father      Diabetes Sister      Heart disease Sister      Heart disease Brother      Arthritis Son       Social History     Social History     Marital status:      Spouse name: N/A     Number of children: N/A     Years of education: N/A     Occupational History     Not on file.     Social History Main Topics     Smoking status: Former Smoker     Packs/day: 0.50     Years: 20.00     Quit date: 1985     Smokeless tobacco: Never Used     Alcohol use 1.2 - 1.8 oz/week     2 - 3 Cans of beer per week     Drug use: No     Sexual activity: Not on file     Other Topics Concern     Not on file     Social History Narrative      Current Outpatient Prescriptions   Medication Sig Dispense Refill     acetaminophen (TYLENOL) 500 MG tablet Take 1,000 mg by mouth every 6 (six) hours as needed.       ASPIRIN (ASPIR-81 ORAL) Take 1 tablet by mouth daily.       b complex vitamins tablet Take 1 tablet by mouth daily.        "CALCIUM-MAGNESIUM-VITAMIN D2 ORAL Take by mouth daily. Calcium-magnesium-vitamin D 200-100-33.3 MG-MG-unit oral capsule       cholecalciferol, vitamin D3, (VITAMIN D3) 2,000 unit cap Take 1 capsule by mouth daily.       coenzyme Q10 (CO Q-10) 10 mg capsule Take 400 mg by mouth daily.        DOCOSAHEXANOIC ACID/EPA (FISH OIL ORAL) Take 2 tablets by mouth daily.        GLUC/AMENA-MSM#1/C/KATELYN/MELLY/BOR (OSTEO BI-FLEX TRIPLE STRENGTH ORAL) Take 2 tablets by mouth daily.       multivitamin (MULTIVITAMIN) per tablet Take 1 tablet by mouth daily.       traMADol (ULTRAM) 50 mg tablet Take 1 tablet (50 mg total) by mouth every 8 (eight) hours as needed. 180 tablet 0     estradiol (ESTRACE) 0.01 % (0.1 mg/gram) vaginal cream INSERT 1 GRAM VAGINALLY TWICE WEEKLY 42.5 g 3     levothyroxine (SYNTHROID, LEVOTHROID) 112 MCG tablet TAKE 1 TABLET EVERY MORNING AT 6 A.M. 90 tablet 3     lisinopril (PRINIVIL,ZESTRIL) 10 MG tablet TAKE 1 TABLET DAILY 90 tablet 3     metoprolol succinate (TOPROL-XL) 50 MG 24 hr tablet TAKE ONE TABLET BY MOUTH ONE TIME DAILY 90 tablet 3     omeprazole (PRILOSEC) 20 MG capsule Take 1 capsule (20 mg total) by mouth daily. Prn 90 capsule 3     rosuvastatin (CRESTOR) 40 MG tablet TAKE ONE TABLET BY MOUTH ONE TIME DAILY 90 tablet 3     No current facility-administered medications for this visit.       Objective:   Vital Signs:   Visit Vitals     /72 (Patient Site: Left Arm, Patient Position: Sitting, Cuff Size: Adult Regular)     Pulse 72     Ht 5' 0.5\" (1.537 m)     Wt 151 lb (68.5 kg)     LMP 06/24/1999     BMI 29 kg/m2        VisionScreening:  No exam data present     PHYSICAL EXAM  EYES: Eyelids, conjunctiva, and sclera were normal. Pupils were normal. Cornea, iris, and lens were normal bilaterally.  HEAD, EARS, NOSE, MOUTH, AND THROAT: Head and face were normal. Hearing was normal to voice and the ears were normal to external exam. Nose appearance was normal and there was no discharge. Oropharynx was " normal.  TMs were normal.  NECK: Neck appearance was normal. There were no neck masses and the thyroid was not enlarged.  RESPIRATORY: Breathing pattern was normal and the chest moved symmetrically.   Lung sounds were equal bilaterally.  CARDIOVASCULAR: Heart rate and rhythm were normal.  S1 and S2 were normal and there were no extra sounds or murmurs. Peripheral pulses in arms and legs were normal.  Jugular venous pressure was normal.  There was no peripheral edema.  GASTROINTESTINAL: The abdomen was normal in contour.  Bowel sounds were present.   Palpation detected no tenderness, mass, or enlarged organs.   MUSCULOSKELETAL: Skeletal configuration was normal and muscle mass was normal for age. Joint appearance was overall normal.  LYMPHATIC: There were no enlarged nodes.  SKIN/HAIR/NAILS: Skin color was normal.  There were no abnormal skin lesions.  Hair and nails were normal.  NEUROLOGIC: The patient was alert and oriented to person, place, time, and circumstance. Speech was normal. Cranial nerves were normal. Motor strength was normal for age. The patient was normally coordinated.  PSYCHIATRIC:  Mood and affect were normal and the patient had normal recent and remote memory. The patient's judgment and insight were normal.  BREASTS: Examined bilaterally.  No masses, nipple discharge or axillary adenopathy        Assessment Results 1/24/2018   Activities of Daily Living No help needed   Instrumental Activities of Daily Living No help needed   Get Up and Go Score Less than 12 seconds   Mini Cog Total Score 5   Some recent data might be hidden     A Mini-Cog score of 0-2 suggests the possibility of dementia, score of 3-5 suggests no dementia    Identified Health Risks:     The patient reports that she does not have all recommended working emergency equipment available. She was provided with information about emergency preparedness, including smoke detectors.  The patient was provided with written information  regarding signs of hearing loss.  Patient's advanced directive was discussed and I am comfortable with the patient's wishes.

## 2021-06-15 NOTE — PROGRESS NOTES
Assessment and Plan:   Annual wellness forms reviewed-no new needs identified  Patient has been advised of split billing requirements and indicates understanding: No  1. Encounter for preventive care  She is up-to-date on ophthalmologic, dental and Derm care.  Colon cancer screening is up-to-date and due for 10 years.  She is due for tetanus shot, however, just received coronavirus vaccine.  Future order will be placed.  She needs an update on bone densitometry.  She is exercising regularly doing swimming twice a week and walking daily.  She is up a couple of pounds-recommend 5 pounds of weight loss.    2. Primary osteoarthritis involving multiple joints  As Tylenol on a regular basis with tramadol sparingly and as needed.  CSA signed  - traMADoL (ULTRAM) 50 mg tablet; Take 1 tablet (50 mg total) by mouth every 6 (six) hours as needed for pain.  Dispense: 20 tablet; Refill: 1    3. Essential hypertension  Stable on current medications    4. Acquired hypothyroidism  Abs reviewed and stable    5. Stenosis of carotid artery, unspecified laterality  Reviewed CTA of the neck-stable    6. Coronary atherosclerosis due to lipid rich plaque  Stable    7. Vitamin D deficiency  Lab stable on 5000 IUs of vitamin D3    8. Hypercholesterolemia  Goal    9. Gastroesophageal reflux disease with esophagitis without hemorrhage  Pepcid as needed    10. Dyspnea on exertion  Likely secondary to deconditioning and weight gain.  Nonetheless has mild mitral regurg and frequent PVCs.  Will update echocardiogram not emergently  - Echo Complete; Future    11. Nonrheumatic mitral valve regurgitation  As above  - Echo Complete; Future    12. Menopause  Low bone mass with moderate fracture risk  - DXA Bone Density Scan; Future     The patient's current medical problems were reviewed.      The following health maintenance schedule was reviewed with the patient and provided in printed form in the after visit summary:   Health Maintenance   Topic  Date Due     TD 18+ HE  07/15/2021     MEDICARE ANNUAL WELLNESS VISIT  02/26/2022     FALL RISK ASSESSMENT  02/26/2022     MAMMOGRAM  07/24/2022     ADVANCE CARE PLANNING  02/05/2025     LIPID  02/22/2026     COLORECTAL CANCER SCREENING  12/15/2027     DEXA SCAN  01/28/2034     HEPATITIS C SCREENING  Completed     Pneumococcal Vaccine: 65+ Years  Completed     INFLUENZA VACCINE RULE BASED  Completed     ZOSTER VACCINES  Completed     COVID-19 Vaccine  Completed     Pneumococcal Vaccine: Pediatrics (0 to 5 Years) and At-Risk Patients (6 to 64 Years)  Aged Out     HEPATITIS B VACCINES  Aged Out        Subjective:   Chief Complaint: Xiomy Gan is an 70 y.o. female here for an Annual Wellness visit.   HPI: Hue is a delightful 70-year-old female who is here today for a wellness visit.  Overall, she is doing quite fine.  She has no chief complaints.  She states that she continues to exercise.  She does lament that she is a few pounds overweight due to ingestion of candy bars.  She states that she is very vigilant with the coronavirus situation.    Health maintenance is reviewed and updated in the chart.    Review of Systems: Describes mild dyspnea on exertion and occasional urinary leakage please see above.  The rest of the review of systems are negative for all systems.    Patient Care Team:  Kayley Johnson MD as PCP - Kaley Horton MD as Physician (Cardiology)  Lindy Fragoso MD (Dermatology)  Zack Coello MBBS as Physician (Rheumatology)  Kayley Johnson MD as Assigned PCP     Patient Active Problem List   Diagnosis     Essential hypertension     Diffuse Joint Pains (Arthralgias)     Cholelithiasis     Hypothyroidism     Vitamin D Deficiency     Hypercholesterolemia     Coronary Artery Disease     Carotid artery disease (H)     Osteopenia     Osteoarthrosis Of Multiple Sites     Ganglion cyst of flexor tendon sheath of finger, right     Chondrocalcinosis     Greater trochanteric  bursitis of both hips     Palpitations     Past Medical History:   Diagnosis Date     Arthralgia      Breast cyst y-10     Carotid artery stenosis      Cholelithiasis      Chondrocalcinosis      Coronary artery disease      Hyperlipidemia      Hypertension      Hypothyroidism      Myocardial infarction (H)      Nocturia      Osteoarthrosis      Osteopenia      Palpitations 10/5/2017     Vitamin D deficiency       Past Surgical History:   Procedure Laterality Date     BREAST CYST ASPIRATION Left     long time ago     LAPAROSCOPIC CHOLECYSTECTOMY N/A 2015    Procedure: LAPAROSCOPIC CHOLECYSTECTOMY ;  Surgeon: Bao Hanley MD;  Location: [facility];  Service:      OH  DELIVERY ONLY      Description:  Section;  Recorded: 2012;  Comments: three  Annotations: Had 3 C-sections     OH KNEE SCOPE,DIAGNOSTIC      Description: Arthroscopy Knee Right;  Recorded: 2009;     TUBAL LIGATION        Family History   Problem Relation Age of Onset     Diabetes Mother      Parkinsonism Father      Diabetes Sister      Heart disease Sister      Heart disease Brother      Arthritis Son      Psoriasis Son      Rheum arthritis Son      Breast cancer Neg Hx       Social History     Socioeconomic History     Marital status:      Spouse name: Not on file     Number of children: Not on file     Years of education: Not on file     Highest education level: Not on file   Occupational History     Not on file   Social Needs     Financial resource strain: Not on file     Food insecurity     Worry: Not on file     Inability: Not on file     Transportation needs     Medical: Not on file     Non-medical: Not on file   Tobacco Use     Smoking status: Former Smoker     Packs/day: 0.50     Years: 20.00     Pack years: 10.00     Quit date: 1985     Years since quittin.1     Smokeless tobacco: Never Used   Substance and Sexual Activity     Alcohol use: Yes     Alcohol/week: 2.0 - 3.0 standard  drinks     Types: 2 - 3 Cans of beer per week     Drug use: No     Sexual activity: Not on file   Lifestyle     Physical activity     Days per week: Not on file     Minutes per session: Not on file     Stress: Not on file   Relationships     Social connections     Talks on phone: Not on file     Gets together: Not on file     Attends Cheondoism service: Not on file     Active member of club or organization: Not on file     Attends meetings of clubs or organizations: Not on file     Relationship status: Not on file     Intimate partner violence     Fear of current or ex partner: Not on file     Emotionally abused: Not on file     Physically abused: Not on file     Forced sexual activity: Not on file   Other Topics Concern     Not on file   Social History Narrative    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  She is .      Current Outpatient Medications   Medication Sig Dispense Refill     acetaminophen (TYLENOL) 500 MG tablet Take 1,000 mg by mouth every 6 (six) hours as needed.       ASPIRIN (ASPIR-81 ORAL) Take 1 tablet by mouth daily.       celecoxib (CELEBREX) 100 MG capsule Take 1 capsule (100 mg total) by mouth 2 (two) times a day. 180 capsule 2     estradioL (ESTRACE) 0.01 % (0.1 mg/gram) vaginal cream INSERT 1 GRAM INTO THE VAGINA DAILY. ONE GRAM EVERY MON, WED, FRIDAY 42.5 g 0     famotidine (PEPCID) 20 MG tablet Take 20 mg by mouth 2 (two) times a day as needed for heartburn.       gabapentin (NEURONTIN) 300 MG capsule TAKE 1 CAPSULE BY MOUTH THREE TIMES A  capsule 1     levothyroxine (SYNTHROID, LEVOTHROID) 100 MCG tablet TAKE 1 TABLET BY MOUTH EVERY MORNING AT 6 A.M. 90 tablet 1     lisinopriL (PRINIVIL,ZESTRIL) 10 MG tablet TAKE 1 TABLET BY MOUTH EVERY DAY 90 tablet 1     metoprolol succinate (TOPROL-XL) 50 MG 24 hr tablet TAKE 1 TABLET BY MOUTH EVERY DAY 90 tablet 1     rosuvastatin (CRESTOR) 40 MG tablet TAKE 1 TABLET BY MOUTH EVERY DAY 90 tablet 1     traMADoL (ULTRAM) 50 mg tablet Take 1  "tablet (50 mg total) by mouth every 6 (six) hours as needed for pain. 20 tablet 1     No current facility-administered medications for this visit.       Objective:   Vital Signs:   Visit Vitals  /68 (Patient Site: Left Arm, Patient Position: Sitting, Cuff Size: Adult Large)   Pulse 66   Resp 18   Ht 5' 1\" (1.549 m)   Wt 156 lb 9 oz (71 kg)   LMP 06/24/1999   SpO2 97%   BMI 29.58 kg/m           VisionScreening:  No exam data present     PHYSICAL EXAM  EYES: Eyelids, conjunctiva, and sclera were normal. Pupils were normal. Cornea, iris, and lens were normal bilaterally.  HEAD, EARS, NOSE, MOUTH, AND THROAT: Head and face were normal. Hearing was normal to voice and the ears were normal to external exam. Nose appearance was normal. TMs were normal.  NECK: Neck appearance was normal. There were no neck masses and the thyroid was not enlarged.  RESPIRATORY: Breathing pattern was normal and the chest moved symmetrically.   Lung sounds were equal bilaterally.  CARDIOVASCULAR: Heart rate and rhythm were normal.  S1 and S2 were normal and there were no extra sounds or murmurs. Peripheral pulses in arms and legs were normal.  Jugular venous pressure was normal.  There was no peripheral edema.  GASTROINTESTINAL: The abdomen was normal in contour.  Bowel sounds were present.   Palpation detected no tenderness, mass, or enlarged organs.   MUSCULOSKELETAL: Skeletal configuration was normal and muscle mass was normal for age. Joint appearance was overall normal.  LYMPHATIC: There were no enlarged nodes.  SKIN/HAIR/NAILS: Skin color was normal.  There were no abnormal skin lesions.  Hair and nails were normal.  NEUROLOGIC: The patient was alert and oriented to person, place, time, and circumstance. Speech was normal. Cranial nerves were normal. Motor strength was normal for age. The patient was normally coordinated.  PSYCHIATRIC:  Mood and affect were normal and the patient had normal recent and remote memory. The patient's " judgment and insight were normal.  BREASTS: Examined bilaterally and within normal limits        Assessment Results 2/26/2021   Activities of Daily Living No help needed   Instrumental Activities of Daily Living No help needed   Get Up and Go Score Less than 12 seconds   Mini Cog Total Score 5   Some recent data might be hidden     A Mini-Cog score of 0-2 suggests the possibility of dementia, score of 3-5 suggests no dementia    Identified Health Risks:     The patient was provided with written information regarding signs of hearing loss.  Information on urinary incontinence and treatment options given to patient.  Patient's advanced directive was discussed and I am comfortable with the patient's wishes.

## 2021-06-15 NOTE — TELEPHONE ENCOUNTER
Controlled Substance Refill Request  Medication Name:   Requested Prescriptions     Pending Prescriptions Disp Refills     traMADoL (ULTRAM) 50 mg tablet [Pharmacy Med Name: TRAMADOL HCL 50 MG TABLET] 20 tablet 0     Sig: TAKE 1 TABLET BY MOUTH EVERY 6 HOURS AS NEEDED FOR PAIN     Signed Prescriptions Disp Refills     estradioL (ESTRACE) 0.01 % (0.1 mg/gram) vaginal cream 42.5 g 0     Sig: INSERT 1 GRAM INTO THE VAGINA DAILY. ONE GRAM EVERY MON, WED, FRIDAY     Authorizing Provider: FLOR CAMPA     Ordering User: MICHEL LAURA     rosuvastatin (CRESTOR) 40 MG tablet 90 tablet 1     Sig: TAKE 1 TABLET BY MOUTH EVERY DAY     Authorizing Provider: FLOR CAMPA     Ordering User: MICHEL LAURA     Date Last Fill: 11/25/20  Requested Pharmacy: [facility]  Submit electronically to pharmacy  Controlled Substance Agreement on file:   Encounter-Level CSA Scan Date:    There are no encounter-level csa scan date.        Last office visit:  8/14/20

## 2021-06-16 PROBLEM — R00.2 PALPITATIONS: Status: ACTIVE | Noted: 2017-10-05

## 2021-06-16 NOTE — TELEPHONE ENCOUNTER
Telephone Encounter by Alda Bloom LPN at 1/3/2019  9:16 AM     Author: Alda Bloom LPN Service: -- Author Type: Licensed Nurse    Filed: 1/3/2019  9:20 AM Encounter Date: 1/2/2019 Status: Signed    : Alda Bloom LPN (Licensed Nurse)       Medication: Tramadol   Last Date Filled 10/26/18   pulled: YES    Only PCP Prescribing? : YES  Taken as prescribed from physician notes? YES OV 10/29/18  PLAN:  Patient Instructions   Cracked or ruptured disc Lumbar 2, maybe 1, to the left side     Consider diverticulitis      Prednisone 20 mgs once a day for a week for disc inflammation     No NSAIDS due to heart history     Tylenol ok     Gabapentin 300 mgs 3 times a day to numb and insulate the nerves.  Side effect of Gabapentin too sleepy     Tramadol as needed     Symptoms persist, consider MRI of the spine, or a referral to the spine clinic, or physical therapy     mychart me over the next few days        No orders of the defined types were placed in this encounter.      CSA in last year: NO  Random Utox in last year: NO  (if any of the above answer NO - schedule with PCP)     Opioids + benzodiazepines? NO  (if the above answer YES - schedule with PCP every 6 months)     All responses suggest: Scheduling with PCP for further intervention Pt has appt with PCP scheduled for 1/22/19

## 2021-06-16 NOTE — TELEPHONE ENCOUNTER
Telephone Encounter by Samira Hernandez CMA at 3/27/2020 10:07 AM     Author: Samira Hernandez CMA Service: -- Author Type: Certified Medical Assistant    Filed: 3/27/2020 10:09 AM Encounter Date: 3/26/2020 Status: Signed    : Samira Hernandez CMA (Certified Medical Assistant)       Medication: Tramadol   Last Date Filled 2/10/2020   pulled: YES    Only PCP Prescribing? : YES  Taken as prescribed from physician notes? YES    CSA in last year: NO  Random Utox in last year: NO  (if any of the above answer NO - schedule with PCP)     Opioids + benzodiazepines? NO  (if the above answer YES - schedule with PCP every 6 months)     Is patient on the Executive Review Team? NO      All responses suggest: PCP TO ADVISE    Return in about 1 month (around 3/5/2020).

## 2021-06-16 NOTE — TELEPHONE ENCOUNTER
Telephone Encounter by Alda Bloom LPN at 2/10/2020  2:18 PM     Author: Alda Bloom LPN Service: -- Author Type: Licensed Nurse    Filed: 2/10/2020  2:22 PM Encounter Date: 2/7/2020 Status: Signed    : Alda Bloom LPN (Licensed Nurse)       Medication: Tramadol  Last Date Filled 11/25/19   pulled: YES         Only PCP Prescribing? : YES  Taken as prescribed from physician notes? YES OV 1/22/19    . Other chronic pain  Mostly related to hip and knee pain.  She is on Tylenol, gabapentin as needed and tramadol as needed.  Recommendation: We will add low-dose Celebrex.  Would like her to minimize tramadol usage.  Continue with Tylenol and gabapentin at bedtime    CSA in last year: NO, CSA on file for Non Opioid, not for Opioid  Random Utox in last year: NO  (if any of the above answer NO - schedule with PCP)     Opioids + benzodiazepines? NO  (if the above answer YES - schedule with PCP every 6 months)     Is patient on the Executive Review Team? no      All responses suggest: Routing to covering provider to advise

## 2021-06-16 NOTE — TELEPHONE ENCOUNTER
Telephone Encounter by Samria Hernandez CMA at 5/13/2019  1:57 PM     Author: Samira Hernandez CMA Service: -- Author Type: Certified Medical Assistant    Filed: 5/13/2019  2:00 PM Encounter Date: 5/12/2019 Status: Signed    : Samira Hernandez CMA (Certified Medical Assistant)       Medication: Tramadol  Last Date Filled 1/3/19   pulled: YES    Only PCP Prescribing? : YES  Taken as prescribed from physician notes? YES    CSA in last year: NO  Random Utox in last year: NO  (if any of the above answer NO - schedule with PCP)     Opioids + benzodiazepines? NO  (if the above answer YES - schedule with PCP every 6 months)     Is patient on the Executive Review Team? NO      All responses suggest: Scheduling with PCP for further intervention     Pt is scheduled with PCP 7/19/19

## 2021-06-16 NOTE — TELEPHONE ENCOUNTER
Telephone Encounter by Samira Hernandez CMA at 11/25/2019 11:42 AM     Author: Samira Hernandez CMA Service: -- Author Type: Certified Medical Assistant    Filed: 11/25/2019 11:45 AM Encounter Date: 11/25/2019 Status: Signed    : Samira Hernandez CMA (Certified Medical Assistant)       Medication: Tramadol   Last Date Filled 10/14/19   pulled: YES    Only PCP Prescribing? : YES  Taken as prescribed from physician notes? YES    CSA in last year: NO  Random Utox in last year: NO  (if any of the above answer NO - schedule with PCP)     Opioids + benzodiazepines? NO  (if the above answer YES - schedule with PCP every 6 months)     Is patient on the Executive Review Team? NO      All responses suggest: Scheduling with PCP for further intervention    Return in about 6 months (around 1/19/2020), or wellness, for Annual physical.

## 2021-06-17 NOTE — TELEPHONE ENCOUNTER
Telephone Encounter by Maria Eugenia Robb CMA at 5/5/2020  2:17 PM     Author: Maria Eugenia Robb CMA Service: -- Author Type: Certified Medical Assistant    Filed: 5/5/2020  2:20 PM Encounter Date: 5/4/2020 Status: Signed    : Maria Eugenia Robb CMA (Certified Medical Assistant)

## 2021-06-17 NOTE — TELEPHONE ENCOUNTER
Refill Approved    Rx renewed per Medication Renewal Policy. Medication was last renewed on 2/24/21.    Kartik Huggins, [facility] Triage/Med Refill 5/7/2021     Requested Prescriptions   Refused Prescriptions Disp Refills     estradioL (ESTRACE) 0.01 % (0.1 mg/gram) vaginal cream [Pharmacy Med Name: ESTRADIOL 0.01% CREAM] 42.5 g 0     Sig: INSERT 1 GRAM INTO THE VAGINA DAILY. ONE GRAM EVERY MON, WED, FRIDAY       Hormone Replacement Therapy Refill Protocol Passed - 5/6/2021  4:16 PM        Passed - PCP or prescribing provider visit in past 12 months       Last office visit with prescriber/PCP: 8/14/2020 Kayley Johnson MD OR same dept: Visit date not found OR same specialty: 8/14/2020 Kayley Johnson MD  Last physical: 2/26/2021 Last MTM visit: Visit date not found     Next visit within 3 mo: Visit date not found  Next physical within 3 mo: Visit date not found  Prescriber OR PCP: Kayley Johnson MD  Last diagnosis associated with med order: 1. Atrophic vaginitis  - estradioL (ESTRACE) 0.01 % (0.1 mg/gram) vaginal cream [Pharmacy Med Name: ESTRADIOL 0.01% CREAM]; INSERT 1 GRAM INTO THE VAGINA DAILY. ONE GRAM EVERY MON, WED, FRIDAY  Dispense: 42.5 g; Refill: 0     If protocol passes may refill for 3 months.

## 2021-06-17 NOTE — TELEPHONE ENCOUNTER
Telephone Encounter by Samira Hernandez CMA at 11/25/2020  2:56 PM     Author: Samira Hernandez CMA Service: -- Author Type: Certified Medical Assistant    Filed: 11/25/2020  2:58 PM Encounter Date: 11/25/2020 Status: Signed    : Samira Hernandez CMA (Certified Medical Assistant)       Medication: Tramadol   Last Date Filled 10/23/2020   pulled: YES    Only PCP Prescribing? : YES  Taken as prescribed from physician notes? YES    CSA in last year: NO  Random Utox in last year: NO  (if any of the above answer NO - schedule with PCP)     Opioids + benzodiazepines? NO  (if the above answer YES - schedule with PCP every 6 months)     Is patient on the Executive Review Team? NO      All responses suggest: Refilling prescription

## 2021-06-17 NOTE — TELEPHONE ENCOUNTER
Telephone Encounter by Alda Bloom LPN at 8/13/2020  2:11 PM     Author: Alda Bloom LPN Service: -- Author Type: Licensed Nurse    Filed: 8/13/2020  2:14 PM Encounter Date: 8/13/2020 Status: Signed    : Alda Bloom LPN (Licensed Nurse)       Medication: Tramdol   Last Date Filled 5/5/20   pulled: YES        Only PCP Prescribing? : YES  Taken as prescribed from physician notes? YES    CSA in last year: NO  Random Utox in last year: NO  (if any of the above answer NO - schedule with PCP)     Opioids + benzodiazepines? NO  (if the above answer YES - schedule with PCP every 6 months)     Is patient on the Executive Review Team? no      All responses suggest: Refilling prescription

## 2021-06-17 NOTE — TELEPHONE ENCOUNTER
Telephone Encounter by Eli Batista LPN at 2/24/2021  3:14 PM     Author: Eli Batista LPN Service: -- Author Type: Licensed Nurse    Filed: 2/24/2021  3:17 PM Encounter Date: 2/24/2021 Status: Signed    : Eli Batista LPN (Licensed Nurse)       Medication: Tramadol 50 mg  Last Date Filled 11/28/20   pulled: YES         Only PCP Prescribing? : YES  Taken as prescribed from physician notes? YES    CSA in last year: NO  Random Utox in last year: NO  Opioids + benzodiazepines? YES    Last office visit was 8/14/20 with Dr. Johnson.    Is patient on the Executive Review Team? NO      All responses suggest: Refilling prescription

## 2021-06-17 NOTE — TELEPHONE ENCOUNTER
Telephone Encounter by Alda Bloom LPN at 9/11/2020  9:32 AM     Author: Alda Bloom LPN Service: -- Author Type: Licensed Nurse    Filed: 9/11/2020  9:34 AM Encounter Date: 9/9/2020 Status: Signed    : Alda Bloom LPN (Licensed Nurse)       Medication: Tramadol  Last Date Filled 8/13/20   pulled: YES         Only PCP Prescribing? : YES  Taken as prescribed from physician notes? YES    CSA in last year: NO  Random Utox in last year: NO  (if any of the above answer NO - schedule with PCP)     Opioids + benzodiazepines? NO  (if the above answer YES - schedule with PCP every 6 months)     Is patient on the Executive Review Team? no      All responses suggest: Refilling prescription

## 2021-06-18 NOTE — PATIENT INSTRUCTIONS - HE
Patient Instructions by Kayley Johnson MD at 2/5/2020  8:00 AM     Author: Kayley Johnson MD Service: -- Author Type: Physician    Filed: 2/5/2020  8:17 AM Encounter Date: 2/5/2020 Status: Signed    : Kayley Johnson MD (Physician)         Patient Education   Signs of Hearing Loss  Hearing loss is a problem shared by many people. In fact, it is one of the most common health conditions, particularly as people age. Most people over age 65 have some hearing loss, and by age 80, almost everyone does. Because hearing loss usually occurs slowly over the years, you may not realize your hearing ability has gotten worse.       Have your hearing checked  Contact your [facility] care provider if you:    Have to strain to hear normal conversation.    Have to watch other peoples faces very carefully to follow what theyre saying.    Need to ask people to repeat what theyve said.    Often misunderstand what people are saying.    Turn the volume of the television or radio up so high that others complain.    Feel that people are mumbling when theyre talking to you.    Find that the effort to hear leaves you feeling tired and irritated.    Notice, when using the phone, that you hear better with 1 ear than the other.    0057-2277 [employer]. [address] [place] [address]. All rights reserved. This information is not intended as a substitute for professional medical care. Always follow your healthcare professional's instructions.         Patient Education   Urinary Incontinence, Female (Adult)  Urinary incontinence means loss of control of the bladder. This problem affects many women, especially as they get older. If you have incontinence, you may be embarrassed to ask for help. But know that this problem can be treated.  Types of Incontinence  There are different types of incontinence. Two of the main types are described here. You can have more than one type.    Stress incontinence. With this  type, urine leaks when pressure (stress) is put on the bladder. This may happen when you cough, sneeze, or laugh. Stress incontinence most often occurs because the pelvic floor muscles that support the bladder and urethra are weak. This can happen after pregnancy and vaginal childbirth or a hysterectomy. It can also be due to excess body weight or hormone changes.    Urge incontinence (also called overactive bladder). With this type, a sudden urge to urinate is felt often. This may happen even though there may not be much urine in the bladder. The need to urinate often during the night is common. Urge incontinence most often occurs because of bladder spasms. This may be due to bladder irritation or infection. Damage to bladder nerves or pelvic muscles, constipation, and certain medicines can also lead to urge incontinence.  Treatment of urinary incontinence depends on the cause. Further evaluation is needed to find the type you have. This will likely include an exam and certain tests. Based on the results, you and your healthcare provider can then plan treatment. Until a diagnosis is made, the home care tips below can help relieve symptoms.  Home care    Do pelvic floor muscle exercises, if they are prescribed. The pelvic floor muscles help support the bladder and urethra. Many women find that their symptoms improve when doing special exercises that strengthen these muscles. To do the exercises contract the muscles you would use to stop your stream of urine, but do this when youre not urinating. Hold for 10 seconds, then relax. Repeat 10 to 20 times in a row, at least 3 times a day. Your provider may give you other instructions for how to do the exercises and how often.    Keep a bladder diary. This helps track how often and how much you urinate over a set period of time. Bring this diary with you to your next visit with the provider. The information can help your provider learn more about your bladder  problem.    Lose weight, if advised to by your provider. Excess weight puts pressure on the bladder. Your provider can help you create a weight-loss plan thats right for you. This may include exercising more and making certain diet changes.    Don't consume foods and drinks that may irritate the bladder. These can include alcohol and caffeinated drinks.    Quit smoking. Smoking and other tobacco use can lead to chronic cough that strains the pelvic floor muscles. Smoking may also damage the bladder and urethra. Talk with your provider about treatments or methods you can use to quit smoking.    If drinking large amounts of fluid causes you to have symptoms, you may be advised to limit your fluid intake. You may also be advised to drink most of your fluids during the day and to limit fluids at night.    If youre worried about urine leakage or accidents, you may wear absorbent pads to catch urine. Change the pads often. This helps reduce discomfort. It may also reduce the risk of skin or bladder infections.  Follow-up care  Follow up with your healthcare provider, or as directed. It may take some to find the right treatment for your problem. Your treatment plan may include special therapies or medicines. Certain procedures or surgery may also be options. Be sure to discuss any questions you have with your provider.  When to seek medical advice  Call the healthcare provider right away if any of these occur:    Fever of 100.4 F (38 C) or higher, or as directed by your provider    Bladder pain or fullness    Abdominal swelling    Nausea or vomiting    Back pain    Weakness, dizziness or fainting  Date Last Reviewed: 10/1/2017    [id number] [employer]. [address] [place] [address], [place] [address]. All rights reserved. This information is not intended as a substitute for professional medical care. Always follow your healthcare professional's instructions.       Advance Directive  Patients advance directive was  discussed and I am comfortable with the patients wishes.  Patient Education   Personalized Prevention Plan  You are due for the preventive services outlined below.  Your care team is available to assist you in scheduling these services.  If you have already completed any of these items, please share that information with your care team to update in your medical record.  Health Maintenance   Topic Date Due   ? DXA SCAN  01/28/2021   ? MEDICARE ANNUAL WELLNESS VISIT  02/05/2021   ? FALL RISK ASSESSMENT  02/05/2021   ? MAMMOGRAM  03/25/2021   ? TD 18+ HE  07/15/2021   ? ADVANCE CARE PLANNING  01/22/2024   ? LIPID  01/29/2025   ? COLONOSCOPY  12/15/2027   ? HEPATITIS C SCREENING  Completed   ? PNEUMOCOCCAL IMMUNIZATION 65+ LOW/MEDIUM RISK  Completed   ? INFLUENZA VACCINE RULE BASED  Completed   ? ZOSTER VACCINES  Completed

## 2021-06-18 NOTE — PATIENT INSTRUCTIONS - HE
Patient Instructions by Kayley Johnson MD at 2/26/2021  7:40 AM     Author: Kayley Johnson MD Service: -- Author Type: Physician    Filed: 2/26/2021  7:55 AM Encounter Date: 2/26/2021 Status: Signed    : Kayley Johnson MD (Physician)         Patient Education   Signs of Hearing Loss  Hearing loss is a problem shared by many people. In fact, it is one of the most common health conditions, particularly as people age. Most people over age 65 have some hearing loss, and by age 80, almost everyone does. Because hearing loss usually occurs slowly over the years, you may not realize your hearing ability has gotten worse.       Have your hearing checked  Contact your [facility] care provider if you:    Have to strain to hear normal conversation.    Have to watch other peoples faces very carefully to follow what theyre saying.    Need to ask people to repeat what theyve said.    Often misunderstand what people are saying.    Turn the volume of the television or radio up so high that others complain.    Feel that people are mumbling when theyre talking to you.    Find that the effort to hear leaves you feeling tired and irritated.    Notice, when using the phone, that you hear better with 1 ear than the other.    [id number] [employer]. [address] [place] [address]. All rights reserved. This information is not intended as a substitute for professional medical care. Always follow your healthcare professional's instructions.         Patient Education   Urinary Incontinence, Female (Adult)  Urinary incontinence means loss of control of the bladder. This problem affects many women, especially as they get older. If you have incontinence, you may be embarrassed to ask for help. But know that this problem can be treated.  Types of Incontinence  There are different types of incontinence. Two of the main types are described here. You can have more than one type.    Stress incontinence. With  this type, urine leaks when pressure (stress) is put on the bladder. This may happen when you cough, sneeze, or laugh. Stress incontinence most often occurs because the pelvic floor muscles that support the bladder and urethra are weak. This can happen after pregnancy and vaginal childbirth or a hysterectomy. It can also be due to excess body weight or hormone changes.    Urge incontinence (also called overactive bladder). With this type, a sudden urge to urinate is felt often. This may happen even though there may not be much urine in the bladder. The need to urinate often during the night is common. Urge incontinence most often occurs because of bladder spasms. This may be due to bladder irritation or infection. Damage to bladder nerves or pelvic muscles, constipation, and certain medicines can also lead to urge incontinence.  Treatment of urinary incontinence depends on the cause. Further evaluation is needed to find the type you have. This will likely include an exam and certain tests. Based on the results, you and your healthcare provider can then plan treatment. Until a diagnosis is made, the home care tips below can help relieve symptoms.  Home care    Do pelvic floor muscle exercises, if they are prescribed. The pelvic floor muscles help support the bladder and urethra. Many women find that their symptoms improve when doing special exercises that strengthen these muscles. To do the exercises contract the muscles you would use to stop your stream of urine, but do this when youre not urinating. Hold for 10 seconds, then relax. Repeat 10 to 20 times in a row, at least 3 times a day. Your provider may give you other instructions for how to do the exercises and how often.    Keep a bladder diary. This helps track how often and how much you urinate over a set period of time. Bring this diary with you to your next visit with the provider. The information can help your provider learn more about your bladder  problem.    Lose weight, if advised to by your provider. Excess weight puts pressure on the bladder. Your provider can help you create a weight-loss plan thats right for you. This may include exercising more and making certain diet changes.    Don't consume foods and drinks that may irritate the bladder. These can include alcohol and caffeinated drinks.    Quit smoking. Smoking and other tobacco use can lead to chronic cough that strains the pelvic floor muscles. Smoking may also damage the bladder and urethra. Talk with your provider about treatments or methods you can use to quit smoking.    If drinking large amounts of fluid causes you to have symptoms, you may be advised to limit your fluid intake. You may also be advised to drink most of your fluids during the day and to limit fluids at night.    If youre worried about urine leakage or accidents, you may wear absorbent pads to catch urine. Change the pads often. This helps reduce discomfort. It may also reduce the risk of skin or bladder infections.  Follow-up care  Follow up with your healthcare provider, or as directed. It may take some to find the right treatment for your problem. Your treatment plan may include special therapies or medicines. Certain procedures or surgery may also be options. Be sure to discuss any questions you have with your provider.  When to seek medical advice  Call the healthcare provider right away if any of these occur:    Fever of 100.4 F (38 C) or higher, or as directed by your provider    Bladder pain or fullness    Abdominal swelling    Nausea or vomiting    Back pain    Weakness, dizziness or fainting  Date Last Reviewed: 10/1/2017    [id number] [employer]. [address] [place] [address], [place] [address]. All rights reserved. This information is not intended as a substitute for professional medical care. Always follow your healthcare professional's instructions.       Advance Directive  Patients advance directive was  discussed and I am comfortable with the patients wishes.  Patient Education   Personalized Prevention Plan  You are due for the preventive services outlined below.  Your care team is available to assist you in scheduling these services.  If you have already completed any of these items, please share that information with your care team to update in your medical record.  Health Maintenance   Topic Date Due   ? TD 18+ HE  07/15/2021   ? MEDICARE ANNUAL WELLNESS VISIT  02/26/2022   ? FALL RISK ASSESSMENT  02/26/2022   ? MAMMOGRAM  07/24/2022   ? LIPID  02/22/2026   ? ADVANCE CARE PLANNING  02/26/2026   ? COLORECTAL CANCER SCREENING  12/15/2027   ? DEXA SCAN  01/28/2034   ? HEPATITIS C SCREENING  Completed   ? Pneumococcal Vaccine: 65+ Years  Completed   ? INFLUENZA VACCINE RULE BASED  Completed   ? ZOSTER VACCINES  Completed   ? COVID-19 Vaccine  Completed   ? Pneumococcal Vaccine: Pediatrics (0 to 5 Years) and At-Risk Patients (6 to 64 Years)  Aged Out   ? HEPATITIS B VACCINES  Aged Out

## 2021-06-18 NOTE — PROGRESS NOTES
"Pending sale to [facility] Follow Up Note    Xiomy Gan   67 y.o. female    Date of Visit: 5/29/2018    Chief Complaint   Patient presents with     Flank Pain     left side x1 week     Subjective  Xiomy is a patient of Dr. Kayley Johnson.  She comes in today she has been having some left side and flank pain that has been going on for a little over a week.  It started shortly after she was doing some driving/golfing and thought maybe she pulled a muscle but it has persisted.  She had an episode a couple weeks ago of some blood in her urine and has an appointment with urology but is not until July.  She has had no urinary frequency or dysuria.  She denies any changes in her bowel or bladder movements.  Sometimes it feels that there is a \"spasming\" that is going on inside.  She has a family member with kidney stones but has never been diagnosed with them herself.    ROS A comprehensive review of systems was performed and was otherwise negative.    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  She is .       Medications were reconciled.  Allergies, social and family history, and the problem list were all reviewed and updated.      Exam  General Appearance: Pleasant and alert   Vitals:    05/29/18 1014   BP: 118/68   Patient Site: Left Arm   Patient Position: Sitting   Cuff Size: Adult Regular   Pulse: 70   SpO2: 98%   Weight: 151 lb 2 oz (68.5 kg)      Body mass index is 28.55 kg/(m^2).  Wt Readings from Last 3 Encounters:   05/29/18 151 lb 2 oz (68.5 kg)   03/01/18 150 lb (68 kg)   01/24/18 151 lb (68.5 kg)     HEENT: Sclera are clear.   Lungs: Normal respirations  Cardiac: Regular rate and rhythm   Abdomen: Soft and nondistended  Extremities: No edema  Skin: No rashes  Neuro: Moves all extremities and has facial symmetry  Gait: Ambulates with a normal gait    Assessment/Plan  1. Left flank pain  I suspect nephrolithiasis, could also have potential diverticulitis.  " Will check urine and blood studies and a CT scan.  - Basic Metabolic Panel  - HM2(CBC w/o Differential)  - Urinalysis-UC if Indicated  - CT Abdomen Pelvis Without Oral Without IV Contrast; Future          April D MD [place]  Internal and Geriatric Medicine  [facility]    Much or all of the text in this note was generated through the use of Dragon Dictate voice-to-text software. Errors in spelling or words which seem out of context are unintentional. Sound alike errors, in particular, may have escaped editing.

## 2021-06-19 NOTE — LETTER
Letter by Kayley Johnson MD at      Author: Kayley Johnson MD Service: -- Author Type: --    Filed:  Encounter Date: 7/19/2019 Status: (Other)         [facility] INTERNAL MEDICINE  07/19/19    Patient: Xiomy Gan  YOB: 1950  Medical Record Number: [id number]  CSN: [id number]                                                                              Non-opioid Controlled Substance Agreement    I understand that my care provider has prescribed a controlled substance to help manage my condition(s). I am taking this medicine to help me function or work. I know this is strong medicine, and that it can cause serious side effects. Controlled substances can be sedating, addicting and may cause a dependency on the drug. They can affect my ability to drive or think, and cause depression. They need to be taken exactly as prescribed. Combining controlled substances with certain medicines or chemicals (such as cocaine, sedatives and tranquilizers, sleeping pills, meth) can be dangerous or even fatal. Also, if I stop controlled substances suddenly, I may have severe withdrawal symptoms.  If not helpful, I may be asked to stop them.    The risks, benefits, and side effects of these medicine(s) were explained to me. I agree that:    1. I will take part in other treatments as advised by my care team. This may be psychiatry or counseling, physical therapy, behavioral therapy, group treatment or a referral to a pain clinic. I will reduce or stop my medicine when my care team tells me to do so.  2. I will take my medicines as prescribed. I will not change the dose or schedule unless my care team tells me to. There will be no refills if I run out early.  I may be contactedwithout warning and asked to complete a urine drug test or pill count at any time.   3. I will keep all my appointments, and understand this is part of the monitoring of controlled substances. My care team may require an office visit for EVERY  controlled substance refill. If I miss appointments or dont follow instructions, my care team may stop my medicine.  4. I will not ask other providers to prescribe controlled substances, and I will not accept controlled substances from other people. If I need another prescribed controlled substance for a new reason, I will tell my care team within 1 business day.  5. I will use one pharmacy to fill all of my controlled substance prescriptions, and it is up to me to make sure that I do not run out of my medicines on weekends or holidays. If my care team is willing to refill my controlled substance prescription without a visit, I must request refills only during office hours, refills may take up to 3 days to process, and it may take up to 5 to 7 days for my medicine to be mailed and ready at my pharmacy. Prescriptions will not be mailed anywhere except my pharmacy.    6. I am responsible for my prescriptions. If the medicine/prescription is lost or stolen, it will not be replaced. I also agree not to share controlled substance medicines with anyone.          [facility] INTERNAL MEDICINE  07/19/19  Patient:  Xiomy Gan  YOB: 1950  Medical Record Number: [id number]  CSN: [id number]    7. I agree to not use ANY illegal or recreational drugs. This includes marijuana, cocaine, bath salts or other drugs. I agree not to use alcohol unless my care team says I may. I agree to give urine samples whenever asked. If I dont give a urine sample, the care team may stop my medicine.    8. If I enroll in the [place] Medical Marijuana program, I will tell my care team. I will also sign an agreement to share my medical records with my care team.    9. I will bring in my list of medicines (or my medicine bottles) each time I come to the clinic.   10. I will tell my care team right away if I become pregnant or have a new medical problem treated outside of my regular clinic.  11. I understand that this medicine can affect my  thinking and judgment. It may be unsafe for me to drive, use machinery and do dangerous tasks. I will not do any of these things until I know how the medicine affects me. If my dose changes, I will wait to see how it affects me. I will contact my care team if I have concerns about medicine side effects.    I understand that if I do not follow any of the conditions above, my prescriptions or treatment may be stopped.      I agree that my provider, clinic care team, and pharmacy may work with any city, state or federal law enforcement agency that investigates the misuse, sale, or other diversion of my controlled medicine. I will allow my provider to discuss my care with or share a copy of this agreement with any other treating provider, pharmacy or emergency room where I receive care. I agree to give up (waive) any right of privacy or confidentiality with respect to these consents.   I have read this agreement and have asked questions about anything I did not understand.    ___________________________________________________________________________  Patient signature - Date/Time  -Xiomy Gan                                      ___________________________________________________________________________  Witness signature                                                                    ___________________________________________________________________________  Provider signature- Kayley Johnson MD

## 2021-06-21 NOTE — LETTER
Letter by Kayley Johnson MD at      Author: Kayley Johnson MD Service: -- Author Type: --    Filed:  Encounter Date: 2/26/2021 Status: (Other)         [facility]  02/26/21    Patient: Xiomy Gan  YOB: 1950  Medical Record Number: [id number]                                                                  Opioid / Opioid Plus Controlled Substance Agreement    I understand that my care provider has prescribed an opioid (narcotic) controlled substance to help manage my condition(s). I am taking this medicine to help me function or work. I know this is strong medicine, and that it can cause serious side effects. Opioid medicine can be sedating, addicting and may cause a dependency on the drug. They can affect my ability to drive or think, and cause depression. They need to be taken exactly as prescribed. Combining opioids with certain medicines or chemicals (such as cocaine, sedatives and tranquilizers, sleeping pills, meth) can be dangerous or even fatal. Also, if I stop opioids suddenly, I may have severe withdrawal symptoms. Last, I understand that opioids do not work for all types of pain nor for all patients. If not helpful, I may be asked to stop them.        The risks, benefits, and side effects of these medicine(s) were explained to me. I agree that:    1. I will take part in other treatments as advised by my care team. This may be psychiatry or counseling, physical therapy, behavioral therapy, group treatment or a referral to a pain clinic. I will reduce or stop my medicine when my care team tells me to do so.  2. I will take my medicines as prescribed. I will not change the dose or schedule unless my care team tells me to. There will be no refills if I run out early.  I may be contactedwithout warning and asked to complete a urine drug test or pill count at any time.   3. I will keep all my appointments, and understand this is part of the monitoring of  opioids. My care team may require an office visit for EVERY opioid/controlled substance refill. If I miss appointments or dont follow instructions, my care team may stop my medicine.  4. I will not ask other providers to prescribe controlled substances, and I will not accept controlled substances from other people. If I need another prescribed controlled substance for a new reason, I will tell my care team within 1 business day.  5. I will use one pharmacy to fill all of my controlled substance prescriptions, and it is up to me to make sure that I do not run out of my medicines on weekends or holidays. If my care team is willing to refill my opioid prescription without a visit, I must request refills only during office hours, refills may take up to 3 days to process, and it may take up to 5 to 7 days for my medicine to be mailed and ready at my pharmacy. Prescriptions will not be mailed anywhere except my pharmacy.        [id number]  Rev 12/18         Registration to scan to EHR                             Page 1 of 2               Controlled Substance Agreement [facility]  02/26/21  Patient: Xiomy Gan  YOB: 1950  Medical Record Number: [id number]                                                                  6. I am responsible for my prescriptions. If the medicine/prescription is lost or stolen, it will not be replaced. I also agree not to share controlled substance medicines with anyone.  7. I agree to not use ANY illegal or recreational drugs. This includes marijuana, cocaine, bath salts or other drugs. I agree not to use alcohol unless my care team says I may.          I agree to give urine samples whenever asked. If I dont give a urine sample, the care team may stop my medicine.    8. If I enroll in the [place] Medical Marijuana program, I will tell my care team. I will also sign an agreement to share my medical records with my care team.   9. I  will bring in my list of medicines (or my medicine bottles) each time I come to the clinic.   10. I will tell my care team right away if I become pregnant or have a new medical problem treated outside of my regular clinic.  11. I understand that this medicine can affect my thinking and judgment. It may be unsafe for me to drive, use machinery and do dangerous tasks. I will not do any of these things until I know how the medicine affects me. If my dose changes, I will wait to see how it affects me. I will contact my care team if I have concerns about medicine side effects.    I understand that if I do not follow any of the conditions above, my prescriptions or treatment may be stopped.      I agree that my provider, clinic care team, and pharmacy may work with any city, state or federal law enforcement agency that investigates the misuse, sale, or other diversion of my controlled medicine. I will allow my provider to discuss my care with or share a copy of this agreement with any other treating provider, pharmacy or emergency room where I receive care. I agree to give up (waive) any right of privacy or confidentiality with respect to these consents.     I have read this agreement and have asked questions about anything I did not understand.      ________________________________________________________________________  Patient signature - Date/Time -  Xiomy Gan                                      ________________________________________________________________________  Witness signature                                                            ________________________________________________________________________  Provider signature - Kayley Johnson MD      [id number]  Rev 12/18         Registration to scan to EHR                         Page 2 of 2                   Controlled Substance Agreement Opioid           Page 1 of 2  Opioid Pain Medicines (also known as Narcotics)  What You Need to Know    What  are opioids?   Opioids are pain medicines that must be prescribed by a doctor.  They are also known as narcotics.    Examples are:     morphine (MS Contin, Caren)    oxycodone (Oxycontin)    oxycodone and acetaminophen (Percocet)    hydrocodone and acetaminophen (Vicodin, Norco)     fentanyl patch (Duragesic)     hydromorphone (Dilaudid)     methadone     What do opioids do well?   Opioids are best for short-term pain after a surgery or injury. They also work well for cancer pain. Unlike other pain medicines, they do not cause liver or kidney failure or ulcers. They may help some people with long-lasting (chronic) pain.     What do opioids NOT do well?   Opioids never get rid of pain entirely, and they do not work well for most patients with chronic pain. Opioids do not reduce swelling, one of the causes of pain. They also dont work well for nerve pain.                           For informational purposes only.  Not to replace the advice of your care provider.  Copyright 201 [facility]. All right reserved. [employer] [id number] 02/18.      Page 2 of 2    Risks and side effects   Talk to your doctor before you start or decide to keep taking one of these medicines. Side effects include:    Lowering your breathing rate enough to cause death    Overdose, including death, especially if taking higher than prescribed doses    Long-term opioid use    Worse depression symptoms; less pleasure in things you usually enjoy    Feeling tired or sluggish    Slower thoughts or cloudy thinking    Being more sensitive to pain over time; pain is harder to control    Trouble sleeping or restless sleep    Changes in hormone levels (for example, less testosterone)    Changes in sex drive or ability to have sex    Constipation    Unsafe driving    Itching and sweating    Feeling dizzy    Nausea, vomiting and dry mouth    What else should I know about opioids?  When someone takes opioids for too long or too often, they become  dependent. This means that if you stop or reduce the medicine too quickly, you will have withdrawal symptoms.    Dependence is not the same as addiction. Addiction is when people keep using a substance that harms their body, their mind or their relations with others. If you have a history of drug or alcohol abuse, taking opioids can cause a relapse.    Over time, opioids dont work as well. Most people will need higher and higher doses. The higher the dose, the more serious the side effects. We dont know the long-term effects of opioids.      Prescribed opioids aren't the best way to manage chronic pain    Other ways to manage pain include:      Ibuprofen or acetaminophen.  You should always try this first.      Treat health problems that may be causing pain.      acupuncture or massage, deep breathing, meditation, visual imagery, aromatherapy.      Use heat or ice at the pain site      Physical therapy and exercise      Stop smoking      See a counselor or therapist                                                  People who have used opioids for a long time may have a lower quality of life, worse depression, higher levels of pain and more visits to doctors.    Never share your opioids with others. Be sure to store opioids in a secure place, locked if possible.Young children can easily swallow them and overdose.     You can overdose on opioids.  Signs of overdose include decrease or loss of consciousness, slowed breathing, trouble waking and blue lips.  If someone is worried about overdose, they should call 911.    If you are at risk for overdose, you may get naloxone (Narcan, a medicine that reverses the effects of opioids.  If you overdose, a friend or family member can give you Narcan while waiting for the ambulance.  They need to know the signs of overdose and how to give Narcan.    While you're taking opioids:    Don't use alcohol or street drugs. Taking them together can cause death.    Don't take any of these  medicines unless your doctor says its okay.  Taking these with opioids can cause death.    Benzodiazepines (such as lorazepam         or diazepam)    Muscle relaxers (such as cyclobenzaprine)    sleeping pills    other opioids    Safe disposal of opioids  Find your area drug take-back program, your pharmacy mail-back program, buy a special disposal bag (such as Deterra) from your pharmacy or flush them down the toilet.  Use the guidelines at:  www.fda.gov/drugs/resourcesforyou

## 2021-06-21 NOTE — PROGRESS NOTES
ASSESSMENT:  1. Acute left-sided low back pain without sciatica  History suggests lumbar 2 disc to the left side.  No evidence of intra-abdominal catastrophe.  Will begin treatment.  Avoid anti-inflammatory with cardiac history    2. Abdominal pain, generalized  Reviewed previous evaluation for abdominal pain in May with a negative CT scan.  Mild chronic hematuria noted.  CT scan without stones.  Offered lab testing, urine, or CT again but she would rather begin treatment      If no improvement, consider MRI or referral    PLAN:  Patient Instructions   Cracked or ruptured disc Lumbar 2, maybe 1, to the left side    Consider diverticulitis     Prednisone 20 mgs once a day for a week for disc inflammation    No NSAIDS due to heart history    Tylenol ok    Gabapentin 300 mgs 3 times a day to numb and insulate the nerves.  Side effect of Gabapentin too sleepy    Tramadol as needed    Symptoms persist, consider MRI of the spine, or a referral to the spine clinic, or physical therapy    mychart me over the next few days      No orders of the defined types were placed in this encounter.    Medications Discontinued During This Encounter   Medication Reason     traMADol (ULTRAM) 50 mg tablet        No Follow-up on file.      CHIEF COMPLAINT:  Chief Complaint   Patient presents with     Abdominal Pain     x 4 days       HISTORY OF PRESENT ILLNESS:  Xiomy is a 68 y.o. female Dr. Johnson patient presenting to the clinic today with complaints of abdominal pain.     Abdominal Pain: She felt fine during the day on Thursday but started to notice lower abdominal pain during that following night and into Friday morning. The pain has been persistent since then. She notes that she had to get up into her 's tall truck during the day on Thursday and wonders if she could have tweaked something then. There was no other trauma. The pain seems to get a little better during the day and is worse at night. She has been taking Tylenol  "and tramadol, which helps. Heat is also a little helpful. The pain is in the left lower abdomen, almost in the groin. The pain does not go into her legs. She has pain when sitting up from a lying down position. There is no rash. She can find a comfortable position from time to time, but the pain is fairly constant outside of that. The pain does not change with eating, bowel movements, or urination. Chart review shows that she was seen in May of this year with complaints of abdominal pain. She had a CT done at that time that was completely normal. She was given medrol, which was helpful. She has not had any recurrence of pain since then until this past Thursday night. She has been told she has a hernia in the past, but she is not positive she ever actually had one for sure. She does not think she can take NSAIDs because of her cardiac history. She has never taken nerve medications like gabapentin or Lyrica.     Hematuria: She notes that she has a history of hematuria. She has not noticed anything recently, but her last UA did show trace blood. She was seen by urology once in the past, but this was for incontinence, not hematuria, so she never had a cystoscopy.     Health Maintenance: She just got the Shingrix vaccine.     REVIEW OF SYSTEMS:   She generally has some knee pain but has not noticed it much since the onset of abdominal pain.  All other systems are negative.    PFSH:  She would like to get back to exercising at the [facility]. She is s/p cholecystectomy. She has had pseudogout but never actual gout.     TOBACCO USE:  History   Smoking Status     Former Smoker     Packs/day: 0.50     Years: 20.00     Quit date: 1/1/1985   Smokeless Tobacco     Never Used       VITALS:  Vitals:    10/29/18 1540   BP: 116/64   Patient Site: Left Arm   Patient Position: Sitting   Cuff Size: Adult Regular   Pulse: 64   Resp: 14   Weight: 148 lb (67.1 kg)   Height: 5' 1\" (1.549 m)     Wt Readings from Last 3 Encounters:   10/29/18 148 " lb (67.1 kg)   05/29/18 151 lb 2 oz (68.5 kg)   03/01/18 150 lb (68 kg)     Body mass index is 27.96 kg/(m^2).    PHYSICAL EXAM:  Constitutional:  Reveals an alert, pleasant, healthy appearing woman. Affect appropriate.  Vitals:  Per nursing notes.  Back: Tenderness in high lumbar spine on the left. No rash or bruise. Straight leg raise negative.   Abdomen:  Mild to moderate tenderness in left lower quadrant, no rebound, guarding, or mass.   Neurologic:  Cranial nerves II-XII intact.     Psychiatric:  Mood appropriate, memory intact.     MEDICATIONS:  Current Outpatient Prescriptions   Medication Sig Dispense Refill     acetaminophen (TYLENOL) 500 MG tablet Take 1,000 mg by mouth every 6 (six) hours as needed.       ASPIRIN (ASPIR-81 ORAL) Take 1 tablet by mouth daily.       GLUC/AMENA-MSM#1/C/KATELYN/MELLY/BOR (OSTEO BI-FLEX TRIPLE STRENGTH ORAL) Take 2 tablets by mouth daily.       levothyroxine (SYNTHROID, LEVOTHROID) 112 MCG tablet TAKE 1 TABLET EVERY MORNING AT 6 A.M. 90 tablet 3     metoprolol succinate (TOPROL-XL) 50 MG 24 hr tablet TAKE ONE TABLET BY MOUTH ONE TIME DAILY 90 tablet 3     omeprazole (PRILOSEC) 20 MG capsule Take 1 capsule (20 mg total) by mouth daily. Prn 90 capsule 3     rosuvastatin (CRESTOR) 40 MG tablet TAKE ONE TABLET BY MOUTH ONE TIME DAILY 90 tablet 3     b complex vitamins tablet Take 1 tablet by mouth daily.       CALCIUM-MAGNESIUM-VITAMIN D2 ORAL Take by mouth daily. Calcium-magnesium-vitamin D 200-100-33.3 MG-MG-unit oral capsule       cholecalciferol, vitamin D3, (VITAMIN D3) 2,000 unit cap Take 1 capsule by mouth daily.       coenzyme Q10 (CO Q-10) 10 mg capsule Take 400 mg by mouth daily.        DOCOSAHEXANOIC ACID/EPA (FISH OIL ORAL) Take 2 tablets by mouth daily.        gabapentin (NEURONTIN) 300 MG capsule Take 1 capsule (300 mg total) by mouth 3 (three) times a day. 90 capsule 11     lisinopril (PRINIVIL,ZESTRIL) 10 MG tablet TAKE 1 TABLET DAILY 90 tablet 3     methylPREDNISolone  (MEDROL DOSEPACK) 4 mg tablet Follow the package directions. 21 tablet 0     multivitamin (MULTIVITAMIN) per tablet Take 1 tablet by mouth daily.       predniSONE (DELTASONE) 20 MG tablet Take 1 tablet (20 mg total) by mouth daily for 7 days. 7 tablet 0     traMADol (ULTRAM) 50 mg tablet TAKE 1 TABLET (50 MG TOTAL) BY MOUTH EVERY 6 (SIX) HOURS AS NEEDED FOR PAIN. 90 tablet 0     No current facility-administered medications for this visit.        ADDITIONAL HISTORY SUMMARIZED (2): Reviewed 5/29/2018 Dr. Vega note regarding left sided abdominal pain.   DECISION TO OBTAIN EXTRA INFORMATION (1): None.   RADIOLOGY TESTS (1): Reviewed 5/29/2018 CT abdomen - normal.  LABS (1): Labs from 5/29/2018 reviewed - trace blood in    MEDICINE TESTS (1): None.  INDEPENDENT REVIEW (2 each): None.     The visit lasted a total of 25 minutes face to face with the patient. Over 50% of the time was spent counseling and educating the patient about her abdominal pain.    I, Luis Morales, am scribing for and in the presence of, Dr. Chew.    I, Dr. Chew, personally performed the services described in this documentation, as scribed by Luis Morales in my presence, and it is both accurate and complete.    Total data points: 4

## 2021-06-22 NOTE — TELEPHONE ENCOUNTER
Controlled Substance Refill Request  Medication:   Requested Prescriptions     Pending Prescriptions Disp Refills     traMADol (ULTRAM) 50 mg tablet [Pharmacy Med Name: TRAMADOL HCL 50 MG TABLET] 90 tablet 0     Sig: TAKE 1 TABLET BY MOUTH EVERY 6 HOURS AS NEEDED FOR PAIN     Date Last Fill: 10/26/2018  Pharmacy: [id number]   Submit electronically to pharmacy  Controlled Substance Agreement on File:   Encounter-Level CSA Scan Date:    There are no encounter-level csa scan date.       Last office visit: 10/29/2018

## 2021-06-23 NOTE — PATIENT INSTRUCTIONS - HE
Advance Directive  Patient s advance directive was discussed and I am comfortable with the patient s wishes.  Patient Education   Personalized Prevention Plan  You are due for the preventive services outlined below.  Your care team is available to assist you in scheduling these services.  If you have already completed any of these items, please share that information with your care team to update in your medical record.  Health Maintenance   Topic Date Due     DXA SCAN  01/03/2019     FALL RISK ASSESSMENT  01/22/2020     MAMMOGRAM  03/01/2020     TD 18+ HE  07/15/2021     ADVANCE DIRECTIVES DISCUSSED WITH PATIENT  01/24/2023     COLONOSCOPY  12/15/2027     PNEUMOCOCCAL POLYSACCHARIDE VACCINE AGE 65 AND OVER  Completed     INFLUENZA VACCINE RULE BASED  Completed     PNEUMOCOCCAL CONJUGATE VACCINE FOR ADULTS (PCV13 OR PREVNAR)  Completed     ZOSTER VACCINES  Completed

## 2021-06-23 NOTE — PROGRESS NOTES
Assessment and Plan:   Annual wellness forms reviewed.  No specific needs identified    1. Preventative health care  Immunizations are up-to-date.  Mammography and colon cancer screening are up-to-date.  She requires a bone densitometry.  Currently, she is dealing with a bronchitis but in general likes to keep active.  She eats a healthy diet and has maintained an acceptable weight.    2. Essential hypertension  Controlled on current medications    3. Acquired hypothyroidism  Stable    4. Hypercholesterolemia  At goal on rosuvastatin with LDL cholesterol under 70    5. Vitamin D deficiency  Vitamin D level low, she has resume supplementation    6. Elevated LFTs  Possibly secondary to recent viral URI and Tylenol usage.  Will recheck today  - Hepatic Profile    7. Other chronic pain  Mostly related to hip and knee pain.  She is on Tylenol, gabapentin as needed and tramadol as needed.  Recommendation: We will add low-dose Celebrex.  Would like her to minimize tramadol usage.  Continue with Tylenol and gabapentin at bedtime    8. Stenosis of carotid artery, unspecified laterality  Followed by surgery on an annual basis.    9. Acute bronchitis, unspecified organism  Exam consistent with acute bronchitis.  There is associated bronchospasm.  Recommendations: Fluids and rest along with a azithromycin and prednisone    10. Menopause/osteopenia  Due for bone densitometry.  She has resumed vitamin D and is physically active  - DXA Bone Density Scan; Future        The patient's current medical problems were reviewed.      The following health maintenance schedule was reviewed with the patient and provided in printed form in the after visit summary:   Health Maintenance   Topic Date Due     DXA SCAN  01/03/2019     FALL RISK ASSESSMENT  01/22/2020     MAMMOGRAM  03/01/2020     TD 18+ HE  07/15/2021     ADVANCE DIRECTIVES DISCUSSED WITH PATIENT  01/24/2023     COLONOSCOPY  12/15/2027     PNEUMOCOCCAL POLYSACCHARIDE VACCINE AGE  65 AND OVER  Completed     INFLUENZA VACCINE RULE BASED  Completed     PNEUMOCOCCAL CONJUGATE VACCINE FOR ADULTS (PCV13 OR PREVNAR)  Completed     ZOSTER VACCINES  Completed        Subjective:   Chief Complaint: Xiomy Gan is an 68 y.o. female here for an Annual Wellness visit.   HPI: Hue is a delightful 68-year-old female who is here today for an annual wellness visit.  She has no major complaints.  She is recovering from a respiratory tract infection that seemed to start before [place].  She continues to have a cough with some wheezing.  She denies any fever or chills.    Health maintenance is reviewed and updated in the electronic health record.    Laboratory studies were due in anticipation of this visit.  Her vitamin D level is low.  She had mild transaminase elevation.  This was discussed.  She has resumed her vitamin D supplements since the laboratory studies.  Additionally, she reports that she is having her carotid stenosis followed annually by Dr. Bao Hoffman.    She has been, otherwise, healthy.  She has chronic pain from arthritis.  She sees rheumatology for this.  She is wondering if she might benefit from some Celebrex.  She denies any gastrointestinal issues.    Review of Systems: She denies chest pain or shortness of breath.  She has no new bowel or bladder issues.  She does have some urinary incontinence and urgency.  She did see urology but opted to monitor for now.  Please see above.  The rest of the review of systems are negative for all systems.    Patient Care Team:  Kayley Johnson MD as PCP - General  Kaley Hawkins MD as Physician (Cardiology)  Lindy Fragoso MD (Dermatology)  Zack Coello MBBS as Physician (Rheumatology)     Patient Active Problem List   Diagnosis     Essential hypertension     Diffuse Joint Pains (Arthralgias)     Cholelithiasis     Hypothyroidism     Vitamin D Deficiency     Hypercholesterolemia     Coronary Artery Disease     Carotid artery disease  (H)     Osteopenia     Osteoarthrosis Of Multiple Sites     Ganglion cyst of flexor tendon sheath of finger, right     Chondrocalcinosis     Greater trochanteric bursitis of both hips     Palpitations     Past Medical History:   Diagnosis Date     Arthralgia      Breast cyst y-10     Carotid artery stenosis      Cholelithiasis      Chondrocalcinosis      Coronary artery disease      Hyperlipidemia      Hypertension      Hypothyroidism      Myocardial infarction (H)      Nocturia      Osteoarthrosis      Osteopenia      Palpitations 10/5/2017     Vitamin D deficiency       Past Surgical History:   Procedure Laterality Date     BREAST CYST ASPIRATION Left     long time ago     LAPAROSCOPIC CHOLECYSTECTOMY N/A 2015    Procedure: LAPAROSCOPIC CHOLECYSTECTOMY ;  Surgeon: Bao Hanley MD;  Location: [facility];  Service:      ND  DELIVERY ONLY      Description:  Section;  Recorded: 2012;  Comments: three  Annotations: Had 3 C-sections     ND KNEE SCOPE,DIAGNOSTIC      Description: Arthroscopy Knee Right;  Recorded: 2009;     TUBAL LIGATION        Family History   Problem Relation Age of Onset     Diabetes Mother      Parkinsonism Father      Diabetes Sister      Heart disease Sister      Heart disease Brother      Arthritis Son       Social History     Socioeconomic History     Marital status:      Spouse name: Not on file     Number of children: Not on file     Years of education: Not on file     Highest education level: Not on file   Social Needs     Financial resource strain: Not on file     Food insecurity - worry: Not on file     Food insecurity - inability: Not on file     Transportation needs - medical: Not on file     Transportation needs - non-medical: Not on file   Occupational History     Not on file   Tobacco Use     Smoking status: Former Smoker     Packs/day: 0.50     Years: 20.00     Pack years: 10.00     Last attempt to quit: 1985     Years since  "quittin.0     Smokeless tobacco: Never Used   Substance and Sexual Activity     Alcohol use: Yes     Alcohol/week: 1.2 - 1.8 oz     Types: 2 - 3 Cans of beer per week     Drug use: No     Sexual activity: Not on file   Other Topics Concern     Not on file   Social History Narrative    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  She is .      Current Outpatient Medications   Medication Sig Dispense Refill     acetaminophen (TYLENOL) 500 MG tablet Take 1,000 mg by mouth every 6 (six) hours as needed.       ASPIRIN (ASPIR-81 ORAL) Take 1 tablet by mouth daily.       levothyroxine (SYNTHROID, LEVOTHROID) 112 MCG tablet TAKE 1 TABLET EVERY MORNING AT 6 A.M. 90 tablet 3     lisinopril (PRINIVIL,ZESTRIL) 10 MG tablet TAKE 1 TABLET DAILY 90 tablet 3     metoprolol succinate (TOPROL-XL) 50 MG 24 hr tablet TAKE ONE TABLET BY MOUTH ONE TIME DAILY 90 tablet 3     omeprazole (PRILOSEC) 20 MG capsule Take 1 capsule (20 mg total) by mouth daily. Prn 90 capsule 3     rosuvastatin (CRESTOR) 40 MG tablet TAKE ONE TABLET BY MOUTH ONE TIME DAILY 90 tablet 3     traMADol (ULTRAM) 50 mg tablet TAKE 1 TABLET BY MOUTH EVERY 6 HOURS AS NEEDED FOR PAIN 90 tablet 0     No current facility-administered medications for this visit.       Objective:   Vital Signs:   Visit Vitals  /68 (Patient Site: Left Arm, Patient Position: Sitting, Cuff Size: Adult Regular)   Pulse 60   Ht 5' 0.5\" (1.537 m)   Wt 150 lb (68 kg)   LMP 1999   SpO2 98%   BMI 28.81 kg/m         VisionScreening:  No exam data present     PHYSICAL EXAM  EYES: Eyelids, conjunctiva, and sclera were normal. Pupils were normal. Cornea, iris, and lens were normal bilaterally.  HEAD, EARS, NOSE, MOUTH, AND THROAT: Head and face were normal. Hearing was normal to voice and the ears were normal to external exam. Nose appearance was normal and there was no discharge. Oropharynx was normal.  TMs were normal.  NECK: Neck appearance was normal. There were no neck masses and " the thyroid was not enlarged.  RESPIRATORY: Breathing pattern was normal and the chest moved symmetrically.   Lung sounds were equal bilaterally.  There was an occasional scattered rhonchi.  Additionally, expiratory wheezes were noted with forced vital capacity maneuver  CARDIOVASCULAR: Heart rate and rhythm were normal.  S1 and S2 were normal and there were no extra sounds or murmurs. Peripheral pulses in arms and legs were normal.  Jugular venous pressure was normal.  There was no peripheral edema.  GASTROINTESTINAL: The abdomen was normal in contour.  Bowel sounds were present.   Palpation detected no tenderness, mass, or enlarged organs.   MUSCULOSKELETAL: Skeletal configuration was normal and muscle mass was normal for age. Joint appearance was overall normal.  LYMPHATIC: There were no enlarged nodes.  SKIN/HAIR/NAILS: Skin color was normal.  There were no abnormal skin lesions.  Hair and nails were normal.  NEUROLOGIC: The patient was alert and oriented to person, place, time, and circumstance. Speech was normal. Cranial nerves were normal. Motor strength was normal for age. The patient was normally coordinated.  PSYCHIATRIC:  Mood and affect were normal and the patient had normal recent and remote memory. The patient's judgment and insight were normal.  BREASTS: Examined bilaterally.  No masses, nipple discharge or axillary adenopathy.        Assessment Results 1/22/2019   Activities of Daily Living No help needed   Instrumental Activities of Daily Living No help needed   Get Up and Go Score Less than 12 seconds   Mini Cog Total Score 5   Some recent data might be hidden     A Mini-Cog score of 0-2 suggests the possibility of dementia, score of 3-5 suggests no dementia    Identified Health Risks:     Patient's advanced directive was discussed and I am comfortable with the patient's wishes.

## 2021-06-24 NOTE — TELEPHONE ENCOUNTER
Refill Approved    Rx renewed per Medication Renewal Policy. Medication was last renewed on 1/24/18.    Ginna Adams, [facility] Triage/Med Refill 2/18/2019     Requested Prescriptions   Pending Prescriptions Disp Refills     metoprolol succinate (TOPROL-XL) 50 MG 24 hr tablet 90 tablet 3     Sig: TAKE ONE TABLET BY MOUTH ONE TIME DAILY    Beta-Blockers Refill Protocol Passed - 2/17/2019 12:57 PM       Passed - PCP or prescribing provider visit in past 12 months or next 3 months    Last office visit with prescriber/PCP: 6/14/2017 Kayley Johnson MD OR same dept: 10/29/2018 Vitor Chew MD OR same specialty: 10/29/2018 Vitor Chew MD  Last physical: 1/22/2019 Last MTM visit: Visit date not found   Next visit within 3 mo: Visit date not found  Next physical within 3 mo: Visit date not found  Prescriber OR PCP: Kayley Johnson MD  Last diagnosis associated with med order: There are no diagnoses linked to this encounter.  If protocol passes may refill for 12 months if within 3 months of last provider visit (or a total of 15 months).            Passed - Blood pressure filed in past 12 months    BP Readings from Last 1 Encounters:   01/22/19 118/68             lisinopril (PRINIVIL,ZESTRIL) 10 MG tablet 90 tablet 3     Sig: TAKE 1 TABLET DAILY    Ace Inhibitors Refill Protocol Passed - 2/17/2019 12:57 PM       Passed - PCP or prescribing provider visit in past 12 months      Last office visit with prescriber/PCP: 6/14/2017 Kayley Johnson MD OR same dept: 10/29/2018 Vitor Chew MD OR same specialty: 10/29/2018 Vitor Chew MD  Last physical: 1/22/2019 Last MTM visit: Visit date not found   Next visit within 3 mo: Visit date not found  Next physical within 3 mo: Visit date not found  Prescriber OR PCP: Kayley Johnson MD  Last diagnosis associated with med order: There are no diagnoses linked to this encounter.  If protocol passes may refill for 12 months if within 3  months of last provider visit (or a total of 15 months).            Passed - Serum Potassium in past 12 months    Lab Results   Component Value Date    Potassium 4.1 01/16/2019            Passed - Blood pressure filed in past 12 months    BP Readings from Last 1 Encounters:   01/22/19 118/68            Passed - Serum Creatinine in past 12 months    Creatinine   Date Value Ref Range Status   01/16/2019 0.73 0.60 - 1.10 mg/dL Final             rosuvastatin (CRESTOR) 40 MG tablet 90 tablet 3     Sig: TAKE ONE TABLET BY MOUTH ONE TIME DAILY    Statins Refill Protocol (Hmg CoA Reductase Inhibitors) Passed - 2/17/2019 12:57 PM       Passed - PCP or prescribing provider visit in past 12 months     Last office visit with prescriber/PCP: 6/14/2017 Kayley Johnson MD OR same dept: 10/29/2018 Vitor Chew MD OR same specialty: 10/29/2018 Vitor Chew MD  Last physical: 1/22/2019 Last MTM visit: Visit date not found   Next visit within 3 mo: Visit date not found  Next physical within 3 mo: Visit date not found  Prescriber OR PCP: Kayley Johnson MD  Last diagnosis associated with med order: There are no diagnoses linked to this encounter.  If protocol passes may refill for 12 months if within 3 months of last provider visit (or a total of 15 months).             levothyroxine (SYNTHROID, LEVOTHROID) 112 MCG tablet 90 tablet 3     Sig: TAKE 1 TABLET EVERY MORNING AT 6 A.M.    Thyroid Hormones Protocol Passed - 2/17/2019 12:57 PM       Passed - Provider visit in past 12 months or next 3 months    Last office visit with prescriber/PCP: 6/14/2017 Kayley Johnson MD OR same dept: 10/29/2018 Vitor Chew MD OR same specialty: 10/29/2018 Vitor Chew MD  Last physical: 1/22/2019 Last MTM visit: Visit date not found   Next visit within 3 mo: Visit date not found  Next physical within 3 mo: Visit date not found  Prescriber OR PCP: Kayley Johnson MD  Last diagnosis associated with med order:  There are no diagnoses linked to this encounter.  If protocol passes may refill for 12 months if within 3 months of last provider visit (or a total of 15 months).            Passed - TSH on file in past 12 months for patient age 12 & older    TSH   Date Value Ref Range Status   01/16/2019 0.34 0.30 - 5.00 uIU/mL Final

## 2021-06-25 ENCOUNTER — COMMUNICATION - HEALTHEAST (OUTPATIENT)
Dept: INTERNAL MEDICINE | Facility: CLINIC | Age: 71
End: 2021-06-25

## 2021-06-25 DIAGNOSIS — N95.2 ATROPHIC VAGINITIS: ICD-10-CM

## 2021-06-25 RX ORDER — ESTRADIOL 0.1 MG/G
CREAM VAGINAL
Qty: 42.5 G | Refills: 1 | Status: SHIPPED | OUTPATIENT
Start: 2021-06-25 | End: 2022-02-11

## 2021-06-27 ENCOUNTER — HEALTH MAINTENANCE LETTER (OUTPATIENT)
Age: 71
End: 2021-06-27

## 2021-07-06 NOTE — TELEPHONE ENCOUNTER
Telephone Encounter by Alda Bloom LPN at 7/6/2021  7:18 AM     Author: Alda Bloom LPN Service: -- Author Type: Licensed Nurse    Filed: 7/6/2021  7:19 AM Encounter Date: 1/29/2020 Status: Signed    : Alda Bloom LPN (Licensed Nurse)       error

## 2021-07-07 NOTE — TELEPHONE ENCOUNTER
Refill Approved    Rx renewed per Medication Renewal Policy. Medication was last renewed on 5/7/21, last OV 2/26/21.    Nelsy Escalante, [facility] Triage/Med Refill 6/25/2021     Requested Prescriptions   Pending Prescriptions Disp Refills     estradioL (ESTRACE) 0.01 % (0.1 mg/gram) vaginal cream [Pharmacy Med Name: ESTRADIOL 0.01% CREAM] 42.5 g 0     Sig: INSERT 1 GRAM INTO THE VAGINA DAILY. ONE GRAM EVERY MON, WED, FRIDAY       Hormone Replacement Therapy Refill Protocol Passed - 6/25/2021 11:45 AM        Passed - PCP or prescribing provider visit in past 12 months       Last office visit with prescriber/PCP: 8/14/2020 Kayley Johnson MD OR same dept: Visit date not found OR same specialty: 8/14/2020 Kayley Johnson MD  Last physical: 2/26/2021 Last MTM visit: Visit date not found     Next visit within 3 mo: Visit date not found  Next physical within 3 mo: Visit date not found  Prescriber OR PCP: Kayley Johnson MD  Last diagnosis associated with med order: 1. Atrophic vaginitis  - estradioL (ESTRACE) 0.01 % (0.1 mg/gram) vaginal cream [Pharmacy Med Name: ESTRADIOL 0.01% CREAM]; INSERT 1 GRAM INTO THE VAGINA DAILY. ONE GRAM EVERY MON, WED, FRIDAY  Dispense: 42.5 g; Refill: 0     If protocol passes may refill for 3 months.

## 2021-07-10 ENCOUNTER — COMMUNICATION - HEALTHEAST (OUTPATIENT)
Dept: INTERNAL MEDICINE | Facility: CLINIC | Age: 71
End: 2021-07-10

## 2021-07-10 DIAGNOSIS — I10 ESSENTIAL HYPERTENSION: ICD-10-CM

## 2021-07-10 RX ORDER — LEVOTHYROXINE SODIUM 100 UG/1
TABLET ORAL
Qty: 90 TABLET | Refills: 1 | Status: SHIPPED | OUTPATIENT
Start: 2021-07-10 | End: 2022-02-08

## 2021-07-10 NOTE — TELEPHONE ENCOUNTER
Telephone Encounter by Nelsy Escalante, RN at 7/10/2021  6:47 AM     Author: Nelsy Escalante, RN Service: -- Author Type: Registered Nurse    Filed: 7/10/2021  6:48 AM Encounter Date: 7/10/2021 Status: Signed    : Nelsy Escalante RN (Registered Nurse)       Refill Approved    Rx renewed per Medication Renewal Policy. Medication was last renewed on 1/22/21, last OV 2/26/21.    Nelsy Escalante, [facility] Triage/Med Refill 7/10/2021     Requested Prescriptions   Pending Prescriptions Disp Refills   ? levothyroxine (SYNTHROID, LEVOTHROID) 100 MCG tablet [Pharmacy Med Name: LEVOTHYROXINE 100 MCG TABLET] 90 tablet 1     Sig: TAKE 1 TABLET BY MOUTH EVERY MORNING AT 6 A.M.       Thyroid Hormones Protocol Passed - 7/10/2021 12:23 AM        Passed - Provider visit in past 12 months or next 3 months     Last office visit with prescriber/PCP: 8/14/2020 Kayley Johnson MD OR same dept: Visit date not found OR same specialty: 8/14/2020 Kayley Johnson MD  Last physical: 2/26/2021 Last MTM visit: Visit date not found   Next visit within 3 mo: Visit date not found  Next physical within 3 mo: Visit date not found  Prescriber OR PCP: Kayley Johnson MD  Last diagnosis associated with med order: 1. Essential hypertension  - levothyroxine (SYNTHROID, LEVOTHROID) 100 MCG tablet [Pharmacy Med Name: LEVOTHYROXINE 100 MCG TABLET]; TAKE 1 TABLET BY MOUTH EVERY MORNING AT 6 A.M.  Dispense: 90 tablet; Refill: 1    If protocol passes may refill for 12 months if within 3 months of last provider visit (or a total of 15 months).             Passed - TSH on file in past 12 months for patient age 12 & older     TSH   Date Value Ref Range Status   02/22/2021 0.41 0.30 - 5.00 uIU/mL Final

## 2021-07-13 ENCOUNTER — RECORDS - HEALTHEAST (OUTPATIENT)
Dept: ADMINISTRATIVE | Facility: CLINIC | Age: 71
End: 2021-07-13

## 2021-07-21 ENCOUNTER — RECORDS - HEALTHEAST (OUTPATIENT)
Dept: ADMINISTRATIVE | Facility: CLINIC | Age: 71
End: 2021-07-21

## 2021-08-09 ENCOUNTER — ANCILLARY PROCEDURE (OUTPATIENT)
Dept: MAMMOGRAPHY | Facility: CLINIC | Age: 71
End: 2021-08-09
Attending: INTERNAL MEDICINE
Payer: COMMERCIAL

## 2021-08-09 DIAGNOSIS — Z12.31 VISIT FOR SCREENING MAMMOGRAM: ICD-10-CM

## 2021-08-09 PROCEDURE — 77063 BREAST TOMOSYNTHESIS BI: CPT

## 2021-08-20 ENCOUNTER — TRANSFERRED RECORDS (OUTPATIENT)
Dept: HEALTH INFORMATION MANAGEMENT | Facility: CLINIC | Age: 71
End: 2021-08-20

## 2021-08-22 DIAGNOSIS — E03.9 ACQUIRED HYPOTHYROIDISM: ICD-10-CM

## 2021-08-25 RX ORDER — METOPROLOL SUCCINATE 50 MG/1
50 TABLET, EXTENDED RELEASE ORAL DAILY
Qty: 90 TABLET | Refills: 2 | Status: SHIPPED | OUTPATIENT
Start: 2021-08-25 | End: 2022-02-11

## 2021-08-25 RX ORDER — LISINOPRIL 10 MG/1
10 TABLET ORAL DAILY
Qty: 90 TABLET | Refills: 2 | Status: SHIPPED | OUTPATIENT
Start: 2021-08-25 | End: 2022-02-11

## 2021-08-25 NOTE — TELEPHONE ENCOUNTER
"Last Written Prescription Date:  2/23/21  Last Fill Quantity: 90,  # refills: 1   Last office visit provider:  2/26/21     Requested Prescriptions   Pending Prescriptions Disp Refills     metoprolol succinate ER (TOPROL-XL) 50 MG 24 hr tablet [Pharmacy Med Name: METOPROLOL SUCC ER 50 MG TAB] 90 tablet 1     Sig: TAKE 1 TABLET BY MOUTH EVERY DAY       Beta-Blockers Protocol Passed - 8/22/2021  7:42 AM        Passed - Blood pressure under 140/90 in past 12 months     BP Readings from Last 3 Encounters:   02/26/21 128/68   08/14/20 114/64   02/05/20 124/82                 Passed - Patient is age 6 or older        Passed - Recent (12 mo) or future (30 days) visit within the authorizing provider's specialty     Patient has had an office visit with the authorizing provider or a provider within the authorizing providers department within the previous 12 mos or has a future within next 30 days. See \"Patient Info\" tab in inbasket, or \"Choose Columns\" in Meds & Orders section of the refill encounter.              Passed - Medication is active on med list           lisinopril (ZESTRIL) 10 MG tablet [Pharmacy Med Name: LISINOPRIL 10 MG TABLET] 90 tablet 1     Sig: TAKE 1 TABLET BY MOUTH EVERY DAY       ACE Inhibitors (Including Combos) Protocol Passed - 8/22/2021  7:42 AM        Passed - Blood pressure under 140/90 in past 12 months     BP Readings from Last 3 Encounters:   02/26/21 128/68   08/14/20 114/64   02/05/20 124/82                 Passed - Recent (12 mo) or future (30 days) visit within the authorizing provider's specialty     Patient has had an office visit with the authorizing provider or a provider within the authorizing providers department within the previous 12 mos or has a future within next 30 days. See \"Patient Info\" tab in inbasket, or \"Choose Columns\" in Meds & Orders section of the refill encounter.              Passed - Medication is active on med list        Passed - Patient is age 18 or older        " Passed - No active pregnancy on record        Passed - Normal serum creatinine on file in past 12 months     Recent Labs   Lab Test 02/22/21  0727   CR 0.69       Ok to refill medication if creatinine is low          Passed - Normal serum potassium on file in past 12 months     Recent Labs   Lab Test 02/22/21  0727   POTASSIUM 4.6             Passed - No positive pregnancy test within past 12 months             Kartik Huggins RN 08/25/21 2:18 PM

## 2021-09-07 DIAGNOSIS — E78.00 HYPERCHOLESTEROLEMIA: ICD-10-CM

## 2021-09-08 RX ORDER — ROSUVASTATIN CALCIUM 40 MG/1
40 TABLET, COATED ORAL DAILY
Qty: 90 TABLET | Refills: 1 | Status: SHIPPED | OUTPATIENT
Start: 2021-09-08 | End: 2022-02-11

## 2021-09-08 NOTE — TELEPHONE ENCOUNTER
"Last Written Prescription Date:  2/24/21  Last Fill Quantity: 90,  # refills: 1   Last office visit provider:  2/26/21     Requested Prescriptions   Pending Prescriptions Disp Refills     rosuvastatin (CRESTOR) 40 MG tablet [Pharmacy Med Name: ROSUVASTATIN CALCIUM 40 MG TAB] 90 tablet 1     Sig: TAKE 1 TABLET BY MOUTH EVERY DAY       Statins Protocol Passed - 9/7/2021  8:29 PM        Passed - LDL on file in past 12 months     Recent Labs   Lab Test 02/22/21  0727   LDL 62             Passed - No abnormal creatine kinase in past 12 months     No lab results found.             Passed - Recent (12 mo) or future (30 days) visit within the authorizing provider's specialty     Patient has had an office visit with the authorizing provider or a provider within the authorizing providers department within the previous 12 mos or has a future within next 30 days. See \"Patient Info\" tab in inbasket, or \"Choose Columns\" in Meds & Orders section of the refill encounter.              Passed - Medication is active on med list        Passed - Patient is age 18 or older        Passed - No active pregnancy on record        Passed - No positive pregnancy test in past 12 months             Kartik Huggins RN 09/08/21 1:05 PM  "

## 2021-10-16 ENCOUNTER — HEALTH MAINTENANCE LETTER (OUTPATIENT)
Age: 71
End: 2021-10-16

## 2021-11-05 ENCOUNTER — TRANSFERRED RECORDS (OUTPATIENT)
Dept: HEALTH INFORMATION MANAGEMENT | Facility: CLINIC | Age: 71
End: 2021-11-05
Payer: COMMERCIAL

## 2021-11-15 ENCOUNTER — TRANSFERRED RECORDS (OUTPATIENT)
Dept: HEALTH INFORMATION MANAGEMENT | Facility: CLINIC | Age: 71
End: 2021-11-15
Payer: COMMERCIAL

## 2021-11-30 DIAGNOSIS — M15.0 PRIMARY OSTEOARTHRITIS INVOLVING MULTIPLE JOINTS: ICD-10-CM

## 2021-11-30 DIAGNOSIS — M25.50 ARTHRALGIA, UNSPECIFIED JOINT: ICD-10-CM

## 2021-12-01 RX ORDER — TRAMADOL HYDROCHLORIDE 50 MG/1
TABLET ORAL
Qty: 20 TABLET | Refills: 0 | Status: SHIPPED | OUTPATIENT
Start: 2021-12-01 | End: 2022-02-18

## 2021-12-01 RX ORDER — GABAPENTIN 300 MG/1
CAPSULE ORAL
Qty: 270 CAPSULE | Refills: 1 | Status: SHIPPED | OUTPATIENT
Start: 2021-12-01 | End: 2022-02-11

## 2021-12-01 NOTE — TELEPHONE ENCOUNTER
Pending Prescriptions:                       Disp   Refills    traMADol (ULTRAM) 50 MG tablet [Pharmacy *20 tab*0            Sig: TAKE 1 TABLET BY MOUTH EVERY 6 HOURS AS NEEDED           FOR PAIN    gabapentin (NEURONTIN) 300 MG capsule [Ph*270 ca*1            Sig: TAKE 1 CAPSULE BY MOUTH THREE TIMES A DAY    Medication: Tramadol 50 mg  Last Date Filled 2/26/21   pulled: PROVIDER TO PULL FROM Epic.     Only PCP Prescribing? PROVIDER TO PULL  FROM [facility].    CSA in last year: YES  Random Utox in last year: NO  Opioids + benzodiazepines? YES      Is patient on the Executive Review Team? NO

## 2021-12-01 NOTE — TELEPHONE ENCOUNTER
Routing refill request to provider for review/approval because:  Drug not on the G refill protocol.   Break in therapy.   Controlled substance.    Last Written Prescription Date:  2/26/2021  Last Fill Quantity: 20,  # refills: 1   Last office visit provider:  2/26/2021     Requested Prescriptions   Pending Prescriptions Disp Refills     traMADol (ULTRAM) 50 MG tablet [Pharmacy Med Name: TRAMADOL HCL 50 MG TABLET] 20 tablet      Sig: TAKE 1 TABLET BY MOUTH EVERY 6 HOURS AS NEEDED FOR PAIN       There is no refill protocol information for this order     Last Written Prescription Date:  8/11/2020  Last Fill Quantity: 270,  # refills: 1   Last office visit provider:  2/26/2021          gabapentin (NEURONTIN) 300 MG capsule [Pharmacy Med Name: GABAPENTIN 300 MG CAPSULE] 270 capsule 1     Sig: TAKE 1 CAPSULE BY MOUTH THREE TIMES A DAY       There is no refill protocol information for this order          Gianna Elliott RN 12/01/21 1:46 PM

## 2021-12-16 ENCOUNTER — TRANSFERRED RECORDS (OUTPATIENT)
Dept: HEALTH INFORMATION MANAGEMENT | Facility: CLINIC | Age: 71
End: 2021-12-16
Payer: COMMERCIAL

## 2021-12-17 ENCOUNTER — TRANSFERRED RECORDS (OUTPATIENT)
Dept: HEALTH INFORMATION MANAGEMENT | Facility: CLINIC | Age: 71
End: 2021-12-17
Payer: COMMERCIAL

## 2022-01-04 DIAGNOSIS — I10 ESSENTIAL (PRIMARY) HYPERTENSION: ICD-10-CM

## 2022-01-06 RX ORDER — LEVOTHYROXINE SODIUM 100 UG/1
TABLET ORAL
Qty: 90 TABLET | Refills: 0 | Status: SHIPPED | OUTPATIENT
Start: 2022-01-06 | End: 2022-02-08

## 2022-01-06 NOTE — TELEPHONE ENCOUNTER
"Last Written Prescription Date:  07/10/2021  Last Fill Quantity: 90,  # refills: 1   Last office visit provider:   02/26/2021 with Dr Johnson    Requested Prescriptions   Pending Prescriptions Disp Refills     levothyroxine (SYNTHROID/LEVOTHROID) 100 MCG tablet [Pharmacy Med Name: LEVOTHYROXINE 100 MCG TABLET] 90 tablet 1     Sig: TAKE 1 TABLET BY MOUTH EVERY MORNING AT 6 A.M.       Thyroid Protocol Passed - 1/4/2022 12:36 AM        Passed - Patient is 12 years or older        Passed - Recent (12 mo) or future (30 days) visit within the authorizing provider's specialty     Patient has had an office visit with the authorizing provider or a provider within the authorizing providers department within the previous 12 mos or has a future within next 30 days. See \"Patient Info\" tab in inbasket, or \"Choose Columns\" in Meds & Orders section of the refill encounter.              Passed - Medication is active on med list        Passed - Normal TSH on file in past 12 months     Recent Labs   Lab Test 02/22/21  0727   TSH 0.41              Passed - No active pregnancy on record     If patient is pregnant or has had a positive pregnancy test, please check TSH.          Passed - No positive pregnancy test in past 12 months     If patient is pregnant or has had a positive pregnancy test, please check TSH.               Giovanna Benjamin 01/06/22 11:39 AM  "

## 2022-02-07 ENCOUNTER — LAB (OUTPATIENT)
Dept: LAB | Facility: CLINIC | Age: 72
End: 2022-02-07
Payer: COMMERCIAL

## 2022-02-07 DIAGNOSIS — E78.00 HYPERCHOLESTEROLEMIA: ICD-10-CM

## 2022-02-07 DIAGNOSIS — E55.9 VITAMIN D DEFICIENCY: ICD-10-CM

## 2022-02-07 DIAGNOSIS — E03.9 ACQUIRED HYPOTHYROIDISM: ICD-10-CM

## 2022-02-07 DIAGNOSIS — Z00.00 ENCOUNTER FOR PREVENTIVE CARE: ICD-10-CM

## 2022-02-07 LAB
ALBUMIN SERPL-MCNC: 4.2 G/DL (ref 3.5–5)
ALP SERPL-CCNC: 46 U/L (ref 45–120)
ALT SERPL W P-5'-P-CCNC: 24 U/L (ref 0–45)
ANION GAP SERPL CALCULATED.3IONS-SCNC: 12 MMOL/L (ref 5–18)
AST SERPL W P-5'-P-CCNC: 24 U/L (ref 0–40)
BASOPHILS # BLD AUTO: 0 10E3/UL (ref 0–0.2)
BASOPHILS NFR BLD AUTO: 0 %
BILIRUB SERPL-MCNC: 0.6 MG/DL (ref 0–1)
BUN SERPL-MCNC: 14 MG/DL (ref 8–28)
CALCIUM SERPL-MCNC: 9.1 MG/DL (ref 8.5–10.5)
CHLORIDE BLD-SCNC: 105 MMOL/L (ref 98–107)
CHOLEST SERPL-MCNC: 160 MG/DL
CO2 SERPL-SCNC: 22 MMOL/L (ref 22–31)
CREAT SERPL-MCNC: 0.67 MG/DL (ref 0.6–1.1)
EOSINOPHIL # BLD AUTO: 0.1 10E3/UL (ref 0–0.7)
EOSINOPHIL NFR BLD AUTO: 2 %
ERYTHROCYTE [DISTWIDTH] IN BLOOD BY AUTOMATED COUNT: 11.8 % (ref 10–15)
FASTING STATUS PATIENT QL REPORTED: YES
GFR SERPL CREATININE-BSD FRML MDRD: >90 ML/MIN/1.73M2
GLUCOSE BLD-MCNC: 106 MG/DL (ref 70–125)
HBA1C MFR BLD: 5.7 % (ref 0–5.6)
HCT VFR BLD AUTO: 40.7 % (ref 35–47)
HDLC SERPL-MCNC: 55 MG/DL
HGB BLD-MCNC: 13.4 G/DL (ref 11.7–15.7)
IMM GRANULOCYTES # BLD: 0 10E3/UL
IMM GRANULOCYTES NFR BLD: 0 %
LDLC SERPL CALC-MCNC: 66 MG/DL
LYMPHOCYTES # BLD AUTO: 2.2 10E3/UL (ref 0.8–5.3)
LYMPHOCYTES NFR BLD AUTO: 44 %
MCH RBC QN AUTO: 32 PG (ref 26.5–33)
MCHC RBC AUTO-ENTMCNC: 32.9 G/DL (ref 31.5–36.5)
MCV RBC AUTO: 97 FL (ref 78–100)
MONOCYTES # BLD AUTO: 0.5 10E3/UL (ref 0–1.3)
MONOCYTES NFR BLD AUTO: 9 %
NEUTROPHILS # BLD AUTO: 2.2 10E3/UL (ref 1.6–8.3)
NEUTROPHILS NFR BLD AUTO: 44 %
PLATELET # BLD AUTO: 181 10E3/UL (ref 150–450)
POTASSIUM BLD-SCNC: 4.5 MMOL/L (ref 3.5–5)
PROT SERPL-MCNC: 6.6 G/DL (ref 6–8)
RBC # BLD AUTO: 4.19 10E6/UL (ref 3.8–5.2)
SODIUM SERPL-SCNC: 139 MMOL/L (ref 136–145)
T4 FREE SERPL-MCNC: 1.34 NG/DL (ref 0.7–1.8)
TRIGL SERPL-MCNC: 195 MG/DL
TSH SERPL DL<=0.005 MIU/L-ACNC: 0.22 UIU/ML (ref 0.3–5)
WBC # BLD AUTO: 4.9 10E3/UL (ref 4–11)

## 2022-02-07 PROCEDURE — 85025 COMPLETE CBC W/AUTO DIFF WBC: CPT

## 2022-02-07 PROCEDURE — 82306 VITAMIN D 25 HYDROXY: CPT

## 2022-02-07 PROCEDURE — 36415 COLL VENOUS BLD VENIPUNCTURE: CPT

## 2022-02-07 PROCEDURE — 84439 ASSAY OF FREE THYROXINE: CPT

## 2022-02-07 PROCEDURE — 84443 ASSAY THYROID STIM HORMONE: CPT

## 2022-02-07 PROCEDURE — 80053 COMPREHEN METABOLIC PANEL: CPT

## 2022-02-07 PROCEDURE — 80061 LIPID PANEL: CPT

## 2022-02-07 PROCEDURE — 2894A HEMOGLOBIN A1C: CPT

## 2022-02-08 ENCOUNTER — TELEPHONE (OUTPATIENT)
Dept: INTERNAL MEDICINE | Facility: CLINIC | Age: 72
End: 2022-02-08
Payer: COMMERCIAL

## 2022-02-08 DIAGNOSIS — I10 ESSENTIAL HYPERTENSION: ICD-10-CM

## 2022-02-08 LAB — DEPRECATED CALCIDIOL+CALCIFEROL SERPL-MC: 61 UG/L (ref 30–80)

## 2022-02-08 RX ORDER — LEVOTHYROXINE SODIUM 88 UG/1
88 TABLET ORAL DAILY
Qty: 90 TABLET | Refills: 3 | Status: SHIPPED | OUTPATIENT
Start: 2022-02-08 | End: 2022-02-11

## 2022-02-11 ENCOUNTER — OFFICE VISIT (OUTPATIENT)
Dept: INTERNAL MEDICINE | Facility: CLINIC | Age: 72
End: 2022-02-11
Payer: COMMERCIAL

## 2022-02-11 VITALS
OXYGEN SATURATION: 98 % | HEART RATE: 64 BPM | HEIGHT: 61 IN | DIASTOLIC BLOOD PRESSURE: 70 MMHG | BODY MASS INDEX: 29.47 KG/M2 | SYSTOLIC BLOOD PRESSURE: 130 MMHG | WEIGHT: 156.1 LBS

## 2022-02-11 DIAGNOSIS — E78.00 HYPERCHOLESTEROLEMIA: ICD-10-CM

## 2022-02-11 DIAGNOSIS — E78.1 HYPERTRIGLYCERIDEMIA: ICD-10-CM

## 2022-02-11 DIAGNOSIS — M79.661 PAIN IN BOTH LOWER LEGS: Primary | ICD-10-CM

## 2022-02-11 DIAGNOSIS — N95.2 ATROPHIC VAGINITIS: ICD-10-CM

## 2022-02-11 DIAGNOSIS — M79.662 PAIN IN BOTH LOWER LEGS: Primary | ICD-10-CM

## 2022-02-11 DIAGNOSIS — E03.9 ACQUIRED HYPOTHYROIDISM: ICD-10-CM

## 2022-02-11 DIAGNOSIS — R79.9 ABNORMAL FINDING OF BLOOD CHEMISTRY, UNSPECIFIED: ICD-10-CM

## 2022-02-11 DIAGNOSIS — M25.50 ARTHRALGIA, UNSPECIFIED JOINT: ICD-10-CM

## 2022-02-11 DIAGNOSIS — I10 ESSENTIAL HYPERTENSION: ICD-10-CM

## 2022-02-11 DIAGNOSIS — N39.46 MIXED STRESS AND URGE URINARY INCONTINENCE: ICD-10-CM

## 2022-02-11 LAB
ALBUMIN UR-MCNC: NEGATIVE MG/DL
APPEARANCE UR: CLEAR
BACTERIA #/AREA URNS HPF: ABNORMAL /HPF
BILIRUB UR QL STRIP: NEGATIVE
COLOR UR AUTO: YELLOW
GLUCOSE UR STRIP-MCNC: NEGATIVE MG/DL
HGB UR QL STRIP: ABNORMAL
KETONES UR STRIP-MCNC: NEGATIVE MG/DL
LEUKOCYTE ESTERASE UR QL STRIP: NEGATIVE
NITRATE UR QL: NEGATIVE
PH UR STRIP: 6 [PH] (ref 5–8)
RBC #/AREA URNS AUTO: ABNORMAL /HPF
SP GR UR STRIP: 1.01 (ref 1–1.03)
SQUAMOUS #/AREA URNS AUTO: ABNORMAL /LPF
UROBILINOGEN UR STRIP-ACNC: 0.2 E.U./DL
WBC #/AREA URNS AUTO: ABNORMAL /HPF

## 2022-02-11 PROCEDURE — 81001 URINALYSIS AUTO W/SCOPE: CPT | Performed by: INTERNAL MEDICINE

## 2022-02-11 PROCEDURE — 99214 OFFICE O/P EST MOD 30 MIN: CPT | Performed by: INTERNAL MEDICINE

## 2022-02-11 RX ORDER — GABAPENTIN 300 MG/1
300 CAPSULE ORAL AT BEDTIME
Qty: 90 CAPSULE | Refills: 3 | Status: SHIPPED | OUTPATIENT
Start: 2022-02-11 | End: 2023-06-12

## 2022-02-11 RX ORDER — MULTIVITAMIN,THERAPEUTIC
1 TABLET ORAL DAILY
COMMUNITY

## 2022-02-11 RX ORDER — MULTIVIT-MIN/IRON/FOLIC ACID/K 18-600-40
1 CAPSULE ORAL DAILY
COMMUNITY

## 2022-02-11 RX ORDER — ROSUVASTATIN CALCIUM 40 MG/1
40 TABLET, COATED ORAL DAILY
Qty: 90 TABLET | Refills: 1 | Status: SHIPPED | OUTPATIENT
Start: 2022-02-11 | End: 2022-06-17

## 2022-02-11 RX ORDER — ESTRADIOL 0.1 MG/G
1 CREAM VAGINAL
Qty: 42.5 G | Refills: 3 | Status: SHIPPED | OUTPATIENT
Start: 2022-02-14 | End: 2022-11-23

## 2022-02-11 RX ORDER — LISINOPRIL 10 MG/1
10 TABLET ORAL DAILY
Qty: 90 TABLET | Refills: 2 | Status: SHIPPED | OUTPATIENT
Start: 2022-02-11 | End: 2022-11-20

## 2022-02-11 RX ORDER — METOPROLOL SUCCINATE 50 MG/1
50 TABLET, EXTENDED RELEASE ORAL DAILY
Qty: 90 TABLET | Refills: 2 | Status: SHIPPED | OUTPATIENT
Start: 2022-02-11 | End: 2022-11-20

## 2022-02-11 RX ORDER — LEVOTHYROXINE SODIUM 88 UG/1
88 TABLET ORAL DAILY
Qty: 90 TABLET | Refills: 3 | Status: SHIPPED | OUTPATIENT
Start: 2022-02-11 | End: 2023-04-27

## 2022-02-11 ASSESSMENT — MIFFLIN-ST. JEOR: SCORE: 1142.5

## 2022-02-11 ASSESSMENT — ACTIVITIES OF DAILY LIVING (ADL): CURRENT_FUNCTION: NO ASSISTANCE NEEDED

## 2022-02-11 NOTE — PROGRESS NOTES
[facility] Follow Up Note    Assessment/Plan:  1. Pain in both lower legs  Nocturnal leg discomfort-worries about circulation but no claudication-skin color changes or swelling.  Recommendation: Continue to monitor    2. Atrophic vaginitis  Will renew prescriptions  - estradiol (ESTRACE) 0.1 MG/GM vaginal cream; Place 1 g vaginally twice a week  Dispense: 42.5 g; Refill: 3    3. Essential hypertension  Stable on current medications      4. Acquired hypothyroidism  Recent med change due to low TSH.  Recheck in 2 to 3 months.  Orders placed    - TSH with free T4 reflex; Future    5. Hypercholesterolemia  Renew  - rosuvastatin (CRESTOR) 40 MG tablet; Take 1 tablet (40 mg) by mouth daily  Dispense: 90 tablet; Refill: 1    6. Arthralgia, unspecified joint  Renewed  - gabapentin (NEURONTIN) 300 MG capsule; Take 1 capsule (300 mg) by mouth At Bedtime  Dispense: 90 capsule; Refill: 3    7. Mixed stress and urge urinary incontinence  Mixed stress and urinary urgency.  Has seen urology remotely.  Will check urine analysis at her convenience and refer to pelvic floor therapy and strengthening  - UA Macro with Reflex to Micro and Culture - lab collect; Future  - Physical Therapy Referral; Future    8. Hypertriglyceridemia  Have discussed the importance of reducing triglycerides with starch and sweet reduction.  If she uses alcohol daily, this should be reduced as well.    We will screen for diabetes with A1c at next visit  - Hemoglobin A1c; Future    9. Abnormal finding of blood chemistry, unspecified   Recheck A1c  - Hemoglobin A1c; Future          Kayley Johnson MD, MD    Chief Complaint:  Chief Complaint   Patient presents with     Follow Up     Circulation Problems     bilateral lower extremities cold sensation at end of day       History of Present Illness:  Xiomy is a 71 year old female who was originally scheduled for a wellness visit-but it is 2 weeks early.  Of note, she is currently up-to-date on  all preventative health issues.    She had annual laboratory studies which are reviewed today.    She would like to discuss circulation issues.  She states that late in the day, she notes a bilateral lower extremity cold sensation.  It is not associated with claudication or swelling.  She states she is able to go for walks, swims at the pool/etc. with no leg discomfort whatsoever.  The sensation is internal.  She notes no external skin color changes/change of color/etc.  She has no lower extremity edema.    She does have carotid artery disease and is on lipid-lowering therapies as well as aspirin.  She denies any chest pain or shortness of breath.    Her review of systems is significant for symptoms consistent with both mixed and stress incontinence.  She has seen urology remotely.  She has not followed through with physical therapy and would be interested in doing so at this juncture.    There is no new family history.  She is otherwise well    Review of Systems:  A comprehensive review of systems was performed and was otherwise negative    PFSH:  Social History:   History   Smoking Status     Former Smoker     Packs/day: 0.50     Years: 20.00     Quit date: 1/1/1985   Smokeless Tobacco     Never Used       Past History:   Past Medical History:   Diagnosis Date     Arthralgia      Breast cyst y-10     Carotid artery stenosis      Cholelithiasis      Chondrocalcinosis      Coronary artery disease      Hyperlipidemia      Hypertension      Hypothyroidism      Myocardial infarction (H)      Nocturia      Osteoarthrosis      Osteopenia      Palpitations 10/5/2017     Vitamin D deficiency        Current Outpatient Medications   Medication Sig Dispense Refill     acetaminophen (TYLENOL) 500 MG tablet Take 1,000 mg by mouth 3 times daily        ASPIRIN (ASPIR-81 ORAL) [ASPIRIN (ASPIR-81 ORAL)] Take 1 tablet by mouth daily.       calcium citrate-vitamin D (CITRACAL) 315-200 MG-UNIT TABS per tablet Take 1 tablet by mouth  "daily       [START ON 2/14/2022] estradiol (ESTRACE) 0.1 MG/GM vaginal cream Place 1 g vaginally twice a week 42.5 g 3     famotidine (PEPCID) 20 MG tablet [FAMOTIDINE (PEPCID) 20 MG TABLET] Take 20 mg by mouth 2 (two) times a day as needed for heartburn.       gabapentin (NEURONTIN) 300 MG capsule Take 1 capsule (300 mg) by mouth At Bedtime 90 capsule 3     levothyroxine (SYNTHROID/LEVOTHROID) 88 MCG tablet Take 1 tablet (88 mcg) by mouth daily 90 tablet 3     lisinopril (ZESTRIL) 10 MG tablet Take 1 tablet (10 mg) by mouth daily 90 tablet 2     metoprolol succinate ER (TOPROL-XL) 50 MG 24 hr tablet Take 1 tablet (50 mg) by mouth daily 90 tablet 2     multivitamin, therapeutic (THERA-VIT) TABS tablet Take 1 tablet by mouth daily       rosuvastatin (CRESTOR) 40 MG tablet Take 1 tablet (40 mg) by mouth daily 90 tablet 1     traMADol (ULTRAM) 50 MG tablet TAKE 1 TABLET BY MOUTH EVERY 6 HOURS AS NEEDED FOR PAIN 20 tablet 0     Vitamin D, Cholecalciferol, 25 MCG (1000 UT) TABS Take 1 tablet by mouth daily       celecoxib (CELEBREX) 100 MG capsule [CELECOXIB (CELEBREX) 100 MG CAPSULE] Take 1 capsule (100 mg total) by mouth 2 (two) times a day. (Patient not taking: Reported on 2/11/2022) 180 capsule 2     estradioL (ESTRACE) 0.01 % (0.1 mg/gram) vaginal cream [ESTRADIOL (ESTRACE) 0.01 % (0.1 MG/GRAM) VAGINAL CREAM] INSERT 1 GRAM INTO THE VAGINA DAILY. ONE GRAM EVERY MON, WED, FRIDAY 42.5 g 1       Family History:     Physical Exam:  General Appearance:   She appears quite well and in no acute distress  Vitals:    02/11/22 0658   BP: 130/70   BP Location: Left arm   Patient Position: Sitting   Cuff Size: Adult Regular   Pulse: 64   SpO2: 98%   Weight: 70.8 kg (156 lb 1.6 oz)   Height: 1.537 m (5' 0.5\")     Wt Readings from Last 3 Encounters:   02/11/22 70.8 kg (156 lb 1.6 oz)   02/26/21 71 kg (156 lb 9 oz)   08/14/20 69.9 kg (154 lb)     Body mass index is 29.98 kg/m .    EYES: Eyelids, conjunctiva, and sclera were normal. " "Pupils were normal. Cornea, iris, and lens were normal bilaterally.  HEAD, EARS, NOSE, MOUTH, AND THROAT: Head and face were normal. Hearing was normal to voice and the ears were normal to external exam. Nose appearance was normal and there was no discharge. Oropharynx was normal.  TMs were normal.  NECK: Neck appearance was normal. There were no neck masses and the thyroid was not enlarged.  RESPIRATORY: Breathing pattern was normal and the chest moved symmetrically.   Lung sounds were equal bilaterally.  CARDIOVASCULAR: Heart rate and rhythm were normal.  S1 and S2 were normal and there were no extra sounds or murmurs. Peripheral pulses in arms and legs were normal.  Jugular venous pressure was normal.  There was no peripheral edema.  GASTROINTESTINAL: The abdomen was normal in contour.  Bowel sounds were present.   Palpation detected no tenderness, mass, or enlarged organs.   MUSCULOSKELETAL: Skeletal configuration was normal and muscle mass was normal for age. Joint appearance was overall normal.  LYMPHATIC: There were no enlarged nodes.  SKIN/HAIR/NAILS: Skin color was normal.  There were no abnormal skin lesions.  Hair and nails were normal.  NEUROLOGIC: The patient was alert and oriented to person, place, time, and circumstance. Speech was normal. Cranial nerves were normal. Motor strength was normal for age. The patient was normally coordinated.  PSYCHIATRIC:  Mood and affect were normal and the patient had normal recent and remote memory. The patient's judgment and insight were normal.          *  Answers for HPI/ROS submitted by the patient on 2/11/2022  In general, how would you rate your overall physical health?: good  Frequency of exercise:: 4-5 days/week  Do you usually eat at least 4 servings of fruit and vegetables a day, include whole grains & fiber, and avoid regularly eating high fat or \"junk\" foods? : Yes  Taking medications regularly:: Yes  Medication side effects:: None  Activities of Daily " Living: no assistance needed  Home safety: no safety concerns identified  Hearing Impairment:: need to ask people to speak up or repeat themselves  In the past 6 months, have you been bothered by leaking of urine?: Yes  In general, how would you rate your overall mental or emotional health?: excellent  Additional concerns today:: Yes  Duration of exercise:: 30-45 minutes

## 2022-02-15 RX ORDER — ESTRADIOL 0.1 MG/G
CREAM VAGINAL
Qty: 42.5 G | Refills: 1 | OUTPATIENT
Start: 2022-02-15

## 2022-02-15 NOTE — TELEPHONE ENCOUNTER
Medication refill request declined: should have refills on file  Sent by provider     Disp Refills Start End OZ   estradiol (ESTRACE) 0.1 MG/GM vaginal cream 42.5 g 3 2/14/2022  No   Sig - Route: Place 1 g vaginally twice a week - Vaginal   Sent to pharmacy as: Estradiol 0.1 MG/GM Vaginal Cream (ESTRACE)   Class: E-Prescribe   Order: [id number]   E-Prescribing Status: Receipt confirmed by pharmacy (2/11/2022  7:04 AM CST)     Rylee Reno RN BSN 2/15/2022 8:17 AM

## 2022-02-17 DIAGNOSIS — M15.0 PRIMARY OSTEOARTHRITIS INVOLVING MULTIPLE JOINTS: ICD-10-CM

## 2022-02-18 RX ORDER — TRAMADOL HYDROCHLORIDE 50 MG/1
TABLET ORAL
Qty: 20 TABLET | Refills: 0 | Status: SHIPPED | OUTPATIENT
Start: 2022-02-18 | End: 2022-05-31

## 2022-02-18 NOTE — TELEPHONE ENCOUNTER
Patient requests if this refill for tramadol can be expedited and sent to pharmacy today, as they are leaving on Monday for a trip. She has 3 tablets left.    Last office visit: 2/11/22  Last filled 12/1/21 Qty 20 tabs, 0 refills  Pharmacy: [id number] - [place], [place] - [address]    Sending to provider for approval.  FNA unable to approve per [facility] protocol: controlled substance    Kindly call pt ar [phone] once script sent to pharmacy.    Petty Granado RN/[place] Nurse Advisor

## 2022-02-18 NOTE — TELEPHONE ENCOUNTER
Medication: tramadol 50 mg      CSA in last year: NO    Random Utox in last year: NO      On opioids in addition to benzodiazepines? NO            PROVIDER TO PULL THE FOLLOWING FROM  :    1. Last date filled?  2.   Only PCP Prescribing?  3.   Taken as prescribed from physician notes?

## 2022-03-15 ENCOUNTER — THERAPY VISIT (OUTPATIENT)
Dept: PHYSICAL THERAPY | Facility: CLINIC | Age: 72
End: 2022-03-15
Attending: INTERNAL MEDICINE
Payer: COMMERCIAL

## 2022-03-15 DIAGNOSIS — M62.89 PELVIC FLOOR DYSFUNCTION: ICD-10-CM

## 2022-03-15 DIAGNOSIS — N39.46 MIXED STRESS AND URGE URINARY INCONTINENCE: ICD-10-CM

## 2022-03-15 PROCEDURE — 97161 PT EVAL LOW COMPLEX 20 MIN: CPT | Mod: GP | Performed by: PHYSICAL THERAPIST

## 2022-03-15 PROCEDURE — 97140 MANUAL THERAPY 1/> REGIONS: CPT | Mod: GP | Performed by: PHYSICAL THERAPIST

## 2022-03-15 PROCEDURE — 97112 NEUROMUSCULAR REEDUCATION: CPT | Mod: GP | Performed by: PHYSICAL THERAPIST

## 2022-03-15 PROCEDURE — 97530 THERAPEUTIC ACTIVITIES: CPT | Mod: GP | Performed by: PHYSICAL THERAPIST

## 2022-03-15 NOTE — PROGRESS NOTES
Physical Therapy Initial Evaluation  Subjective:  The history is provided by the patient. No  was used.   Patient Health History  Xiomy Gan being seen for Incontinence.          Pain is reported as 5/10 on pain scale.  General health as reported by patient is good.  Pertinent medical history includes: changes in bowel/bladder, chest pain, heart problems, high blood pressure and thyroid problems.     Medical allergies: none.   Surgeries include:  Orthopedic surgery, heart surgery and other. Other surgery history details: Tubal and lap Cindy.    Current medications:  Anti-inflammatory, cardiac medication, high blood pressure medication, muscle relaxants, pain medication, thyroid medication and other. Other medications details: Cholesterol lowering , hormone , Mvi and supplements.       Primary job tasks include:  Lifting/carrying, prolonged sitting, prolonged standing and repetitive tasks.                   History of current episode:    Onset date/MD order: Mixed stress and urge urinary incontinence   CC/Present symptoms: Patient reports that she has urinary incontinence that gradually worsened about 3-4 years ago and is now consistent.  No changes  3-4 years ago, but can remember some urinary incontinence happening even before that with exercise.  Urinary leakage is occurring with increased IAP (sneeze, cough, etc) and with strong urge.  Drinking decaf tea makes it worse, has to urinate right away and lasts throughout the day.  She leaks more often with urgency, usually leaks on way to bathroom about 2-3 times per day.  Leaking with coughing and sneezing is occassional, worse with drinking a lot, about once every 2-3 weeks.  Pt reports that she is on estrogen cream and has been on for 3-4 years (does 2x/week).    HPI/SMHx/Childbirth hx::.  Children are living at aged 42-52.  All C-sections due to first child having large child.  No issues with incontinence then.  Hx of MI 15 years  ago.  Pt had some vaginal bleeding last year and had D&C.  No significant findings, and she did cut back on estrogen and has not had any issues.    Pain rating (0-10): 5/10 in other areas of the body, not in pelvic area.  She has psuedo-gout.    Conditioning is improving/unchanging/worsening: unchanging    Hx of or present sexually transmitted disease:None  Current occupation: retired RN, lots of ortho lifting patients  Current activity: swimming (no issues with incontinence),  Golf, walking 4-5x/week  Goals: Pelvic control and less incontinence   Red flags:none    Urination:  Do you leak on the way to the bathroom or with a strong urge to void? yes   Do you leak with cough,sneeze, jumping, running? yes  Any other activities that cause leaking?  no  Do you have triggers that make you feel you can't wait to go to the bathroom?  What are they? Full bladder.  Type of pad and number used per day? Not every day, 1-2x/week 1-2 pads  When you leak what is the amount? medium  How long can you delay the need to urinate? Maybe 1/2 hour.   Do you feel excessive pressure in pelvic floor:no.  When?   Frequency of daytime urination:not tested  Frequency of nighttime urination:1-2  Can you stop the flow of urine when on the toilet? yes  Is the volume of urine passed usually:  (8sec rule= 250ml with average bladder storing 400-600ml) small-medium  Do you strain to pass urine? yes  Do you have a slow or hesitant urinary stream? no  Do you have difficulty initiating the urine stream? no  Is urination painful? No  Do you empty bladder fully? yes  How many bladder infections have you had in last 12 months?0  Fluid intake(one glass is 8oz or one cup) 3+ glasses/day, 2-3 caffinated glasses/day  1 alcohol glasses/day.  Bowel habits:  Frequency of bowel movements? 1-2 times a day  Consistancy of stool?  [place] Stool Scale 3-4  Do you ignore the urge to defecate? no  Do you strain to pass stool? Sometimes, depends on what she has eaten    Pelvic Pain:  Do you have any pelvic pain with intercourse, exams, use of tampons? I don't  Is initial penetration during intercourse painful? n/a  Is deeper penetration painful? n/a  Do you use lubricant?  n/a  Are you sexually active? no  Have you ever been worried for your physical safety? no  Have you practiced the PF(kegel) exercises for 4 or more weeks?no  Marinoff Scale:Level n/a  (Level 3: Abstinence from intercourse because of severe pain. Level 2: Painful intercourse which limites frequency of activity. Level 1: Painful intercourse not severe enough to prevent activity.)    OBJECTIVE:      Treatment/Education provided this session: please see flow sheet for details    Objective:  System         Lumbar/SI Evaluation  ROM:  AROM Lumbar: normal                                                Pelvic Dysfunction Evaluation:        Flexibility:    Tightness present at:Hamstrings and Piriformis  Tightness not present at:  Adductors    Abdominal Wall:    Diastasis Recti:  Normal  Trigger Points:  NA    Pelvic Clock Exam:    Ischiocavernosis pain:  -  Bulbocavernosis pain:  -  Transverse Perineal:  -  Levator ANI:  ++        External Assessment:    Skin Condition:  Normal    Bearing Down/Coughing:  Normal  Tissue Symmetry:  Normal  Introitus:  Normal  Muscle Contraction/Perineal Mobility:  Slight lift, no urogential triangle descent  Internal Assessment:  Internal assessment pelvic: good muscle recruitment, difficult to coordinate PFM relaxation.  Sensory Exam:  Normal  Contraction/Grade:  Weak squeeze, 2 second hold (2)                    Hip Evaluation  HIP AROM:  AROM:    Left Hip:     Normal    Right Hip:   Normal                                     General     ROS      ASSESSMENT/PLAN:    Patient is a 71 year old female with pelvic floor complaints.    Patient has the following significant findings with corresponding treatment plan.                Diagnosis 1:  Mixed urinary incontinence  -  manual therapy,  self management, education, directional preference exercise and home program  Decreased ROM/flexibility - manual therapy and therapeutic exercise  Decreased joint mobility - manual therapy and therapeutic exercise  Decreased strength - therapeutic exercise and therapeutic activities  Decreased proprioception - neuro re-education and therapeutic activities  Impaired gait - gait training  Impaired muscle performance - neuro re-education  Decreased function - therapeutic activities  Impaired posture - neuro re-education    Previous and current functional limitations:  (See Goal Flow Sheet for this information)    Short term and Long term goals: (See Goal Flow Sheet for this information)     Communication ability:  Patient appears to be able to clearly communicate and understand verbal and written communication and follow directions correctly.  Treatment Explanation - The following has been discussed with the patient:   RX ordered/plan of care  Anticipated outcomes  Possible risks and side effects  This patient would benefit from PT intervention to resume normal activities.   Rehab potential is good.    Frequency:  1X week, once daily  Duration:  for 8 weeks  Discharge Plan:  Achieve all LTG.  Independent in home treatment program.  Reach maximal therapeutic benefit.    Please refer to the daily flowsheet for treatment today, total treatment time and time spent performing 1:1 timed codes.

## 2022-03-17 PROBLEM — M62.89 PELVIC FLOOR DYSFUNCTION: Status: ACTIVE | Noted: 2022-03-17

## 2022-03-17 PROBLEM — N39.46 MIXED STRESS AND URGE URINARY INCONTINENCE: Status: ACTIVE | Noted: 2022-03-17

## 2022-03-18 NOTE — PROGRESS NOTES
[facility]    OUTPATIENT Physical Therapy ORTHOPEDIC EVALUATION  PLAN OF TREATMENT FOR OUTPATIENT REHABILITATION  (COMPLETE FOR INITIAL CLAIMS ONLY)  Patient's Last Name, First Name, M.I.  YOB: 1950  Xiomy Gan    Provider s Name:  [facility]   Medical Record No.  [id number]   Start of Care Date:  03/15/22   Onset Date:   02/11/22   Type:     _X__PT   ___OT Medical Diagnosis:    Encounter Diagnoses   Name Primary?     Mixed stress and urge urinary incontinence      Pelvic floor dysfunction         Treatment Diagnosis:  TASHIA        Goals:     03/15/22 0500   Urinary Leakage   Previous Functional Level No problems   Current Functional Level Number of urinary leakage episodes in a day   Performance Level 3 times   STG Target Performance Decrease urinary leakage episodes in a week to   Performance Level 1   Rationale for healthy hygiene and to prevent skin breakdown   Due Date 04/12/22   LTG Target Performance Decrease urinary leakage episodes in a month to   Performance Level 1   Rationale for healthy hygiene and to prevent skin breakdown   Due Date 05/10/22       Therapy Frequency:  1x/week  Predicted Duration of Therapy Intervention:  8 weeks    Kaleigh Robb, PT                 I CERTIFY THE NEED FOR THESE SERVICES FURNISHED UNDER        THIS PLAN OF TREATMENT AND WHILE UNDER MY CARE     (Physician attestation of this document indicates review and certification of the therapy plan).                       Certification Date From:  03/15/22   Certification Date To:  05/10/22    Referring Provider:  Kayley Johnson    Initial Assessment        See Epic Evaluation SOC Date: 03/15/22

## 2022-04-02 ENCOUNTER — HEALTH MAINTENANCE LETTER (OUTPATIENT)
Age: 72
End: 2022-04-02

## 2022-04-18 ENCOUNTER — LAB (OUTPATIENT)
Dept: LAB | Facility: CLINIC | Age: 72
End: 2022-04-18

## 2022-04-18 DIAGNOSIS — E78.1 HYPERTRIGLYCERIDEMIA: ICD-10-CM

## 2022-04-18 DIAGNOSIS — R79.9 ABNORMAL FINDING OF BLOOD CHEMISTRY, UNSPECIFIED: ICD-10-CM

## 2022-04-18 DIAGNOSIS — E03.9 ACQUIRED HYPOTHYROIDISM: ICD-10-CM

## 2022-04-18 LAB
ANION GAP SERPL CALCULATED.3IONS-SCNC: 11 MMOL/L (ref 5–18)
BUN SERPL-MCNC: 17 MG/DL (ref 8–28)
CALCIUM SERPL-MCNC: 9.1 MG/DL (ref 8.5–10.5)
CHLORIDE BLD-SCNC: 105 MMOL/L (ref 98–107)
CO2 SERPL-SCNC: 25 MMOL/L (ref 22–31)
CREAT SERPL-MCNC: 0.76 MG/DL (ref 0.6–1.1)
GFR SERPL CREATININE-BSD FRML MDRD: 82 ML/MIN/1.73M2
GLUCOSE BLD-MCNC: 141 MG/DL (ref 70–125)
HBA1C MFR BLD: 5.6 % (ref 0–5.6)
POTASSIUM BLD-SCNC: 4.2 MMOL/L (ref 3.5–5)
SODIUM SERPL-SCNC: 141 MMOL/L (ref 136–145)
TSH SERPL DL<=0.005 MIU/L-ACNC: 1.33 UIU/ML (ref 0.3–5)

## 2022-04-18 PROCEDURE — 80048 BASIC METABOLIC PNL TOTAL CA: CPT

## 2022-04-18 PROCEDURE — 36415 COLL VENOUS BLD VENIPUNCTURE: CPT

## 2022-04-18 PROCEDURE — 83036 HEMOGLOBIN GLYCOSYLATED A1C: CPT

## 2022-04-18 PROCEDURE — 84443 ASSAY THYROID STIM HORMONE: CPT

## 2022-05-04 ENCOUNTER — TRANSFERRED RECORDS (OUTPATIENT)
Dept: HEALTH INFORMATION MANAGEMENT | Facility: CLINIC | Age: 72
End: 2022-05-04
Payer: COMMERCIAL

## 2022-05-16 ENCOUNTER — TRANSFERRED RECORDS (OUTPATIENT)
Dept: HEALTH INFORMATION MANAGEMENT | Facility: CLINIC | Age: 72
End: 2022-05-16
Payer: COMMERCIAL

## 2022-05-28 DIAGNOSIS — M15.0 PRIMARY OSTEOARTHRITIS INVOLVING MULTIPLE JOINTS: ICD-10-CM

## 2022-05-30 NOTE — TELEPHONE ENCOUNTER
Routing refill request to provider for review/approval because:  Drug not on the [facility] refill protocol     Last Written Prescription Date:  2/18/22  Last Fill Quantity: 20,  # refills: 0   Last office visit provider:  2/11/22     Requested Prescriptions   Pending Prescriptions Disp Refills     traMADol (ULTRAM) 50 MG tablet [Pharmacy Med Name: TRAMADOL HCL 50 MG TABLET] 20 tablet 0     Sig: TAKE 1 TABLET BY MOUTH EVERY 6 HOURS AS NEEDED FOR PAIN       There is no refill protocol information for this order          Ginna Adams, RN 05/30/22 5:34 PM

## 2022-05-31 RX ORDER — TRAMADOL HYDROCHLORIDE 50 MG/1
TABLET ORAL
Qty: 20 TABLET | Refills: 0 | Status: SHIPPED | OUTPATIENT
Start: 2022-05-31 | End: 2022-08-04

## 2022-06-03 ENCOUNTER — TRANSFERRED RECORDS (OUTPATIENT)
Dept: HEALTH INFORMATION MANAGEMENT | Facility: CLINIC | Age: 72
End: 2022-06-03
Payer: COMMERCIAL

## 2022-06-17 ENCOUNTER — OFFICE VISIT (OUTPATIENT)
Dept: INTERNAL MEDICINE | Facility: CLINIC | Age: 72
End: 2022-06-17
Payer: COMMERCIAL

## 2022-06-17 VITALS
SYSTOLIC BLOOD PRESSURE: 130 MMHG | WEIGHT: 156.9 LBS | HEIGHT: 61 IN | DIASTOLIC BLOOD PRESSURE: 72 MMHG | HEART RATE: 66 BPM | BODY MASS INDEX: 29.62 KG/M2 | OXYGEN SATURATION: 97 %

## 2022-06-17 DIAGNOSIS — Z01.818 PREOP GENERAL PHYSICAL EXAM: Primary | ICD-10-CM

## 2022-06-17 DIAGNOSIS — E03.9 ACQUIRED HYPOTHYROIDISM: ICD-10-CM

## 2022-06-17 DIAGNOSIS — I10 ESSENTIAL HYPERTENSION: ICD-10-CM

## 2022-06-17 DIAGNOSIS — I77.9 RIGHT-SIDED CAROTID ARTERY DISEASE, UNSPECIFIED TYPE (H): ICD-10-CM

## 2022-06-17 DIAGNOSIS — E78.00 HYPERCHOLESTEROLEMIA: ICD-10-CM

## 2022-06-17 DIAGNOSIS — I25.83 CORONARY ATHEROSCLEROSIS DUE TO LIPID RICH PLAQUE: ICD-10-CM

## 2022-06-17 LAB — TSH SERPL DL<=0.005 MIU/L-ACNC: 2.47 UIU/ML (ref 0.3–5)

## 2022-06-17 PROCEDURE — 84443 ASSAY THYROID STIM HORMONE: CPT | Performed by: INTERNAL MEDICINE

## 2022-06-17 PROCEDURE — 36415 COLL VENOUS BLD VENIPUNCTURE: CPT | Performed by: INTERNAL MEDICINE

## 2022-06-17 PROCEDURE — 99214 OFFICE O/P EST MOD 30 MIN: CPT | Performed by: INTERNAL MEDICINE

## 2022-06-17 RX ORDER — ROSUVASTATIN CALCIUM 40 MG/1
40 TABLET, COATED ORAL DAILY
Qty: 90 TABLET | Refills: 3 | Status: SHIPPED | OUTPATIENT
Start: 2022-06-17 | End: 2023-09-15

## 2022-06-17 NOTE — PROGRESS NOTES
[facility]  [address] [place] [address]  Phone: [phone]  Fax: [phone]  Primary Provider: Kayley Johnson  :[id number]}  PREOPERATIVE EVALUATION:  Today's date: 6/17/2022    Xiomy Gan is a 71 year old female who presents for a preoperative evaluation.    Surgical Information:  Surgery/Procedure: Cataract, Left Eye  Surgery Location: [facility]  Surgeon: Dr. Cohen  Surgery Date: 6/29/22  Where patient plans to recover: At home with family  Fax number for surgical facility: [phone]    Type of Anesthesia Anticipated: to be determined    Assessment/Plan:     ASSESSMENT and PLAN:  1. Preop general physical exam/cataracts  Hue is medically stable for anesthesia for unilateral cataract surgery.  She currently is free of symptoms of an infectious disease and cardiovascular nature.  She has no untoward reaction to local or general anesthetic.  She is able to achieve 4 METS of physical activity.  Of note, there is a remote history of DVT associated with birth control pills when she was a young woman.    Her medications are reviewed.  She is going to hold her ACE inhibitor-lisinopril on the morning of surgery.  She will take this upon completion of her cataract procedure.  She will hold her aspirin on the a.m. of surgery.    Aftercare will be provided by her family.    2. Essential hypertension  Controlled on current medications.    3. Coronary atherosclerosis due to lipid rich plaque  She is status post inferior wall MI in 2005.  There was coronary intervention with a stent.  She has been stable since.  No angina.  She is on preventative therapy.    4. Right-sided carotid artery disease, unspecified type (H)  Followed with [facility] annually-this has been stable    5. Acquired hypothyroidism  She is due for an update on TSH with recent med changes  - TSH with free T4 reflex; Future    6. Hypercholesterolemia    - rosuvastatin  (CRESTOR) 40 MG tablet; Take 1 tablet (40 mg) by mouth daily  Dispense: 90 tablet; Refill: 3              Subjective     HPI related to upcoming procedure: Hue is a delightful 71-year-old female who is here today for preoperative evaluation prior to having cataract surgery.  Currently, she is doing well.  She has no chief complaints.  She is looking forward to improvement of her vision.    She has no current symptoms of chest pain, shortness of breath, cough, fever or chills.  She has had no recent exposures to COVID-19.    Pertinent anesthesia history is reviewed: She has no history of untoward reaction to local or general anesthetic.  Of note, her history is significant for remote myocardial [id number] wall MI.  She has occasional PVCs.  She has a remote history of a DVT but has been stable for several years.  She does not have chronic lung or kidney disease.  She is able to exercise at greater than 4 METS of activity.  She is a non-smoker.    Preop Questions 6/14/2022   1. Have you ever had a heart attack or stroke? YES -inferior wall MI-2005   2. Have you ever had surgery on your heart or blood vessels, such as a stent placement, a coronary artery bypass, or surgery on an artery in your head, neck, heart, or legs? YES -stent placement-2005   3. Do you have chest pain with activity? No   4. Do you have a history of  heart failure? No   5. Do you currently have a cold, bronchitis or symptoms of other infection? No   6. Do you have a cough, shortness of breath, or wheezing? No   7. Do you or anyone in your family have previous history of blood clots? YES -personal history of DVT remotely- when on birth control pills   8. Do you or does anyone in your family have a serious bleeding problem such as prolonged bleeding following surgeries or cuts? No   9. Have you ever had problems with anemia or been told to take iron pills?   No   10. Have you had any abnormal blood loss such as black, tarry or bloody  stools, or abnormal vaginal bleeding?   No history of blood loss   11. Have you ever had a blood transfusion? No   12. Are you willing to have a blood transfusion if it is medically needed before, during, or after your surgery? Yes   13. Have you or any of your relatives ever had problems with anesthesia? No   14. Do you have sleep apnea, excessive snoring or daytime drowsiness? No   15. Do you have any artifical heart valves or other implanted medical devices like a pacemaker, defibrillator, or continuous glucose monitor? No   16. Do you have artificial joints? No   17. Are you allergic to latex? No         Further interval history and review of symptoms:          Preoperative Review of :   reviewed - no record of controlled substances prescribed.    Patient Active Problem List    Diagnosis Date Noted     Pelvic floor dysfunction 03/17/2022     Priority: Medium     Mixed stress and urge urinary incontinence 03/17/2022     Priority: Medium     Essential hypertension      Priority: Medium     Created by Conversion  Replacement Utility updated for latest IMO load         Hypercholesterolemia      Priority: Medium     Created by Conversion         Carotid artery disease (H)      Priority: Medium     Created by Conversion         Palpitations 10/05/2017     Priority: Medium     Greater trochanteric bursitis of both hips 01/30/2017     Priority: Medium     Chondrocalcinosis 10/31/2016     Priority: Medium     Diffuse Joint Pains (Arthralgias)      Priority: Medium     Created by Conversion  Replacement Utility updated for latest IMO load         Cholelithiasis      Priority: Medium     Created by Conversion  Replacement Utility updated for latest IMO load         Hypothyroidism      Priority: Medium     Created by Conversion  Replacement Utility updated for latest IMO load         Vitamin D Deficiency      Priority: Medium     Created by Conversion  Replacement Utility updated for latest IMO load         Coronary  Artery Disease      Priority: Medium     Created by Conversion  Replacement Utility updated for latest IMO load         Osteopenia      Priority: Medium     Created by Conversion  Replacement Utility updated for latest IMO load         Osteoarthrosis Of Multiple Sites      Priority: Medium     Created by Conversion  Replacement Utility updated for latest IMO load         Ganglion cyst of flexor tendon sheath of finger, right 2015     Priority: Medium      Past Medical History:   Diagnosis Date     Arthralgia      Breast cyst y-10     Carotid artery stenosis      Cholelithiasis      Chondrocalcinosis      Coronary artery disease      Hyperlipidemia      Hypertension      Hypothyroidism      Myocardial infarction (H)      Nocturia      Osteoarthrosis      Osteopenia      Palpitations 10/5/2017     Vitamin D deficiency      Past Surgical History:   Procedure Laterality Date     BREAST CYST ASPIRATION Left     long time ago     HC KNEE SCOPE, DIAGNOSTIC      Description: Arthroscopy Knee Right;  Recorded: 2009;     LAPAROSCOPIC CHOLECYSTECTOMY N/A 2015    Procedure: LAPAROSCOPIC CHOLECYSTECTOMY ;  Surgeon: Bao Hanley MD;  Location: [facility];  Service:      TUBAL LIGATION       [facility]  DELIVERY ONLY      Description:  Section;  Recorded: 2012;  Comments: three  Annotations: Had 3 C-sections     Current Outpatient Medications   Medication Sig Dispense Refill     acetaminophen (TYLENOL) 500 MG tablet Take 1,000 mg by mouth 3 times daily        ASPIRIN (ASPIR-81 ORAL) [ASPIRIN (ASPIR-81 ORAL)] Take 1 tablet by mouth daily.       calcium citrate-vitamin D (CITRACAL) 315-200 MG-UNIT TABS per tablet Take 1 tablet by mouth daily       estradiol (ESTRACE) 0.1 MG/GM vaginal cream Place 1 g vaginally twice a week 42.5 g 3     famotidine (PEPCID) 20 MG tablet [FAMOTIDINE (PEPCID) 20 MG TABLET] Take 20 mg by mouth 2 (two) times a day as needed for heartburn.       gabapentin  "(NEURONTIN) 300 MG capsule Take 1 capsule (300 mg) by mouth At Bedtime 90 capsule 3     levothyroxine (SYNTHROID/LEVOTHROID) 88 MCG tablet Take 1 tablet (88 mcg) by mouth daily 90 tablet 3     lisinopril (ZESTRIL) 10 MG tablet Take 1 tablet (10 mg) by mouth daily 90 tablet 2     metoprolol succinate ER (TOPROL-XL) 50 MG 24 hr tablet Take 1 tablet (50 mg) by mouth daily 90 tablet 2     multivitamin, therapeutic (THERA-VIT) TABS tablet Take 1 tablet by mouth daily       rosuvastatin (CRESTOR) 40 MG tablet Take 1 tablet (40 mg) by mouth daily 90 tablet 1     traMADol (ULTRAM) 50 MG tablet TAKE 1 TABLET BY MOUTH EVERY 6 HOURS AS NEEDED FOR PAIN 20 tablet 0     Vitamin D, Cholecalciferol, 25 MCG (1000 UT) TABS Take 1 tablet by mouth daily         No Known Allergies     Social History     Tobacco Use     Smoking status: Former Smoker     Packs/day: 0.50     Years: 20.00     Pack years: 10.00     Quit date: 1985     Years since quittin.4     Smokeless tobacco: Never Used   Substance Use Topics     Alcohol use: Yes     Alcohol/week: 2.0 - 3.0 standard drinks     History   Drug Use No         Objective     /72 (BP Location: Left arm, Patient Position: Sitting, Cuff Size: Adult Regular)   Pulse 66   Ht 1.537 m (5' 0.5\")   Wt 71.2 kg (156 lb 14.4 oz)   SpO2 97%   BMI 30.14 kg/m        Physical Exam  EYES: Eyelids, conjunctiva, and sclera were normal. Pupils were normal. Cornea, iris, and lens were normal bilaterally.  HEAD, EARS, NOSE, MOUTH, AND THROAT: Head and face were normal. Hearing was normal to voice and the ears were normal to external exam. Nose appearance was normal and there was no discharge. Oropharynx was normal.  TMs were normal.  NECK: Neck appearance was normal. There were no neck masses and the thyroid was not enlarged.  RESPIRATORY: Breathing pattern was normal and the chest moved symmetrically.   Lung sounds were equal bilaterally.  CARDIOVASCULAR: Heart rate and rhythm were normal.  S1 " and S2 were normal and there were no extra sounds or murmurs. Peripheral pulses in arms and legs were normal.  Jugular venous pressure was normal.  There was no peripheral edema.  GASTROINTESTINAL: The abdomen was normal in contour.  Bowel sounds were present.   Palpation detected no tenderness, mass, or enlarged organs.   MUSCULOSKELETAL: Skeletal configuration was normal and muscle mass was normal for age. Joint appearance was overall normal.  LYMPHATIC: There were no enlarged nodes.  SKIN/HAIR/NAILS: Skin color was normal.  There were no abnormal skin lesions.  Hair and nails were normal.  NEUROLOGIC: The patient was alert and oriented to person, place, time, and circumstance. Speech was normal. Cranial nerves were normal. Motor strength was normal for age. The patient was normally coordinated.  PSYCHIATRIC:  Mood and affect were normal and the patient had normal recent and remote memory. The patient's judgment and insight were normal.          Recent Labs   Lab Test 04/18/22  0737 02/07/22  0734 02/22/21  0727   HGB  --  13.4 13.8    139 139   POTASSIUM 4.2 4.5 4.6   CR 0.76 0.67 0.69   A1C 5.6 5.7*  --    TSH 1.33 0.22* 0.41   VITDT  --  61  --        Lab Results   Component Value Date    CHOL 160 02/07/2022        Diagnostics:  No diagnostics needed for cataract surgery.  Recent labs in April were normal.           Signed Electronically by: Kayley Johnson MD

## 2022-08-03 DIAGNOSIS — M15.0 PRIMARY OSTEOARTHRITIS INVOLVING MULTIPLE JOINTS: ICD-10-CM

## 2022-08-04 RX ORDER — TRAMADOL HYDROCHLORIDE 50 MG/1
TABLET ORAL
Qty: 20 TABLET | Refills: 0 | Status: SHIPPED | OUTPATIENT
Start: 2022-08-04 | End: 2022-10-03

## 2022-08-12 ENCOUNTER — ANCILLARY PROCEDURE (OUTPATIENT)
Dept: MAMMOGRAPHY | Facility: CLINIC | Age: 72
End: 2022-08-12
Attending: INTERNAL MEDICINE
Payer: COMMERCIAL

## 2022-08-12 DIAGNOSIS — Z12.31 VISIT FOR SCREENING MAMMOGRAM: ICD-10-CM

## 2022-08-12 PROCEDURE — 77067 SCR MAMMO BI INCL CAD: CPT

## 2022-10-01 ENCOUNTER — HEALTH MAINTENANCE LETTER (OUTPATIENT)
Age: 72
End: 2022-10-01

## 2022-10-02 DIAGNOSIS — M15.0 PRIMARY OSTEOARTHRITIS INVOLVING MULTIPLE JOINTS: ICD-10-CM

## 2022-10-03 RX ORDER — TRAMADOL HYDROCHLORIDE 50 MG/1
TABLET ORAL
Qty: 20 TABLET | Refills: 0 | Status: SHIPPED | OUTPATIENT
Start: 2022-10-03 | End: 2022-11-28

## 2022-11-09 ENCOUNTER — OFFICE VISIT (OUTPATIENT)
Dept: INTERNAL MEDICINE | Facility: CLINIC | Age: 72
End: 2022-11-09
Payer: COMMERCIAL

## 2022-11-09 VITALS
OXYGEN SATURATION: 97 % | HEIGHT: 60 IN | WEIGHT: 158.6 LBS | TEMPERATURE: 97 F | BODY MASS INDEX: 31.14 KG/M2 | RESPIRATION RATE: 14 BRPM | SYSTOLIC BLOOD PRESSURE: 112 MMHG | HEART RATE: 78 BPM | DIASTOLIC BLOOD PRESSURE: 66 MMHG

## 2022-11-09 DIAGNOSIS — R53.83 OTHER FATIGUE: ICD-10-CM

## 2022-11-09 DIAGNOSIS — E55.9 VITAMIN D DEFICIENCY, UNSPECIFIED: ICD-10-CM

## 2022-11-09 DIAGNOSIS — I10 ESSENTIAL HYPERTENSION: ICD-10-CM

## 2022-11-09 DIAGNOSIS — M15.8 OTHER OSTEOARTHRITIS INVOLVING MULTIPLE JOINTS: Primary | ICD-10-CM

## 2022-11-09 DIAGNOSIS — I77.9 RIGHT-SIDED CAROTID ARTERY DISEASE, UNSPECIFIED TYPE (H): ICD-10-CM

## 2022-11-09 DIAGNOSIS — E03.9 ACQUIRED HYPOTHYROIDISM: ICD-10-CM

## 2022-11-09 PROBLEM — K80.20 CHOLELITHIASIS: Status: ACTIVE | Noted: 2021-01-06

## 2022-11-09 LAB
ALBUMIN SERPL BCG-MCNC: 4.6 G/DL (ref 3.5–5.2)
ALP SERPL-CCNC: 53 U/L (ref 35–104)
ALT SERPL W P-5'-P-CCNC: 25 U/L (ref 10–35)
ANION GAP SERPL CALCULATED.3IONS-SCNC: 12 MMOL/L (ref 7–15)
AST SERPL W P-5'-P-CCNC: 28 U/L (ref 10–35)
BILIRUB SERPL-MCNC: 0.4 MG/DL
BUN SERPL-MCNC: 12 MG/DL (ref 8–23)
CALCIUM SERPL-MCNC: 9.2 MG/DL (ref 8.8–10.2)
CHLORIDE SERPL-SCNC: 102 MMOL/L (ref 98–107)
CREAT SERPL-MCNC: 0.65 MG/DL (ref 0.51–0.95)
DEPRECATED CALCIDIOL+CALCIFEROL SERPL-MC: 65 UG/L (ref 20–75)
DEPRECATED HCO3 PLAS-SCNC: 24 MMOL/L (ref 22–29)
ERYTHROCYTE [DISTWIDTH] IN BLOOD BY AUTOMATED COUNT: 12 % (ref 10–15)
ERYTHROCYTE [SEDIMENTATION RATE] IN BLOOD BY WESTERGREN METHOD: 8 MM/HR (ref 0–20)
GFR SERPL CREATININE-BSD FRML MDRD: >90 ML/MIN/1.73M2
GLUCOSE SERPL-MCNC: 99 MG/DL (ref 70–99)
HCT VFR BLD AUTO: 42 % (ref 35–47)
HGB BLD-MCNC: 13.7 G/DL (ref 11.7–15.7)
MCH RBC QN AUTO: 31.9 PG (ref 26.5–33)
MCHC RBC AUTO-ENTMCNC: 32.6 G/DL (ref 31.5–36.5)
MCV RBC AUTO: 98 FL (ref 78–100)
PLATELET # BLD AUTO: 184 10E3/UL (ref 150–450)
POTASSIUM SERPL-SCNC: 4.6 MMOL/L (ref 3.4–5.3)
PROT SERPL-MCNC: 7.1 G/DL (ref 6.4–8.3)
RBC # BLD AUTO: 4.3 10E6/UL (ref 3.8–5.2)
SODIUM SERPL-SCNC: 138 MMOL/L (ref 136–145)
TSH SERPL DL<=0.005 MIU/L-ACNC: 1.32 UIU/ML (ref 0.3–4.2)
WBC # BLD AUTO: 5.6 10E3/UL (ref 4–11)

## 2022-11-09 PROCEDURE — 85652 RBC SED RATE AUTOMATED: CPT | Performed by: INTERNAL MEDICINE

## 2022-11-09 PROCEDURE — 85027 COMPLETE CBC AUTOMATED: CPT | Performed by: INTERNAL MEDICINE

## 2022-11-09 PROCEDURE — 36415 COLL VENOUS BLD VENIPUNCTURE: CPT | Performed by: INTERNAL MEDICINE

## 2022-11-09 PROCEDURE — 82306 VITAMIN D 25 HYDROXY: CPT | Performed by: INTERNAL MEDICINE

## 2022-11-09 PROCEDURE — 80053 COMPREHEN METABOLIC PANEL: CPT | Performed by: INTERNAL MEDICINE

## 2022-11-09 PROCEDURE — 99214 OFFICE O/P EST MOD 30 MIN: CPT | Performed by: INTERNAL MEDICINE

## 2022-11-09 PROCEDURE — 84443 ASSAY THYROID STIM HORMONE: CPT | Performed by: INTERNAL MEDICINE

## 2022-11-09 ASSESSMENT — PAIN SCALES - GENERAL: PAINLEVEL: NO PAIN (0)

## 2022-11-19 DIAGNOSIS — E03.9 ACQUIRED HYPOTHYROIDISM: ICD-10-CM

## 2022-11-20 RX ORDER — LISINOPRIL 10 MG/1
10 TABLET ORAL DAILY
Qty: 90 TABLET | Refills: 3 | Status: SHIPPED | OUTPATIENT
Start: 2022-11-20 | End: 2023-11-16

## 2022-11-20 RX ORDER — METOPROLOL SUCCINATE 50 MG/1
TABLET, EXTENDED RELEASE ORAL
Qty: 90 TABLET | Refills: 3 | Status: SHIPPED | OUTPATIENT
Start: 2022-11-20 | End: 2023-11-06

## 2022-11-21 NOTE — TELEPHONE ENCOUNTER
"Last Written Prescription Date:  2/11/22  Last Fill Quantity: 90,  # refills: 2   Last office visit provider:  11/9/22     Requested Prescriptions   Pending Prescriptions Disp Refills     metoprolol succinate ER (TOPROL XL) 50 MG 24 hr tablet [Pharmacy Med Name: METOPROLOL SUCC ER 50 MG TAB] 90 tablet 2     Sig: TAKE 1 TABLET BY MOUTH EVERY DAY       Beta-Blockers Protocol Passed - 11/19/2022  9:26 AM        Passed - Blood pressure under 140/90 in past 12 months     BP Readings from Last 3 Encounters:   11/09/22 112/66   06/17/22 130/72   02/11/22 130/70                 Passed - Patient is age 6 or older        Passed - Recent (12 mo) or future (30 days) visit within the authorizing provider's specialty     Patient has had an office visit with the authorizing provider or a provider within the authorizing providers department within the previous 12 mos or has a future within next 30 days. See \"Patient Info\" tab in inbasket, or \"Choose Columns\" in Meds & Orders section of the refill encounter.              Passed - Medication is active on med list           lisinopril (ZESTRIL) 10 MG tablet [Pharmacy Med Name: LISINOPRIL 10 MG TABLET] 90 tablet 2     Sig: TAKE 1 TABLET (10 MG) BY MOUTH DAILY.       ACE Inhibitors (Including Combos) Protocol Passed - 11/19/2022  9:26 AM        Passed - Blood pressure under 140/90 in past 12 months     BP Readings from Last 3 Encounters:   11/09/22 112/66   06/17/22 130/72   02/11/22 130/70                 Passed - Recent (12 mo) or future (30 days) visit within the authorizing provider's specialty     Patient has had an office visit with the authorizing provider or a provider within the authorizing providers department within the previous 12 mos or has a future within next 30 days. See \"Patient Info\" tab in inbasket, or \"Choose Columns\" in Meds & Orders section of the refill encounter.              Passed - Medication is active on med list        Passed - Patient is age 18 or older    "     Passed - No active pregnancy on record        Passed - Normal serum creatinine on file in past 12 months     Recent Labs   Lab Test 11/09/22  1147   CR 0.65       Ok to refill medication if creatinine is low          Passed - Normal serum potassium on file in past 12 months     Recent Labs   Lab Test 11/09/22  1147   POTASSIUM 4.6             Passed - No positive pregnancy test within past 12 months             Ginna Adams, RN 11/20/22 6:15 PM

## 2022-11-22 DIAGNOSIS — N95.2 ATROPHIC VAGINITIS: ICD-10-CM

## 2022-11-23 RX ORDER — ESTRADIOL 0.1 MG/G
CREAM VAGINAL
Qty: 42.5 G | Refills: 3 | Status: SHIPPED | OUTPATIENT
Start: 2022-11-23 | End: 2023-09-27

## 2022-11-24 NOTE — TELEPHONE ENCOUNTER
"Last Written Prescription Date:  2/11/22  Last Fill Quantity: 42.5,  # refills: 3   Last office visit provider:  11/9/22     Requested Prescriptions   Pending Prescriptions Disp Refills     estradiol (ESTRACE) 0.1 MG/GM vaginal cream [Pharmacy Med Name: ESTRADIOL 0.01% CREAM] 42.5 g 1     Sig: INSERT 1 GRAM INTO THE VAGINA DAILY. ONE GRAM EVERY MON, WED, FRIDAY       Hormone Replacement Therapy Passed - 11/22/2022  4:27 PM        Passed - Blood pressure under 140/90 in past 12 months     BP Readings from Last 3 Encounters:   11/09/22 112/66   06/17/22 130/72   02/11/22 130/70                 Passed - Recent (12 mo) or future (30 days) visit within the authorizing provider's specialty     Patient has had an office visit with the authorizing provider or a provider within the authorizing providers department within the previous 12 mos or has a future within next 30 days. See \"Patient Info\" tab in inbasket, or \"Choose Columns\" in Meds & Orders section of the refill encounter.              Passed - Patient has mammogram in past 2 years on file if age 50-75        Passed - Medication is active on med list        Passed - Patient is 18 years of age or older        Passed - No active pregnancy on record        Passed - No positive pregnancy test on record in past 12 months             Ginna Adams RN 11/23/22 7:29 PM  "

## 2022-12-23 ENCOUNTER — MYC MEDICAL ADVICE (OUTPATIENT)
Dept: INTERNAL MEDICINE | Facility: CLINIC | Age: 72
End: 2022-12-23

## 2023-01-19 NOTE — TELEPHONE ENCOUNTER
Refill Approved    Rx renewed per Medication Renewal Policy. Medication was last renewed on 1/27/2020.    Maryjo Ruth, [facility] Triage/Med Refill 8/11/2020     Requested Prescriptions   Pending Prescriptions Disp Refills     gabapentin (NEURONTIN) 300 MG capsule [Pharmacy Med Name: GABAPENTIN 300 MG CAPSULE] 270 capsule 1     Sig: TAKE 1 CAPSULE BY MOUTH THREE TIMES A DAY       Gabapentin/Levetiracetam/Tiagabine Refill Protocol  Passed - 8/11/2020 12:09 AM        Passed - PCP or prescribing provider visit in past 12 months or next 3 months     Last office visit with prescriber/PCP: 7/19/2019 Kayley Johnson MD OR same dept: Visit date not found OR same specialty: 7/19/2019 Kayley Johnson MD  Last physical: 2/5/2020 Last MTM visit: Visit date not found   Next visit within 3 mo: Visit date not found  Next physical within 3 mo: Visit date not found  Prescriber OR PCP: Kayley Johnson MD  Last diagnosis associated with med order: 1. Arthralgia, unspecified joint  - gabapentin (NEURONTIN) 300 MG capsule [Pharmacy Med Name: GABAPENTIN 300 MG CAPSULE]; TAKE 1 CAPSULE BY MOUTH THREE TIMES A DAY  Dispense: 270 capsule; Refill: 1    If protocol passes may refill for 12 months if within 3 months of last provider visit (or a total of 15 months).                            
no

## 2023-02-15 ENCOUNTER — TRANSFERRED RECORDS (OUTPATIENT)
Dept: HEALTH INFORMATION MANAGEMENT | Facility: CLINIC | Age: 73
End: 2023-02-15

## 2023-03-21 ENCOUNTER — DOCUMENTATION ONLY (OUTPATIENT)
Dept: INTERNAL MEDICINE | Facility: CLINIC | Age: 73
End: 2023-03-21
Payer: COMMERCIAL

## 2023-03-21 NOTE — PROGRESS NOTES
Patient is requesting pre physical labs from Dr. Méndez.  Visit is scheduled for March 30.  No orders in the chart.  Please advise/enter orders.  Requesting thyroid, cholesterol and electrolytes.

## 2023-03-22 ENCOUNTER — NURSE TRIAGE (OUTPATIENT)
Dept: NURSING | Facility: CLINIC | Age: 73
End: 2023-03-22

## 2023-03-22 ENCOUNTER — VIRTUAL VISIT (OUTPATIENT)
Dept: INTERNAL MEDICINE | Facility: CLINIC | Age: 73
End: 2023-03-22
Payer: COMMERCIAL

## 2023-03-22 DIAGNOSIS — U07.1 COVID-19 VIRUS INFECTION: Primary | ICD-10-CM

## 2023-03-22 DIAGNOSIS — I10 ESSENTIAL HYPERTENSION: ICD-10-CM

## 2023-03-22 PROBLEM — K80.20 CHOLELITHIASIS: Status: RESOLVED | Noted: 2021-01-06 | Resolved: 2023-03-22

## 2023-03-22 PROCEDURE — 99443 PR PHYSICIAN TELEPHONE EVALUATION 21-30 MIN: CPT | Mod: CS | Performed by: INTERNAL MEDICINE

## 2023-03-22 RX ORDER — FLUTICASONE PROPIONATE 50 MCG
1 SPRAY, SUSPENSION (ML) NASAL DAILY
Qty: 16 G | Refills: 11 | Status: SHIPPED | OUTPATIENT
Start: 2023-03-22 | End: 2023-05-10

## 2023-03-22 RX ORDER — CODEINE PHOSPHATE AND GUAIFENESIN 10; 100 MG/5ML; MG/5ML
1-2 SOLUTION ORAL EVERY 6 HOURS PRN
Qty: 240 ML | Refills: 0 | Status: SHIPPED | OUTPATIENT
Start: 2023-03-22 | End: 2023-05-10

## 2023-03-22 RX ORDER — PREDNISONE 20 MG/1
20 TABLET ORAL EVERY MORNING
Qty: 4 TABLET | Refills: 0 | Status: SHIPPED | OUTPATIENT
Start: 2023-03-22 | End: 2023-03-26

## 2023-03-22 RX ORDER — BENZONATATE 200 MG/1
200 CAPSULE ORAL 3 TIMES DAILY PRN
Qty: 20 CAPSULE | Refills: 1 | Status: SHIPPED | OUTPATIENT
Start: 2023-03-22 | End: 2023-05-10

## 2023-03-22 NOTE — PROGRESS NOTES
Xiomy is a 72 year old female being evaluated via a billable phone visit, and would like to be contacted via the following  Cell phone/Mobile:   Telephone Information:   Mobile [phone]       ASSESSMENT and PLAN:  1. COVID-19 virus infection  Advise against Paxlovid.  Treat symptoms aggressively  - fluticasone (FLONASE) 50 MCG/ACT nasal spray; Spray 1 spray into both nostrils daily  Dispense: 16 g; Refill: 11  - predniSONE (DELTASONE) 20 MG tablet; Take 1 tablet (20 mg) by mouth every morning for 4 days  Dispense: 4 tablet; Refill: 0  - benzonatate (TESSALON) 200 MG capsule; Take 1 capsule (200 mg) by mouth 3 times daily as needed for cough  Dispense: 20 capsule; Refill: 1  - guaiFENesin-codeine (ROBITUSSIN AC) 100-10 MG/5ML solution; Take 5-10 mLs by mouth every 6 hours as needed for cough  Dispense: 240 mL; Refill: 0    2. Essential hypertension  Discussed possible role of lisinopril worsening symptoms and recommend holding for a few days       Patient Instructions   Advise against Paxlovid    Prednisone 20 mg daily for 4 days    Robitussin with codeine as needed    Benzonatate as needed    Continue home guaifenesin    Hold lisinopril for 5 days    Flonase nasal spray    Afrin as needed    MyChart communication initiated            Return in about 4 months (around 7/22/2023) for using a video visit.       CHIEF COMPLAINT:  Chief Complaint   Patient presents with     Recheck Medication     COVID TREATMENT     PT REPORTS POSITIVE AT HOME COVID TEST SEEKS TREATMENT       HISTORY OF PRESENT ILLNESS:  Xiomy is a 72 year old female contacting the clinic today via phone for Covid    Became symptomatic with viral symptoms on Sat, March 18.  Tested positive for COVID today.  Primary symptomatic complaints include cough, congestion, rhinorrhea,     Vaccinated against Covid x 5     Discussed that omicron is much more contagious and much less virulent than previous versions of COVID and that we are seeing very few  cases go to the hospital, or end up in the ICU or on ventilators.  The listed MASSBP score is 5 and renal function is normal as below:  Lab Results   Component Value Date    CR 0.65 11/09/2022    and   Lab Results   Component Value Date    GFRESTIMATED >90 11/09/2022    GFRESTIMATED >60 02/22/2021       {The MASSBP is calculated on a scale of 0-25: age 65 years and older (2 points), BMI 35 kg/m2 and higher (2), diabetes mellitus (2), chronic kidney disease (3), cardiovascular disease in a patient 55 years and older (2), chronic respiratory disease in a patient 55 years and older (3), hypertension in a patient 55 years and older (1), and immunocompromised status (4), pregnancy (4), or BIPOC status (2).    {Reviewed medications, and discussed that medicines like Lisinopril and Losartan can contribute to cough and inflammation when combined with a viral infection due to inflammatory nature of these meds.    --Discussed that infection with omicron is the equivalent of a vaccination to omicron, and that this infection/vaccination will supplement the original COVID vaccines.  Discussed that natural immunity to natural infection has always been superior to artificial immunity from vaccination throughout history and that this illness and subsequent antibody levels when taken in conjunction with previous vaccination to original COVID should result in superior immunity to that provided by ongoing COVID vaccine    --Discussed that Paxlovid was studied and brought to market on patients who were infected with delta COVID, at high risk of COVID decline, and were not vaccinated, thus the benefit of Paxlovid in vaccinated people with omicron is less clear.    --In addition Paxlovid interacts with many medications including cholesterol medicine, blood pressure medicine, and blood thinners.    History of Present Illness       Reason for visit:  Covid positive  Symptom onset:  3-7 days ago  Symptoms include:  Cough upper airway  congestion phlegm  Symptom intensity:  Moderate  Symptom progression:  Staying the same  Had these symptoms before:  Yes  Has tried/received treatment for these symptoms:  Yes  Previous treatment was successful:  Yes  Prior treatment description:  Short course of prednisone  What makes it worse:  Not really  What makes it better:  Hot coffee    She eats 2-3 servings of fruits and vegetables daily.She consumes 0 sweetened beverage(s) daily.She exercises with enough effort to increase her heart rate 30 to 60 minutes per day.  She exercises with enough effort to increase her heart rate 5 days per week.   She is taking medications regularly.      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  She is .       TOBACCO USE:  History   Smoking Status     Former     Packs/day: 0.50     Years: 20.00     Types: Cigarettes     Quit date: 1/1/1985   Smokeless Tobacco     Never       VITALS:  There were no vitals filed for this visit.  There were no vitals taken for this visit. Estimated body mass index is 30.97 kg/m  as calculated from the following:    Height as of 11/9/22: 1.524 m (5').    Weight as of 11/9/22: 71.9 kg (158 lb 9.6 oz).    PHYSICAL EXAM:  (observations via Phone)  Nasal congestion.  Occasional cough.  No shortness of breath    MEDICATIONS  Current Outpatient Medications   Medication Sig Dispense Refill     acetaminophen (TYLENOL) 500 MG tablet Take 1,000 mg by mouth 3 times daily        ASPIRIN (ASPIR-81 ORAL) [ASPIRIN (ASPIR-81 ORAL)] Take 1 tablet by mouth daily.       benzonatate (TESSALON) 200 MG capsule Take 1 capsule (200 mg) by mouth 3 times daily as needed for cough 20 capsule 1     calcium citrate-vitamin D (CITRACAL) 315-200 MG-UNIT TABS per tablet Take 1 tablet by mouth daily       estradiol (ESTRACE) 0.1 MG/GM vaginal cream INSERT 1 GRAM INTO THE VAGINA DAILY. ONE GRAM EVERY MON, WED,  FRIDAY 42.5 g 3     famotidine (PEPCID) 20 MG tablet [FAMOTIDINE (PEPCID) 20 MG TABLET] Take 20 mg by mouth 2 (two) times a day as needed for heartburn.       fluticasone (FLONASE) 50 MCG/ACT nasal spray Spray 1 spray into both nostrils daily 16 g 11     gabapentin (NEURONTIN) 300 MG capsule Take 1 capsule (300 mg) by mouth At Bedtime 90 capsule 3     guaiFENesin-codeine (ROBITUSSIN AC) 100-10 MG/5ML solution Take 5-10 mLs by mouth every 6 hours as needed for cough 240 mL 0     levothyroxine (SYNTHROID/LEVOTHROID) 88 MCG tablet Take 1 tablet (88 mcg) by mouth daily 90 tablet 3     lisinopril (ZESTRIL) 10 MG tablet TAKE 1 TABLET (10 MG) BY MOUTH DAILY. 90 tablet 3     metoprolol succinate ER (TOPROL XL) 50 MG 24 hr tablet TAKE 1 TABLET BY MOUTH EVERY DAY 90 tablet 3     multivitamin, therapeutic (THERA-VIT) TABS tablet Take 1 tablet by mouth daily       predniSONE (DELTASONE) 20 MG tablet Take 1 tablet (20 mg) by mouth every morning for 4 days 4 tablet 0     rosuvastatin (CRESTOR) 40 MG tablet Take 1 tablet (40 mg) by mouth daily 90 tablet 3     traMADol (ULTRAM) 50 MG tablet TAKE 1 TABLET BY MOUTH EVERY 6 HOURS AS NEEDED FOR PAIN 20 tablet 3     Vitamin D, Cholecalciferol, 25 MCG (1000 UT) TABS Take 1 tablet by mouth daily         Notes summarized:   Labs, x-rays, cardiology, GI tests reviewed: Normal renal function  Recent Labs   Lab Test 11/09/22  1147 06/17/22  0720 04/18/22  0737 02/07/22  0734   HGB 13.7  --   --  13.4   WBC 5.6  --   --  4.9     --  141 139   POTASSIUM 4.6  --  4.2 4.5   CR 0.65  --  0.76 0.67   A1C  --   --  5.6 5.7*   TSH 1.32 2.47 1.33 0.22*   VITDT 65  --   --  61   SED 8  --   --   --      No results found for: [id number]  Lab Results   Component Value Date    CHOL 160 02/07/2022     New orders: No orders of the defined types were placed in this encounter.      Independent review of:    Patient would like to receive their AVS by Amrik Chew MD  [facility]  [facility]    Phone-Visit Details  Type of service:  Phone Visit  Patient has given verbal consent to a Phone visit?  Yes  How would you like to obtain your AVS?  MyChart  Will anyone else be joining your phone visit, giving supplemental history? No    Originating location (pt location): Home    Distant Location (provider location):  Off-site    Phone Start Time: 2:40 PM  Phone End time:  3:05 PM  Conversation plus orders: 25 minutes  Dictation time:  3 minutes    The visit lasted a total of 26 minutes

## 2023-03-22 NOTE — TELEPHONE ENCOUNTER
COVID Positive/Requesting COVID treatment    Patient is positive for COVID and requesting treatment options.    Date of positive COVID test (PCR or at home)? 3/22/2023  Current COVID symptoms: fever or chills, cough and congestion or runny nose  Date COVID symptoms began: 3/18/2023    Message should be routed to clinic RN pool. Best phone number to use for call back: [phone]    KHADRA TRAN RN        Reason for Disposition    [1] HIGH RISK for severe COVID complications (e.g., weak immune system, age > 64 years, obesity with BMI of 30 or higher, pregnant, chronic lung disease or other chronic medical condition) AND [2] COVID symptoms (e.g., cough, fever)  (Exceptions: Already seen by PCP and no new or worsening symptoms.)    Additional Information    Negative: SEVERE difficulty breathing (e.g., struggling for each breath, speaks in single words)    Negative: Difficult to awaken or acting confused (e.g., disoriented, slurred speech)    Negative: Bluish (or gray) lips or face now    Negative: Shock suspected (e.g., cold/pale/clammy skin, too weak to stand, low BP, rapid pulse)    Negative: Sounds like a life-threatening emergency to the triager    Negative: [1] Diagnosed or suspected COVID-19 AND [2] symptoms lasting 3 or more weeks    Negative: [1] COVID-19 exposure AND [2] no symptoms    Negative: COVID-19 vaccine reaction suspected (e.g., fever, headache, muscle aches) occurring 1 to 3 days after getting vaccine    Negative: COVID-19 vaccine, questions about    Negative: [1] Lives with someone known to have influenza (flu test positive) AND [2] flu-like symptoms (e.g., cough, runny nose, sore throat, SOB; with or without fever)    Negative: [1] Adult with possible COVID-19 symptoms AND [2] triager concerned about severity of symptoms or other causes    Negative: COVID-19 and breastfeeding, questions about    Negative: SEVERE or constant chest pain or pressure  (Exception: Mild central chest pain,  present only when coughing.)    Negative: MODERATE difficulty breathing (e.g., speaks in phrases, SOB even at rest, pulse 100-120)    Negative: Headache and stiff neck (can't touch chin to chest)    Negative: Oxygen level (e.g., pulse oximetry) 90 percent or lower    Negative: Chest pain or pressure  (Exception: MILD central chest pain, present only when coughing)    Negative: Patient sounds very sick or weak to the triager    Negative: MILD difficulty breathing (e.g., minimal/no SOB at rest, SOB with walking, pulse <100)    Negative: Fever > 103 F (39.4 C)    Negative: [1] Fever > 101 F (38.3 C) AND [2] over 60 years of age    Negative: [1] Fever > 100.0 F (37.8 C) AND [2] bedridden (e.g., nursing home patient, CVA, chronic illness, recovering from surgery)    Protocols used: CORONAVIRUS (COVID-19) DIAGNOSED OR [id number]

## 2023-03-22 NOTE — PATIENT INSTRUCTIONS
Advise against Paxlovid    Prednisone 20 mg daily for 4 days    Robitussin with codeine as needed    Benzonatate as needed    Continue home guaifenesin    Hold lisinopril for 5 days    Flonase nasal spray    Afrin as needed    MyChart communication initiated

## 2023-03-22 NOTE — PROGRESS NOTES
"Hue is a 72 year old who is being evaluated via a billable telephone visit.      What phone number would you like to be contacted at? [phone]  How would you like to obtain your AVS? MyChart  {PROVIDER LOCATION On-site should be selected for visits conducted from your clinic location or adjoining [facility] hospital, academic office, or other nearby [facility] building. Off-site should be selected for all other provider locations, including home:737093}  Distant Location (provider location):  Off-site    {PROVIDER CHARTING PREFERENCE:809124}    Subjective   Hue is a 72 year old, presenting for the following health issues:  Recheck Medication and COVID TREATMENT (PT REPORTS POSITIVE AT HOME COVID TEST SEEKS TREATMENT)    Additional Questions 3/22/2023   Roomed by MARICEL   Accompanied by ALONE     History of Present Illness       Reason for visit:  Covid positive  Symptom onset:  3-7 days ago  Symptoms include:  Cough upper airway congestion phlegm  Symptom intensity:  Moderate  Symptom progression:  Staying the same  Had these symptoms before:  Yes  Has tried/received treatment for these symptoms:  Yes  Previous treatment was successful:  Yes  Prior treatment description:  Short course of prednisone  What makes it worse:  Not really  What makes it better:  Hot coffee    She eats 2-3 servings of fruits and vegetables daily.She consumes 0 sweetened beverage(s) daily.She exercises with enough effort to increase her heart rate 30 to 60 minutes per day.  She exercises with enough effort to increase her heart rate 5 days per week.   She is taking medications regularly.       {SUPERLIST (Optional):345546}  {additonal problems for provider to add (Optional):070029}      Review of Systems   {ROS COMP (Optional):899598}      Objective           Vitals:  No vitals were obtained today due to virtual visit.    Physical Exam   {GENERAL APPEARANCE:50::\"healthy\",\"alert\",\"no distress\"}  PSYCH: Alert and oriented times 3; coherent " "speech, normal   rate and volume, able to articulate logical thoughts, able   to abstract reason, no tangential thoughts, no hallucinations   or delusions  Her affect is { :6842258::\"normal\"}  RESP: No cough, no audible wheezing, able to talk in full sentences  Remainder of exam unable to be completed due to telephone visits    {Diagnostic Test Results (Optional):761633}    {AMBULATORY ATTESTATION (Optional):514800}        Phone call duration: *** minutes    "

## 2023-03-22 NOTE — TELEPHONE ENCOUNTER
RN COVID TREATMENT VISIT  03/22/23    The patient has been triaged and does not require a higher level of care.    Xiomy Gan  72 year old  Current weight? 158 lbs    Has the patient been seen by a primary care provider at an [facility] or [facility] within the past two years? Yes.   Have you been in close proximity to/do you have a known exposure to a person with a confirmed case of influenza? Yes. Patient informed a virtual visit with a provider will be required for treatment to determine if COVID or influenza medications are best. Patient was scheduled at the end of this call.   Greyson Hernandez RN

## 2023-04-18 ENCOUNTER — TRANSFERRED RECORDS (OUTPATIENT)
Dept: HEALTH INFORMATION MANAGEMENT | Facility: CLINIC | Age: 73
End: 2023-04-18
Payer: COMMERCIAL

## 2023-04-21 ENCOUNTER — TRANSFERRED RECORDS (OUTPATIENT)
Dept: HEALTH INFORMATION MANAGEMENT | Facility: CLINIC | Age: 73
End: 2023-04-21
Payer: COMMERCIAL

## 2023-04-27 ENCOUNTER — MYC MEDICAL ADVICE (OUTPATIENT)
Dept: INTERNAL MEDICINE | Facility: CLINIC | Age: 73
End: 2023-04-27
Payer: COMMERCIAL

## 2023-04-27 DIAGNOSIS — I10 ESSENTIAL HYPERTENSION: ICD-10-CM

## 2023-04-27 RX ORDER — LEVOTHYROXINE SODIUM 88 UG/1
88 TABLET ORAL DAILY
Qty: 90 TABLET | Refills: 2 | Status: SHIPPED | OUTPATIENT
Start: 2023-04-27 | End: 2023-05-10

## 2023-04-27 NOTE — TELEPHONE ENCOUNTER
"Prescription approved per [facility] Refill Protocol.    Last Written Prescription Date:  2/11/2022 - Dr. Johnson   Last Fill Quantity: 90,  # refills: 3   Last office visit provider:  3/22/2023 - Covid treatment visit with Dr. Chew      Requested Prescriptions   Pending Prescriptions Disp Refills     levothyroxine (SYNTHROID/LEVOTHROID) 88 MCG tablet 90 tablet 3     Sig: Take 1 tablet (88 mcg) by mouth daily       Thyroid Protocol Passed - 4/27/2023 11:14 AM        Passed - Patient is 12 years or older        Passed - Recent (12 mo) or future (30 days) visit within the authorizing provider's specialty     Patient has had an office visit with the authorizing provider or a provider within the authorizing providers department within the previous 12 mos or has a future within next 30 days. See \"Patient Info\" tab in inbasket, or \"Choose Columns\" in Meds & Orders section of the refill encounter.              Passed - Medication is active on med list        Passed - Normal TSH on file in past 12 months     Recent Labs   Lab Test 11/09/22  1147   TSH 1.32              Passed - No active pregnancy on record     If patient is pregnant or has had a positive pregnancy test, please check TSH.          Passed - No positive pregnancy test in past 12 months     If patient is pregnant or has had a positive pregnancy test, please check TSH.               Elsa Chacon RN 04/27/23 12:48 PM  "

## 2023-05-03 ASSESSMENT — ENCOUNTER SYMPTOMS
DIARRHEA: 0
PALPITATIONS: 0
CHILLS: 0
HEMATURIA: 0
SHORTNESS OF BREATH: 0
CONSTIPATION: 0
FEVER: 0
EYE PAIN: 0
NERVOUS/ANXIOUS: 0
HEARTBURN: 0
JOINT SWELLING: 0
BREAST MASS: 0
FREQUENCY: 0
HEMATOCHEZIA: 0
HEADACHES: 0
NAUSEA: 0
WEAKNESS: 0
DIZZINESS: 0
ABDOMINAL PAIN: 1
DYSURIA: 0
PARESTHESIAS: 0
COUGH: 0
SORE THROAT: 0
ARTHRALGIAS: 0
MYALGIAS: 0

## 2023-05-03 ASSESSMENT — ACTIVITIES OF DAILY LIVING (ADL): CURRENT_FUNCTION: NO ASSISTANCE NEEDED

## 2023-05-10 ENCOUNTER — OFFICE VISIT (OUTPATIENT)
Dept: INTERNAL MEDICINE | Facility: CLINIC | Age: 73
End: 2023-05-10
Payer: COMMERCIAL

## 2023-05-10 VITALS
OXYGEN SATURATION: 97 % | HEIGHT: 60 IN | DIASTOLIC BLOOD PRESSURE: 60 MMHG | WEIGHT: 159.8 LBS | RESPIRATION RATE: 16 BRPM | TEMPERATURE: 98.5 F | BODY MASS INDEX: 31.37 KG/M2 | SYSTOLIC BLOOD PRESSURE: 118 MMHG | HEART RATE: 84 BPM

## 2023-05-10 DIAGNOSIS — I25.83 CORONARY ATHEROSCLEROSIS DUE TO LIPID RICH PLAQUE: ICD-10-CM

## 2023-05-10 DIAGNOSIS — I10 ESSENTIAL HYPERTENSION: ICD-10-CM

## 2023-05-10 DIAGNOSIS — M15.0 PRIMARY OSTEOARTHRITIS INVOLVING MULTIPLE JOINTS: ICD-10-CM

## 2023-05-10 DIAGNOSIS — F11.20 EPISODIC OPIOID DEPENDENCE (H): ICD-10-CM

## 2023-05-10 DIAGNOSIS — E03.9 ACQUIRED HYPOTHYROIDISM: ICD-10-CM

## 2023-05-10 DIAGNOSIS — E78.00 HYPERCHOLESTEROLEMIA: ICD-10-CM

## 2023-05-10 DIAGNOSIS — Z00.00 ENCOUNTER FOR MEDICARE ANNUAL WELLNESS EXAM: Primary | ICD-10-CM

## 2023-05-10 DIAGNOSIS — I77.9 RIGHT-SIDED CAROTID ARTERY DISEASE, UNSPECIFIED TYPE (H): ICD-10-CM

## 2023-05-10 DIAGNOSIS — E66.811 CLASS 1 OBESITY IN ADULT, UNSPECIFIED BMI, UNSPECIFIED OBESITY TYPE, UNSPECIFIED WHETHER SERIOUS COMORBIDITY PRESENT: ICD-10-CM

## 2023-05-10 LAB
CHOLEST SERPL-MCNC: 154 MG/DL
CREAT UR-MCNC: 47 MG/DL
HDLC SERPL-MCNC: 64 MG/DL
LDLC SERPL CALC-MCNC: 66 MG/DL
NONHDLC SERPL-MCNC: 90 MG/DL
TRIGL SERPL-MCNC: 122 MG/DL

## 2023-05-10 PROCEDURE — 80061 LIPID PANEL: CPT | Performed by: INTERNAL MEDICINE

## 2023-05-10 PROCEDURE — 2894A URINE DRUG CONFIRMATION PANEL: CPT | Performed by: INTERNAL MEDICINE

## 2023-05-10 PROCEDURE — 36415 COLL VENOUS BLD VENIPUNCTURE: CPT | Performed by: INTERNAL MEDICINE

## 2023-05-10 PROCEDURE — G0439 PPPS, SUBSEQ VISIT: HCPCS | Performed by: INTERNAL MEDICINE

## 2023-05-10 PROCEDURE — G0480 DRUG TEST DEF 1-7 CLASSES: HCPCS | Performed by: INTERNAL MEDICINE

## 2023-05-10 PROCEDURE — 99214 OFFICE O/P EST MOD 30 MIN: CPT | Mod: 25 | Performed by: INTERNAL MEDICINE

## 2023-05-10 RX ORDER — LEVOTHYROXINE SODIUM 88 UG/1
88 TABLET ORAL DAILY
Qty: 90 TABLET | Refills: 3 | Status: SHIPPED | OUTPATIENT
Start: 2023-05-10 | End: 2024-04-26

## 2023-05-10 RX ORDER — METHYLPREDNISOLONE 4 MG
TABLET, DOSE PACK ORAL
Qty: 21 TABLET | Refills: 0 | Status: SHIPPED | OUTPATIENT
Start: 2023-05-10 | End: 2023-08-11

## 2023-05-10 ASSESSMENT — ENCOUNTER SYMPTOMS
HEMATOCHEZIA: 0
NAUSEA: 0
COUGH: 0
DYSURIA: 0
FEVER: 0
CHILLS: 0
NERVOUS/ANXIOUS: 0
PALPITATIONS: 0
DIARRHEA: 0
HEARTBURN: 0
EYE PAIN: 0
SORE THROAT: 0
ABDOMINAL PAIN: 1
PARESTHESIAS: 0
HEADACHES: 0
MYALGIAS: 0
JOINT SWELLING: 0
FREQUENCY: 0
SHORTNESS OF BREATH: 0
ARTHRALGIAS: 0
CONSTIPATION: 0
HEMATURIA: 0
BREAST MASS: 0
DIZZINESS: 0
WEAKNESS: 0

## 2023-05-10 ASSESSMENT — ANXIETY QUESTIONNAIRES
6. BECOMING EASILY ANNOYED OR IRRITABLE: NOT AT ALL
7. FEELING AFRAID AS IF SOMETHING AWFUL MIGHT HAPPEN: NOT AT ALL
3. WORRYING TOO MUCH ABOUT DIFFERENT THINGS: NOT AT ALL
GAD7 TOTAL SCORE: 0
IF YOU CHECKED OFF ANY PROBLEMS ON THIS QUESTIONNAIRE, HOW DIFFICULT HAVE THESE PROBLEMS MADE IT FOR YOU TO DO YOUR WORK, TAKE CARE OF THINGS AT HOME, OR GET ALONG WITH OTHER PEOPLE: NOT DIFFICULT AT ALL
5. BEING SO RESTLESS THAT IT IS HARD TO SIT STILL: NOT AT ALL
1. FEELING NERVOUS, ANXIOUS, OR ON EDGE: NOT AT ALL
2. NOT BEING ABLE TO STOP OR CONTROL WORRYING: NOT AT ALL
GAD7 TOTAL SCORE: 0

## 2023-05-10 ASSESSMENT — ACTIVITIES OF DAILY LIVING (ADL): CURRENT_FUNCTION: NO ASSISTANCE NEEDED

## 2023-05-10 ASSESSMENT — PATIENT HEALTH QUESTIONNAIRE - PHQ9
SUM OF ALL RESPONSES TO PHQ QUESTIONS 1-9: 0
5. POOR APPETITE OR OVEREATING: NOT AT ALL

## 2023-05-10 NOTE — LETTER
Opioid / Opioid Plus Controlled Substance Agreement    This is an agreement between you and your provider about the safe and appropriate use of controlled substance/opioids prescribed by your care team. Controlled substances are medicines that can cause physical and mental dependence (abuse).    There are strict laws about having and using these medicines. We here at [facility] are committing to working with you in your efforts to get better. To support you in this work, we ll help you schedule regular office appointments for medicine refills. If we must cancel or change your appointment for any reason, we ll make sure you have enough medicine to last until your next appointment.     As a Provider, I will:  Listen carefully to your concerns and treat you with respect.   Recommend a treatment plan that I believe is in your best interest. This plan may involve therapies other than opioid pain medication.   Talk with you often about the possible benefits, and the risk of harm of any medicine that we prescribe for you.   Provide a plan on how to taper (discontinue or go off) using this medicine if the decision is made to stop its use.    As a Patient, I understand that opioid(s):   Are a controlled substance prescribed by my care team to help me function or work and manage my condition(s).   Are strong medicines and can cause serious side effects such as:  Drowsiness, which can seriously affect my driving ability  A lower breathing rate, enough to cause death  Harm to my thinking ability   Depression   Abuse of and addiction to this medicine  Need to be taken exactly as prescribed. Combining opioids with certain medicines or chemicals (such as illegal drugs, sedatives, sleeping pills, and benzodiazepines) can be dangerous or even fatal. If I stop opioids suddenly, I may have severe withdrawal symptoms.  Do not work for all types of pain nor for all patients. If they re not helpful, I may be asked to stop  them.        The risks, benefits and side effects of these medicine(s) were explained to me. I agree that:  I will take part in other treatments as advised by my care team. This may be psychiatry or counseling, physical therapy, behavioral therapy, group treatment or a referral to a specialist.     I will keep all my appointments. I understand that this is part of the monitoring of opioids. My care team may require an office visit for EVERY opioid/controlled substance refill. If I miss appointments or don t follow instructions, my care team may stop my medicine.    I will take my medicines as prescribed. I will not change the dose or schedule unless my care team tells me to. There will be no refills if I run out early.     I may be asked to come to the clinic and complete a urine drug test or complete a pill count at any time. If I don t give a urine sample or participate in a pill count, the care team may stop my medicine.    I will only receive prescriptions from this clinic for chronic pain. If I am treated by another provider for acute pain issues, I will tell them that I am taking opioid pain medication for chronic pain and that I have a treatment agreement with this provider. I will inform my [facility] care team within one business day if I am given a prescription for any pain medication by another healthcare provider. My [facility] care team can contact other providers and pharmacists about my use of any medicines.    It is up to me to make sure that I don t run out of my medicines on weekends or holidays. If my care team is willing to refill my opioid prescription without a visit, I must request refills only during office hours. Refills may take up to 3 business days to process. I will use one pharmacy to fill all my opioid and other controlled substance prescriptions. I will notify the clinic about any changes to my insurance or medication availability.    I am responsible for my  prescriptions. If the medicine/prescription is lost, stolen or destroyed, it will not be replaced. I also agree not to share controlled substance medicines with anyone.    I am aware I should not use any illegal or recreational drugs. I agree not to drink alcohol unless my care team says I can.       If I enroll in the [place] [facility] program, I will tell my care team prior to my next refill.     I will tell my care team right away if I become pregnant, have a new medical problem treated outside of my regular clinic, or have a change in my medications.    I understand that this medicine can affect my thinking, judgment and reaction time. Alcohol and drugs affect the brain and body, which can affect the safety of my driving. Being under the influence of alcohol or drugs can affect my decision-making, behaviors, personal safety, and the safety of others. Driving while impaired (DWI) can occur if a person is driving, operating, or in physical control of a car, motorcycle, boat, snowmobile, ATV, motorbike, off-road vehicle, or any other motor vehicle ([place] Statute 169A.20). I understand the risk if I choose to drive or operate any vehicle or machinery.    I understand that if I do not follow any of the conditions above, my prescriptions or treatment may be stopped or changed.          Opioids  What You Need to Know    What are opioids?   Opioids are pain medicines that must be prescribed by a doctor. They are also known as narcotics.     Examples are:   morphine (MS Contin, Caren)  oxycodone (Oxycontin)  oxycodone and acetaminophen (Percocet)  hydrocodone and acetaminophen (Vicodin, Norco)   fentanyl patch (Duragesic)   hydromorphone (Dilaudid)   methadone  codeine (Tylenol #3)     What do opioids do well?   Opioids are best for severe short-term pain such as after a surgery or injury. They may work well for cancer pain. They may help some people with long-lasting (chronic) pain.     What do opioids NOT do  well?   Opioids never get rid of pain entirely, and they don t work well for most patients with chronic pain. Opioids don t reduce swelling, one of the causes of pain.                                    Other ways to manage chronic pain and improve function include:     Treat the health problem that may be causing pain  Anti-inflammation medicines, which reduce swelling and tenderness, such as ibuprofen (Advil, Motrin) or naproxen (Aleve)  Acetaminophen (Tylenol)  Antidepressants and anti-seizure medicines, especially for nerve pain  Topical treatments such as patches or creams  Injections or nerve blocks  Chiropractic or osteopathic treatment  Acupuncture, massage, deep breathing, meditation, visual imagery, aromatherapy  Use heat or ice at the pain site  Physical therapy   Exercise  Stop smoking  Take part in therapy       Risks and side effects     Talk to your doctor before you start or decide to keep taking opioids. Possible side effects include:    Lowering your breathing rate enough to cause death  Overdose, including death, especially if taking higher than prescribed doses  Worse depression symptoms; less pleasure in things you usually enjoy  Feeling tired or sluggish  Slower thoughts or cloudy thinking  Being more sensitive to pain over time; pain is harder to control  Trouble sleeping or restless sleep  Changes in hormone levels (for example, less testosterone)  Changes in sex drive or ability to have sex  Constipation  Unsafe driving  Itching and sweating  Dizziness  Nausea, throwing up and dry mouth    What else should I know about opioids?    Opioids may lead to dependence, tolerance, or addiction.    Dependence means that if you stop or reduce the medicine too quickly, you will have withdrawal symptoms. These include loose poop (diarrhea), jitters, flu-like symptoms, nervousness and tremors. Dependence is not the same as addiction.                     Tolerance means needing higher doses over time to  get the same effect. This may increase the chance of serious side effects.    Addiction is when people improperly use a substance that harms their body, their mind or their relations with others. Use of opiates can cause a relapse of addiction if you have a history of drug or alcohol abuse.    People who have used opioids for a long time may have a lower quality of life, worse depression, higher levels of pain and more visits to doctors.    You can overdose on opioids. Take these steps to lower your risk of overdose:    Recognize the signs:  Signs of overdose include decrease or loss of consciousness (blackout), slowed breathing, trouble waking up and blue lips. If someone is worried about overdose, they should call 911.    Talk to your doctor about Narcan (naloxone).   If you are at risk for overdose, you may be given a prescription for Narcan. This medicine very quickly reverses the effects of opioids.   If you overdose, a friend or family member can give you Narcan while waiting for the ambulance. They need to know the signs of overdose and how to give Narcan.     Don't use alcohol or street drugs.   Taking them with opioids can cause death.    Do not take any of these medicines unless your doctor says it s OK. Taking these with opioids can cause death:  Benzodiazepines, such as lorazepam (Ativan), alprazolam (Xanax) or diazepam (Valium)  Muscle relaxers, such as cyclobenzaprine (Flexeril)  Sleeping pills like zolpidem (Ambien)   Other opioids      How to keep you and other people safe while taking opioids:    Never share your opioids with others.  Opioid medicines are regulated by the Drug Enforcement Agency ([place]). Selling or sharing medications is a criminal act.    2. Be sure to store opioids in a secure place, locked up if possible. Young children can easily swallow them and overdose.    3. When you are traveling with your medicines, keep them in the original bottles. If you use a pill box, be sure you also  carry a copy of your medicine list from your clinic or pharmacy.    4. Safe disposal of opioids    Most pharmacies have places to get rid of medicine, called disposal kiosks. Medicine disposal options are also available in every [facility]. Search your county and  medication disposal  to find more options. You can find more details at:  [email or web address]     I agree that my provider, clinic care team, and pharmacy may work with any city, state or federal law enforcement agency that investigates the misuse, sale, or other diversion of my controlled medicine. I will allow my provider to discuss my care with, or share a copy of, this agreement with any other treating provider, pharmacy or emergency room where I receive care.    I have read this agreement and have asked questions about anything I did not understand.    _______________________________________________________  Patient Signature - Xiomy Gan _____________________                   Date     _______________________________________________________  Provider Signature - Parish Méndez MD   _____________________                   Date     _______________________________________________________  Witness Signature (required if provider not present while patient signing)   _____________________                   Date

## 2023-05-10 NOTE — PROGRESS NOTES
"    The patient was provided with written information regarding signs of hearing loss.  Information on urinary incontinence and treatment options given to patient.  Answers for HPI/ROS submitted by the patient on 5/3/2023  In general, how would you rate your overall physical health?: good  Frequency of exercise:: 4-5 days/week  Do you usually eat at least 4 servings of fruit and vegetables a day, include whole grains & fiber, and avoid regularly eating high fat or \"junk\" foods? : Yes  Taking medications regularly:: Yes  Medication side effects:: Not applicable  Activities of Daily Living: no assistance needed  Home safety: lack of grab bars in the bathroom  Hearing Impairment:: difficulty following a conversation in a noisy restaurant or crowded room, need to ask people to speak up or repeat themselves  In the past 6 months, have you been bothered by leaking of urine?: Yes  abdominal pain: Yes  Blood in stool: No  Blood in urine: No  chest pain: No  chills: No  congestion: No  constipation: No  cough: No  diarrhea: No  dizziness: No  ear pain: No  eye pain: No  nervous/anxious: No  fever: No  frequency: No  genital sores: No  headaches: No  hearing loss: No  heartburn: No  arthralgias: No  joint swelling: No  peripheral edema: No  mood changes: No  myalgias: No  nausea: No  dysuria: No  palpitations: No  Skin sensation changes: No  sore throat: No  urgency: No  rash: No  shortness of breath: No  visual disturbance: No  weakness: No  pelvic pain: No  vaginal bleeding: No  vaginal discharge: No  tenderness: No  breast mass: No  breast discharge: No  In general, how would you rate your overall mental or emotional health?: excellent  Additional concerns today:: No  Duration of exercise:: 45-60 minutes      "

## 2023-05-10 NOTE — PATIENT INSTRUCTIONS
Patient Education   Personalized Prevention Plan  You are due for the preventive services outlined below.  Your care team is available to assist you in scheduling these services.  If you have already completed any of these items, please share that information with your care team to update in your medical record.  Health Maintenance Due   Topic Date Due     Annual Wellness Visit  02/26/2022       Signs of Hearing Loss  Hearing loss is a problem shared by many people. In fact, it's one of the most common health problems, particularly as people age. Most people aged 65 and older have some hearing loss. By age 80, almost everyone does. Hearing loss often occurs slowly over the years. So, you may not realize your hearing has gotten worse.   When sudden hearing loss occurs, it's important to contact your healthcare provider right away. Your provider will do a medical exam and a hearing exam as soon as possible. This is to help find the cause and type of your sudden hearing loss. Based on your diagnosis, your healthcare provider will discuss possible treatments.      Hearing much better with one ear can be a sign of hearing loss.     Have your hearing checked  Call your healthcare provider if you:     Have to strain to hear normal conversation    Have to watch other people s faces very carefully to follow what they re saying    Need to ask people to repeat what they ve said    Often misunderstand what people are saying    Turn the volume of the television or radio up so high that others complain    Feel that people are mumbling when they re talking to you    Find that the effort to hear leaves you feeling tired and irritated    Notice, when using the phone, that you hear better with one ear than the other  [employer] last reviewed this educational content on 6/1/2022 2000-2022 [employer]. All rights reserved. This information is not intended as a substitute for professional medical care. Always follow  your healthcare professional's instructions.          Urinary Incontinence, Female (Adult)   Urinary incontinence means loss of bladder control. This problem affects many women, especially as they get older. If you have incontinence, you may be embarrassed to ask for help. But know that this problem can be treated.   Types of Incontinence  There are different types of incontinence. Two of the main types are described here. You can have more than one type.     Stress incontinence. With this type, urine leaks when pressure (stress) is put on the bladder. This may happen when you cough, sneeze, or laugh. Stress incontinence most often occurs because the pelvic floor muscles that support the bladder and urethra are weak. This can happen after pregnancy and vaginal childbirth or a hysterectomy. It can also be due to excess body weight or hormone changes.    Urge incontinence (also called overactive bladder). With this type, a sudden urge to urinate is felt often. This may happen even though there may not be much urine in the bladder. The need to urinate often during the night is common. Urge incontinence most often occurs because of bladder spasms. This may be due to bladder irritation or infection. Damage to bladder nerves or pelvic muscles, constipation, and certain medicines can also lead to urge incontinence.  Treatment depends on the cause. Further evaluation is needed to find the type you have. This will likely include an exam and certain tests. Based on the results, you and your healthcare provider can then plan treatment. Until a diagnosis is made, the home care tips below can help ease symptoms.   Home care    Do pelvic floor muscle exercises, if they are prescribed. The pelvic floor muscles help support the bladder and urethra. Many women find that their symptoms improve when doing special exercises that strengthen these muscles. To do the exercises, contract the muscles you would use to stop your stream of  urine. But do this when you re not urinating. Hold for 10 seconds, then relax. Repeat 10 to 20 times in a row, at least 3 times a day. Your healthcare provider may give you other instructions for how to do the exercises and how often.    Keep a bladder diary. This helps track how often and how much you urinate over a set period of time. Bring this diary with you to your next visit with the provider. The information can help your provider learn more about your bladder problem.    Lose weight, if advised to by your provider. Extra weight puts pressure on the bladder. Your provider can help you create a weight-loss plan that s right for you. This may include exercising more and making certain diet changes.    Don't have foods and drinks that may irritate the bladder. These can include alcohol and caffeinated drinks.    Quit smoking. Smoking and other tobacco use can lead to a long-term (chronic) cough that strains the pelvic floor muscles. Smoking may also damage the bladder and urethra. Talk with your provider about treatments or methods you can use to quit smoking.    If drinking large amounts of fluid makes you have symptoms, you may be advised to limit your fluid intake. You may also be advised to drink most of your fluids during the day and to limit fluids at night.    If you re worried about urine leakage or accidents, you may wear absorbent pads to catch urine. Change the pads often. This helps reduce discomfort. It may also reduce the risk of skin or bladder infections.    Follow-up care  Follow up with your healthcare provider, or as directed. It may take some to find the right treatment for your problem. But healthy lifestyle changes can be made right away. These include such things as exercising on a regular basis, eating a healthy diet, losing weight (if needed), and quitting smoking. Your treatment plan may include special therapies or medicines. Certain procedures or surgery may also be options. Talk  about any questions you have with your provider.   When to seek medical advice  Call the healthcare provider right away if any of these occur:    Fever of 100.4 F (38 C) or higher, or as directed by your provider    Bladder pain or fullness    Belly swelling    Nausea or vomiting    Back pain    Weakness, dizziness, or fainting  [employer] last reviewed this educational content on 1/1/2020 2000-2022 [employer]. All rights reserved. This information is not intended as a substitute for professional medical care. Always follow your healthcare professional's instructions.

## 2023-05-10 NOTE — PROGRESS NOTES
"SUBJECTIVE:   Hue is a 72 year old who presents for Preventive Visit.      5/10/2023    12:04 PM   Additional Questions   Roomed by femi   Accompanied by alone         5/10/2023    12:04 PM   Patient Reported Additional Medications   Patient reports taking the following new medications none     Patient has been advised of split billing requirements and indicates understanding: Yes  Are you in the first 12 months of your Medicare coverage?  No    Healthy Habits:     In general, how would you rate your overall health?  Good    Frequency of exercise:  4-5 days/week    Duration of exercise:  45-60 minutes    Do you usually eat at least 4 servings of fruit and vegetables a day, include whole grains    & fiber and avoid regularly eating high fat or \"junk\" foods?  Yes    Taking medications regularly:  Yes    Medication side effects:  Not applicable    Ability to successfully perform activities of daily living:  No assistance needed    Home Safety:  Lack of grab bars in the bathroom    Hearing Impairment:  Difficulty following a conversation in a noisy restaurant or crowded room and need to ask people to speak up or repeat themselves    In the past 6 months, have you been bothered by leaking of urine? Yes    In general, how would you rate your overall mental or emotional health?  Excellent      PHQ-2 Total Score: 0    Additional concerns today:  No      Have you ever done Advance Care Planning? (For example, a Health Directive, POLST, or a discussion with a medical provider or your loved ones about your wishes): Yes, advance care planning is on file.       Fall risk  Fallen 2 or more times in the past year?: No  Any fall with injury in the past year?: No    Cognitive Screening Patient has no identifiable cognitive impairment based on our visit today through our conversation and examination     Do you have sleep apnea, excessive snoring or daytime drowsiness?: no    Reviewed and updated as needed this visit by clinical " staff   Tobacco  Allergies  Meds              Reviewed and updated as needed this visit by Provider                 Social History     Tobacco Use     Smoking status: Former     Packs/day: 0.50     Years: 20.00     Pack years: 10.00     Types: Cigarettes     Quit date: 1985     Years since quittin.3     Smokeless tobacco: Never   Vaping Use     Vaping status: Never Used   Substance Use Topics     Alcohol use: Yes     Alcohol/week: 2.0 - 3.0 standard drinks of alcohol             5/3/2023     6:44 PM   Alcohol Use   Prescreen: >3 drinks/day or >7 drinks/week? No     Do you have a current opioid prescription? Yes   How severe is your pain on a scale from 1-10? 4/10            View : No data to display.              Low Risk (0-3)  Moderate Risk (4-7)  High Risk (>8)  Do you use any other controlled substances or medications that are not prescribed by a provider? None              Current providers sharing in care for this patient include:   Patient Care Team:  Parish Méndez MD as PCP - General (Internal Medicine)  Kayley Johnson MD as Assigned Pain Medication Provider  Vitor Chew MD as Assigned PCP  Vitor Chew MD as Assigned Pain Medication Provider    The following health maintenance items are reviewed in Epic and correct as of today:  Health Maintenance   Topic Date Due     MEDICARE ANNUAL WELLNESS VISIT  2022     ANNUAL REVIEW OF HM ORDERS  2023     TSH W/FREE T4 REFLEX  2023     FALL RISK ASSESSMENT  05/10/2024     MAMMO SCREENING  2024     ADVANCE CARE PLANNING  2026     LIPID  2027     COLORECTAL CANCER SCREENING  12/15/2027     DTAP/TDAP/TD IMMUNIZATION (3 - Td or Tdap) 10/01/2031     DEXA  2036     HEPATITIS C SCREENING  Completed     PHQ-2 (once per calendar year)  Completed     INFLUENZA VACCINE  Completed     Pneumococcal Vaccine: 65+ Years  Completed     ZOSTER IMMUNIZATION  Completed     COVID-19 Vaccine  Completed     IPV  IMMUNIZATION  Aged Out     MENINGITIS IMMUNIZATION  Aged Out               Review of Systems   Constitutional: Negative for chills and fever.   HENT: Negative for congestion, ear pain, hearing loss and sore throat.    Eyes: Negative for pain and visual disturbance.   Respiratory: Negative for cough and shortness of breath.    Cardiovascular: Negative for chest pain, palpitations and peripheral edema.   Gastrointestinal: Positive for abdominal pain. Negative for constipation, diarrhea, heartburn, hematochezia and nausea.   Breasts:  Negative for tenderness, breast mass and discharge.   Genitourinary: Negative for dysuria, frequency, genital sores, hematuria, pelvic pain, urgency, vaginal bleeding and vaginal discharge.   Musculoskeletal: Negative for arthralgias, joint swelling and myalgias.   Skin: Negative for rash.   Neurological: Negative for dizziness, weakness, headaches and paresthesias.   Psychiatric/Behavioral: Negative for mood changes. The patient is not nervous/anxious.          OBJECTIVE:   /60 (BP Location: Left arm, Patient Position: Sitting, Cuff Size: Adult Regular)   Pulse 84   Temp 98.5  F (36.9  C) (Tympanic)   Resp 16   Ht 1.524 m (5')   Wt 72.5 kg (159 lb 12.8 oz)   LMP  (LMP Unknown)   SpO2 97%   BMI 31.21 kg/m   Estimated body mass index is 31.21 kg/m  as calculated from the following:    Height as of this encounter: 1.524 m (5').    Weight as of this encounter: 72.5 kg (159 lb 12.8 oz).  Physical Exam  GENERAL: healthy, alert and no distress  EYES: Eyes grossly normal to inspection, PERRL and conjunctivae and sclerae normal  HENT: ear canals and TM's normal, nose and mouth without ulcers or lesions  NECK: no adenopathy, no asymmetry, masses, or scars and thyroid normal to palpation  RESP: lungs clear to auscultation - no rales, rhonchi or wheezes  CV: regular rate and rhythm, normal S1 S2, no S3 or S4, no murmur, click or rub, no peripheral edema and peripheral pulses  strong  NEURO: Normal strength and tone, mentation intact and speech normal  PSYCH: mentation appears normal, affect normal/bright    Diagnostic Test Results:  Labs reviewed in Epic    ASSESSMENT / PLAN:   (Z00.00) Encounter for Medicare annual wellness exam  (primary encounter diagnosis)  Comment: normal  Plan:  items reviewed    (I10) Essential hypertension  Comment: controlled  Plan:         Will plan to continue on previous medication without changes     (I77.9) Right-sided carotid artery disease, unspecified type (H)  Comment: mild by latest CT angio  Plan: last check 2021. Consider rechecking in the coming year.    (E03.9) Acquired hypothyroidism  Comment: on replacement, labs okay  Plan: Will plan to continue on previous medication without changes     (F11.20) F11.2 - Episodic opioid dependence (H)  Comment: tramadol, #20 tabs will last about 2 months by history, OA main issue, see below  Plan: [id number] - Urine Drug Confirmation Panel         (Comprehensive)        Got up to date today with requirements, CSA, screening and Utox.    (M15.9) Primary osteoarthritis involving multiple joints  Comment: chronic pain, multifocal  Plan: methylPREDNISolone (MEDROL DOSEPAK) 4 MG tablet        therapy pack        Knee steroid injections through ortho. Would like to try oral steroids on occasion in place of more tramadol. This has helped in the past. Would not want to use this more than every 4-6 months.    (E78.00) Hypercholesterolemia  Comment: on statin  Plan: Lipid panel reflex to direct LDL Fasting        Will plan to continue on previous medication without changes     (I25.10,  I25.83) Coronary atherosclerosis due to lipid rich plaque  Comment: distant MI, asx for years.  Plan: on appropriate medication. Will plan to continue on previous medication without changes     (E66.9) Class 1 obesity in adult, unspecified BMI, unspecified obesity type, unspecified whether serious comorbidity present  Comment: stable  overall  Plan: remains physically active.           COUNSELING:  Reviewed preventive health counseling, as reflected in patient instructions      BMI:   Estimated body mass index is 31.21 kg/m  as calculated from the following:    Height as of this encounter: 1.524 m (5').    Weight as of this encounter: 72.5 kg (159 lb 12.8 oz).   Weight management plan: Discussed healthy diet and exercise guidelines      She reports that she quit smoking about 38 years ago. Her smoking use included cigarettes. She has a 10.00 pack-year smoking history. She has never used smokeless tobacco.      Appropriate preventive services were discussed with this patient, including applicable screening as appropriate for cardiovascular disease, diabetes, osteopenia/osteoporosis, and glaucoma.  As appropriate for age/gender, discussed screening for colorectal cancer, prostate cancer, breast cancer, and cervical cancer. Checklist reviewing preventive services available has been given to the patient.    Reviewed patients plan of care and provided an AVS. The Basic Care Plan (routine screening as documented in Health Maintenance) for Xiomy meets the Care Plan requirement. This Care Plan has been established and reviewed with the Patient.          Parish Méndez MD  [facility]    Identified Health Risks:    I have reviewed Opioid Use Disorder and Substance Use Disorder risk factors and made any needed referrals.

## 2023-05-12 LAB
GABAPENTIN UR QL CFM: PRESENT
N-NORTRAMADOL/CREAT UR CFM: 447 NG/MG {CREAT}
O-NORTRAMADOL UR CFM-MCNC: 210 NG/ML
TRAMADOL CTO UR CFM-MCNC: 90 NG/ML
TRAMADOL/CREAT UR: 191 NG/MG {CREAT}

## 2023-05-16 ENCOUNTER — MYC MEDICAL ADVICE (OUTPATIENT)
Dept: INTERNAL MEDICINE | Facility: CLINIC | Age: 73
End: 2023-05-16
Payer: COMMERCIAL

## 2023-05-16 ENCOUNTER — TRANSFERRED RECORDS (OUTPATIENT)
Dept: HEALTH INFORMATION MANAGEMENT | Facility: CLINIC | Age: 73
End: 2023-05-16
Payer: COMMERCIAL

## 2023-05-18 NOTE — TELEPHONE ENCOUNTER
5/10 - Urine Drug Screen results:      Component Ref Range & Units 8 d ago    O-Nolan-Tramadol ng/mL <50 ng/mL 210 High      O-Nolan-Tramadol Absent ng/mg  447    Comment: O-desmethyltramadol is an expected metabolite of tramadol.    Tramadol ng/mL <50 ng/mL 90 High      Tramadol Absent ng/mg  191    Comment: Sources of tramadol are scheduled prescription medications.    Gabapentin (Neurontin) Absent Present Abnormal     Comment: Sources of gabapentin are prescription medications.   Resulting Agency  [facility]

## 2023-05-19 ENCOUNTER — MYC MEDICAL ADVICE (OUTPATIENT)
Dept: INTERNAL MEDICINE | Facility: CLINIC | Age: 73
End: 2023-05-19
Payer: COMMERCIAL

## 2023-05-30 ENCOUNTER — TELEPHONE (OUTPATIENT)
Dept: INTERNAL MEDICINE | Facility: CLINIC | Age: 73
End: 2023-05-30
Payer: COMMERCIAL

## 2023-05-30 NOTE — TELEPHONE ENCOUNTER
General Call    Contacts       Type Contact Phone/Fax    05/30/2023 04:01 PM CDT Phone (Incoming) Hue Gan (Self) [phone] (H)        Reason for Call:  Pt is calling in to see if Dr Muñoz would take her on as a new pt.      What are your questions or concerns:  She is formally a Dr Johnson pt and would really like to stay with female dr.    Please advise    Date of last appointment with provider: na    Could we send this information to you in June [employer] or would you prefer to receive a phone call?:   Patient would prefer a phone call   Okay to leave a detailed message?: Yes at Home number on file [phone] (home)

## 2023-06-01 NOTE — TELEPHONE ENCOUNTER
I see she has just seen dr balir . Officially I am closed right now  but she can schedule her next wellness visit with me if she still wants to see me . . Prior to that if she has any concerns I am happy to see her

## 2023-06-12 DIAGNOSIS — M25.50 ARTHRALGIA, UNSPECIFIED JOINT: ICD-10-CM

## 2023-06-12 RX ORDER — GABAPENTIN 300 MG/1
300 CAPSULE ORAL AT BEDTIME
Qty: 90 CAPSULE | Refills: 0 | Status: SHIPPED | OUTPATIENT
Start: 2023-06-12 | End: 2023-08-27

## 2023-06-12 NOTE — TELEPHONE ENCOUNTER
Routing refill request to provider for review/approval because:  Drug not on the G refill protocol   A break in medication    Last Written Prescription Date:  2/11/2022  Last Fill Quantity: 90,  # refills: 3   Last office visit provider:  5/10/2023     Requested Prescriptions   Pending Prescriptions Disp Refills     gabapentin (NEURONTIN) 300 MG capsule 90 capsule 3     Sig: Take 1 capsule (300 mg) by mouth At Bedtime       There is no refill protocol information for this order          Keely Mariscal RN 06/12/23 2:24 PM

## 2023-08-10 ENCOUNTER — MYC MEDICAL ADVICE (OUTPATIENT)
Dept: INTERNAL MEDICINE | Facility: CLINIC | Age: 73
End: 2023-08-10
Payer: COMMERCIAL

## 2023-08-10 DIAGNOSIS — M15.0 PRIMARY OSTEOARTHRITIS INVOLVING MULTIPLE JOINTS: ICD-10-CM

## 2023-08-11 RX ORDER — TRAMADOL HYDROCHLORIDE 50 MG/1
50 TABLET ORAL EVERY 6 HOURS PRN
Qty: 20 TABLET | Refills: 3 | Status: SHIPPED | OUTPATIENT
Start: 2023-08-11 | End: 2024-05-10

## 2023-08-14 ENCOUNTER — ANCILLARY PROCEDURE (OUTPATIENT)
Dept: MAMMOGRAPHY | Facility: CLINIC | Age: 73
End: 2023-08-14
Attending: INTERNAL MEDICINE
Payer: COMMERCIAL

## 2023-08-14 DIAGNOSIS — Z12.31 SCREENING MAMMOGRAM, ENCOUNTER FOR: ICD-10-CM

## 2023-08-14 PROCEDURE — 77067 SCR MAMMO BI INCL CAD: CPT

## 2023-08-26 DIAGNOSIS — M25.50 ARTHRALGIA, UNSPECIFIED JOINT: ICD-10-CM

## 2023-08-27 RX ORDER — GABAPENTIN 300 MG/1
300 CAPSULE ORAL AT BEDTIME
Qty: 90 CAPSULE | Refills: 0 | Status: SHIPPED | OUTPATIENT
Start: 2023-08-27 | End: 2023-09-18

## 2023-09-08 ENCOUNTER — MYC MEDICAL ADVICE (OUTPATIENT)
Dept: INTERNAL MEDICINE | Facility: CLINIC | Age: 73
End: 2023-09-08
Payer: COMMERCIAL

## 2023-09-08 DIAGNOSIS — M94.9 DISORDER OF BONE AND CARTILAGE: Primary | ICD-10-CM

## 2023-09-08 DIAGNOSIS — M89.9 DISORDER OF BONE AND CARTILAGE: Primary | ICD-10-CM

## 2023-09-08 DIAGNOSIS — Z78.0 POST-MENOPAUSAL: ICD-10-CM

## 2023-09-14 ENCOUNTER — MYC MEDICAL ADVICE (OUTPATIENT)
Dept: INTERNAL MEDICINE | Facility: CLINIC | Age: 73
End: 2023-09-14

## 2023-09-14 ENCOUNTER — ANCILLARY PROCEDURE (OUTPATIENT)
Dept: BONE DENSITY | Facility: CLINIC | Age: 73
End: 2023-09-14
Attending: INTERNAL MEDICINE
Payer: COMMERCIAL

## 2023-09-14 DIAGNOSIS — Z78.0 MENOPAUSE: ICD-10-CM

## 2023-09-14 DIAGNOSIS — M89.9 DISORDER OF BONE AND CARTILAGE: ICD-10-CM

## 2023-09-14 DIAGNOSIS — Z78.0 POST-MENOPAUSAL: ICD-10-CM

## 2023-09-14 DIAGNOSIS — E78.00 HYPERCHOLESTEROLEMIA: ICD-10-CM

## 2023-09-14 DIAGNOSIS — M94.9 DISORDER OF BONE AND CARTILAGE: ICD-10-CM

## 2023-09-14 PROCEDURE — 77080 DXA BONE DENSITY AXIAL: CPT | Mod: TC | Performed by: PHYSICIAN ASSISTANT

## 2023-09-14 PROCEDURE — 77081 DXA BONE DENSITY APPENDICULR: CPT | Mod: TC | Performed by: PHYSICIAN ASSISTANT

## 2023-09-15 RX ORDER — ROSUVASTATIN CALCIUM 40 MG/1
40 TABLET, COATED ORAL DAILY
Qty: 90 TABLET | Refills: 3 | Status: SHIPPED | OUTPATIENT
Start: 2023-09-15 | End: 2024-08-26

## 2023-09-15 NOTE — TELEPHONE ENCOUNTER
"Last Written Prescription Date:  06/17/22  Last Fill Quantity: 90,  # refills: 3   Last office visit provider:  05/10/23 with Dr. Méndez.      Requested Prescriptions   Pending Prescriptions Disp Refills    rosuvastatin (CRESTOR) 40 MG tablet 90 tablet 3     Sig: Take 1 tablet (40 mg) by mouth daily       Statins Protocol Passed - 9/15/2023  9:01 AM        Passed - LDL on file in past 12 months     Recent Labs   Lab Test 05/10/23  1313   LDL 66             Passed - No abnormal creatine kinase in past 12 months     No lab results found.             Passed - Recent (12 mo) or future (30 days) visit within the authorizing provider's specialty     Patient has had an office visit with the authorizing provider or a provider within the authorizing providers department within the previous 12 mos or has a future within next 30 days. See \"Patient Info\" tab in inbasket, or \"Choose Columns\" in Meds & Orders section of the refill encounter.              Passed - Medication is active on med list        Passed - Patient is age 18 or older        Passed - No active pregnancy on record        Passed - No positive pregnancy test in past 12 months             Yeimi Almonte RN 09/15/23 12:28 PM  "

## 2023-09-17 DIAGNOSIS — M25.50 ARTHRALGIA, UNSPECIFIED JOINT: ICD-10-CM

## 2023-09-18 RX ORDER — GABAPENTIN 300 MG/1
300 CAPSULE ORAL AT BEDTIME
Qty: 90 CAPSULE | Refills: 0 | Status: SHIPPED | OUTPATIENT
Start: 2023-09-18 | End: 2024-01-23

## 2023-09-27 ENCOUNTER — MYC MEDICAL ADVICE (OUTPATIENT)
Dept: INTERNAL MEDICINE | Facility: CLINIC | Age: 73
End: 2023-09-27
Payer: COMMERCIAL

## 2023-09-27 DIAGNOSIS — N95.2 ATROPHIC VAGINITIS: ICD-10-CM

## 2023-09-28 RX ORDER — ESTRADIOL 0.1 MG/G
CREAM VAGINAL
Qty: 42.5 G | Refills: 3 | Status: SHIPPED | OUTPATIENT
Start: 2023-09-28 | End: 2024-05-10

## 2023-10-17 ENCOUNTER — TRANSFERRED RECORDS (OUTPATIENT)
Dept: HEALTH INFORMATION MANAGEMENT | Facility: CLINIC | Age: 73
End: 2023-10-17
Payer: COMMERCIAL

## 2023-10-19 ENCOUNTER — OFFICE VISIT (OUTPATIENT)
Dept: INTERNAL MEDICINE | Facility: CLINIC | Age: 73
End: 2023-10-19
Payer: COMMERCIAL

## 2023-10-19 VITALS
BODY MASS INDEX: 30.29 KG/M2 | RESPIRATION RATE: 18 BRPM | OXYGEN SATURATION: 97 % | HEIGHT: 61 IN | SYSTOLIC BLOOD PRESSURE: 143 MMHG | TEMPERATURE: 98.1 F | HEART RATE: 76 BPM | WEIGHT: 160.4 LBS | DIASTOLIC BLOOD PRESSURE: 82 MMHG

## 2023-10-19 DIAGNOSIS — M81.0 AGE-RELATED OSTEOPOROSIS WITHOUT CURRENT PATHOLOGICAL FRACTURE: ICD-10-CM

## 2023-10-19 DIAGNOSIS — Z00.00 HEALTHCARE MAINTENANCE: ICD-10-CM

## 2023-10-19 DIAGNOSIS — Z29.11 NEED FOR VACCINATION AGAINST RESPIRATORY SYNCYTIAL VIRUS: ICD-10-CM

## 2023-10-19 DIAGNOSIS — Z23 NEED FOR PROPHYLACTIC VACCINATION AGAINST HEPATITIS A: ICD-10-CM

## 2023-10-19 DIAGNOSIS — E03.9 HYPOTHYROIDISM, UNSPECIFIED TYPE: Primary | ICD-10-CM

## 2023-10-19 DIAGNOSIS — I25.83 CORONARY ATHEROSCLEROSIS DUE TO LIPID RICH PLAQUE: ICD-10-CM

## 2023-10-19 DIAGNOSIS — I10 ESSENTIAL HYPERTENSION: ICD-10-CM

## 2023-10-19 DIAGNOSIS — K80.20 CALCULUS OF GALLBLADDER WITHOUT CHOLECYSTITIS WITHOUT OBSTRUCTION: ICD-10-CM

## 2023-10-19 LAB
ALBUMIN SERPL BCG-MCNC: 4.9 G/DL (ref 3.5–5.2)
ALP SERPL-CCNC: 57 U/L (ref 35–104)
ALT SERPL W P-5'-P-CCNC: 43 U/L (ref 0–50)
ANION GAP SERPL CALCULATED.3IONS-SCNC: 12 MMOL/L (ref 7–15)
AST SERPL W P-5'-P-CCNC: 43 U/L (ref 0–45)
BILIRUB SERPL-MCNC: 0.4 MG/DL
BUN SERPL-MCNC: 15.2 MG/DL (ref 8–23)
CALCIUM SERPL-MCNC: 9.6 MG/DL (ref 8.8–10.2)
CHLORIDE SERPL-SCNC: 102 MMOL/L (ref 98–107)
CHOLEST SERPL-MCNC: 163 MG/DL
CREAT SERPL-MCNC: 0.57 MG/DL (ref 0.51–0.95)
DEPRECATED HCO3 PLAS-SCNC: 24 MMOL/L (ref 22–29)
EGFRCR SERPLBLD CKD-EPI 2021: >90 ML/MIN/1.73M2
GLUCOSE SERPL-MCNC: 116 MG/DL (ref 70–99)
HBA1C MFR BLD: 5.7 % (ref 0–5.6)
HDLC SERPL-MCNC: 58 MG/DL
LDLC SERPL CALC-MCNC: 68 MG/DL
NONHDLC SERPL-MCNC: 105 MG/DL
POTASSIUM SERPL-SCNC: 4.7 MMOL/L (ref 3.4–5.3)
PROT SERPL-MCNC: 7.2 G/DL (ref 6.4–8.3)
PTH-INTACT SERPL-MCNC: 32 PG/ML (ref 15–65)
SODIUM SERPL-SCNC: 138 MMOL/L (ref 135–145)
TOTAL PROTEIN SERUM FOR ELP: 7 G/DL (ref 6.4–8.3)
TRIGL SERPL-MCNC: 183 MG/DL
TSH SERPL DL<=0.005 MIU/L-ACNC: 1.66 UIU/ML (ref 0.3–4.2)
VIT D+METAB SERPL-MCNC: 66 NG/ML (ref 20–50)

## 2023-10-19 PROCEDURE — 80053 COMPREHEN METABOLIC PANEL: CPT | Performed by: INTERNAL MEDICINE

## 2023-10-19 PROCEDURE — 36415 COLL VENOUS BLD VENIPUNCTURE: CPT | Performed by: INTERNAL MEDICINE

## 2023-10-19 PROCEDURE — 80061 LIPID PANEL: CPT | Performed by: INTERNAL MEDICINE

## 2023-10-19 PROCEDURE — 82306 VITAMIN D 25 HYDROXY: CPT | Performed by: INTERNAL MEDICINE

## 2023-10-19 PROCEDURE — 84443 ASSAY THYROID STIM HORMONE: CPT | Performed by: INTERNAL MEDICINE

## 2023-10-19 PROCEDURE — 83036 HEMOGLOBIN GLYCOSYLATED A1C: CPT | Performed by: INTERNAL MEDICINE

## 2023-10-19 PROCEDURE — 83970 ASSAY OF PARATHORMONE: CPT | Performed by: INTERNAL MEDICINE

## 2023-10-19 PROCEDURE — 84165 PROTEIN E-PHORESIS SERUM: CPT | Performed by: PATHOLOGY

## 2023-10-19 PROCEDURE — 99214 OFFICE O/P EST MOD 30 MIN: CPT | Performed by: INTERNAL MEDICINE

## 2023-10-19 PROCEDURE — 84155 ASSAY OF PROTEIN SERUM: CPT | Mod: 59 | Performed by: INTERNAL MEDICINE

## 2023-10-19 RX ORDER — RESPIRATORY SYNCYTIAL VIRUS VACCINE 120MCG/0.5
0.5 KIT INTRAMUSCULAR ONCE
Qty: 1 EACH | Refills: 0 | Status: CANCELLED | OUTPATIENT
Start: 2023-10-19 | End: 2023-10-19

## 2023-10-19 RX ORDER — ALENDRONATE SODIUM 70 MG/1
70 TABLET ORAL
Qty: 12 TABLET | Refills: 3 | Status: SHIPPED | OUTPATIENT
Start: 2023-10-19 | End: 2024-05-10

## 2023-10-19 NOTE — PROGRESS NOTES
Assessment & Plan     Need for prophylactic vaccination against hepatitis A      Need for vaccination against respiratory syncytial virus      Hypothyroidism, unspecified type  TSH   Date Value Ref Range Status   10/19/2023 1.66 0.30 - 4.20 uIU/mL Final   06/17/2022 2.47 0.30 - 5.00 uIU/mL Final     Excellent control no concerns  - TSH WITH FREE T4 REFLEX; Future  - TSH WITH FREE T4 REFLEX    Essential hypertension  slightly elevated blood pressure but she is a bit anxious.  We will check home readings  - Hemoglobin A1c; Future  - Lipid panel reflex to direct LDL Fasting; Future  - Comprehensive metabolic panel; Future  - TSH; Future  - Vitamin D Deficiency; Future  - Parathyroid Hormone Intact; Future  - Protein Electrophoresis with IELP Reflex; Future  - Hemoglobin A1c  - Lipid panel reflex to direct LDL Fasting  - Comprehensive metabolic panel  - Vitamin D Deficiency  - Parathyroid Hormone Intact  - Protein Electrophoresis with IELP Reflex    Calculus of gallbladder without cholecystitis without obstruction      Coronary atherosclerosis due to lipid rich plaque    She is on Crestor and takes baby aspirin.  Age-related osteoporosis without current pathological fracture  She has not been on medication for osteoporosis do recommend alendronate on reviewing her last bone density scan discussed the risks and benefits and other agents and gave her information.  - alendronate (FOSAMAX) 70 MG tablet; Take 1 tablet (70 mg) by mouth every 7 days Empty stomach 8 Oz of water stay upright for half an hour  Health maintenance reviewed         Colon 2017 hyperplastic polyp , next due in ten years   Mammo annual  Dexa 2023      Chiquita Muñoz MD  [facility]   Hue is a 73 year old, presenting for the following health issues:  She is here to establish care former Dr. Johnson  patient  Ten years ago stent was out on , Dr baird   Goes to the gym , swim yoga ,   Colon 2017 next in ten  years   Took fosamax in the past but then stopped taking it only had taken it for 1 year.    TSH   Date Value Ref Range Status   11/09/2022 1.32 0.30 - 4.20 uIU/mL Final   06/17/2022 2.47 0.30 - 5.00 uIU/mL Final   Echo 2018 63 %     office visit , established care , and Recheck Medication (Pt is here for office visit )      10/19/2023     7:53 AM   Additional Questions   Roomed by corine         10/19/2023     7:53 AM   Patient Reported Additional Medications   Patient reports taking the following new medications no       History of Present Illness       Hyperlipidemia:  She presents for follow up of hyperlipidemia.   She is taking medication to lower cholesterol. She is not having myalgia or other side effects to statin medications.    Hypertension: She presents for follow up of hypertension.  She does not check blood pressure  regularly outside of the clinic. Outpatient blood pressures have not been over 140/90. She does not follow a low salt diet.     Hypothyroidism:     Since last visit, patient describes the following symptoms::  None    Reason for visit:  F/U with health conditions and recent bone density evaluation    She eats 2-3 servings of fruits and vegetables daily.She consumes 0 sweetened beverage(s) daily.She exercises with enough effort to increase her heart rate 30 to 60 minutes per day.  She exercises with enough effort to increase her heart rate 5 days per week.   She is taking medications regularly.           Current Outpatient Medications   Medication    acetaminophen (TYLENOL) 500 MG tablet    alendronate (FOSAMAX) 70 MG tablet    ASPIRIN (ASPIR-81 ORAL)    calcium citrate-vitamin D (CITRACAL) 315-200 MG-UNIT TABS per tablet    estradiol (ESTRACE) 0.1 MG/GM vaginal cream    famotidine (PEPCID) 20 MG tablet    gabapentin (NEURONTIN) 300 MG capsule    levothyroxine (SYNTHROID/LEVOTHROID) 88 MCG tablet    lisinopril (ZESTRIL) 10 MG tablet    metoprolol succinate ER (TOPROL XL) 50 MG 24 hr  "tablet    multivitamin, therapeutic (THERA-VIT) TABS tablet    rosuvastatin (CRESTOR) 40 MG tablet    traMADol (ULTRAM) 50 MG tablet    Vitamin D, Cholecalciferol, 25 MCG (1000 UT) TABS     No current facility-administered medications for this visit.           Review of Systems   Constitutional, HEENT, cardiovascular, pulmonary, gi and gu systems are negative, except as otherwise noted.      Objective    BP (!) 143/82   Pulse 76   Temp 98.1  F (36.7  C) (Oral)   Resp 18   Ht 1.549 m (5' 1\")   Wt 72.8 kg (160 lb 6.4 oz)   LMP  (LMP Unknown)   SpO2 97%   BMI 30.31 kg/m    Body mass index is 30.31 kg/m .  Physical Exam   GENERAL: healthy, alert and no distress  NECK: no adenopathy, no asymmetry, masses, or scars and thyroid normal to palpation  RESP: lungs clear to auscultation - no rales, rhonchi or wheezes  CV: regular rate and rhythm, normal S1 S2, no S3 or S4, no murmur, click or rub, no peripheral edema and peripheral pulses strong  ABDOMEN: soft, nontender, no hepatosplenomegaly, no masses and bowel sounds normal  MS: no gross musculoskeletal defects noted, no edema                      "

## 2023-10-22 PROBLEM — Z00.00 HEALTHCARE MAINTENANCE: Status: ACTIVE | Noted: 2023-10-22

## 2023-10-23 LAB
ALBUMIN SERPL ELPH-MCNC: 4.4 G/DL (ref 3.7–5.1)
ALPHA1 GLOB SERPL ELPH-MCNC: 0.3 G/DL (ref 0.2–0.4)
ALPHA2 GLOB SERPL ELPH-MCNC: 0.7 G/DL (ref 0.5–0.9)
B-GLOBULIN SERPL ELPH-MCNC: 0.9 G/DL (ref 0.6–1)
GAMMA GLOB SERPL ELPH-MCNC: 0.7 G/DL (ref 0.7–1.6)
M PROTEIN SERPL ELPH-MCNC: 0 G/DL
PROT PATTERN SERPL ELPH-IMP: NORMAL

## 2023-10-26 ENCOUNTER — TRANSFERRED RECORDS (OUTPATIENT)
Dept: HEALTH INFORMATION MANAGEMENT | Facility: CLINIC | Age: 73
End: 2023-10-26
Payer: COMMERCIAL

## 2023-10-27 ENCOUNTER — TELEPHONE (OUTPATIENT)
Dept: INTERNAL MEDICINE | Facility: CLINIC | Age: 73
End: 2023-10-27
Payer: COMMERCIAL

## 2023-10-27 NOTE — TELEPHONE ENCOUNTER
October 27, 2023    Outside records received from [facility] [place].  Records were placed in the inbox for Dr. Muñoz to review.  A copy was sent to HIM to be scanned into the patient's chart.    Kerrie Hurley

## 2023-11-04 ENCOUNTER — MYC MEDICAL ADVICE (OUTPATIENT)
Dept: INTERNAL MEDICINE | Facility: CLINIC | Age: 73
End: 2023-11-04
Payer: COMMERCIAL

## 2023-11-06 DIAGNOSIS — E03.9 ACQUIRED HYPOTHYROIDISM: ICD-10-CM

## 2023-11-07 RX ORDER — METOPROLOL SUCCINATE 50 MG/1
50 TABLET, EXTENDED RELEASE ORAL DAILY
Qty: 90 TABLET | Refills: 3 | Status: SHIPPED | OUTPATIENT
Start: 2023-11-07

## 2023-11-16 DIAGNOSIS — E03.9 ACQUIRED HYPOTHYROIDISM: ICD-10-CM

## 2023-11-17 ENCOUNTER — TELEPHONE (OUTPATIENT)
Dept: INTERNAL MEDICINE | Facility: CLINIC | Age: 73
End: 2023-11-17
Payer: COMMERCIAL

## 2023-11-17 RX ORDER — LISINOPRIL 10 MG/1
10 TABLET ORAL DAILY
Qty: 90 TABLET | Refills: 3 | Status: SHIPPED | OUTPATIENT
Start: 2023-11-17

## 2023-11-17 NOTE — TELEPHONE ENCOUNTER
Attempted to contact patient.  A brief message was left with writer's name and direct dial number.    Kerry Baldwin M.A., LPN  Clinic Manager  [facility]

## 2023-11-17 NOTE — TELEPHONE ENCOUNTER
General Call    Contacts         Type Contact Phone/Fax    11/17/2023 11:30 AM CST Phone (Incoming) Hue Gan (Self) [phone] (M)          Reason for Call:  complaint and would like call from Manager    What are your questions or concerns:  pt  has had many issues with getting refills from the office once Dr Johnson left.  Most recent is the lisinopril.  She says this is not the first time and why are we not covering for Dr Lombardi refills to get to Dr Muñoz.  I advised that we are still covering and would get to your new PCP.  She says pharmacy sent us several requests and we are not responding to them.  I said we just got request yesterday for that medication and we are waiting to Dr Muñoz to sign off.  She said she requested medication refill on 11/2/23 from the pharmacy and we didn't respond.    Please call patient.    Date of last appointment with provider: 10/19/23    Could we send this information to you in [place] or would you prefer to receive a phone call?:   Patient would prefer a phone call   Okay to leave a detailed message?: No at Cell number on file:    Telephone Information:   Mobile [phone]

## 2023-11-20 NOTE — TELEPHONE ENCOUNTER
Patient was contacted and provided with an apology that her expectations in transferring providers has not been met.  Patient was advised to contact writer directly if there are any concerns with future refills so that additional review can be done as appropriate.  Patient expressed appreciation for the outreach and is in agreement with the plan as discussed.    Kerry Baldwin M.A., LPN  Clinic Manager  [facility]

## 2024-01-23 DIAGNOSIS — M25.50 ARTHRALGIA, UNSPECIFIED JOINT: ICD-10-CM

## 2024-01-23 RX ORDER — GABAPENTIN 300 MG/1
300 CAPSULE ORAL AT BEDTIME
Qty: 90 CAPSULE | Refills: 3 | Status: SHIPPED | OUTPATIENT
Start: 2024-01-23

## 2024-02-12 ENCOUNTER — MYC MEDICAL ADVICE (OUTPATIENT)
Dept: INTERNAL MEDICINE | Facility: CLINIC | Age: 74
End: 2024-02-12
Payer: COMMERCIAL

## 2024-02-12 NOTE — TELEPHONE ENCOUNTER
----- Message from Chiquita Muñoz MD sent at 2/12/2024  8:24 AM CST -----  Please let patient know ( she sent a mychart message) to rescheudle her April visit  for anytime after may 10th and have it booked as a wellness visit . She did an establish care with me in October and should continue with wellness visits once a year to keep her continuity of care going doing another regular office visit  is not needed unless she has a problem she wants to discuss

## 2024-03-15 ENCOUNTER — MYC MEDICAL ADVICE (OUTPATIENT)
Dept: INTERNAL MEDICINE | Facility: CLINIC | Age: 74
End: 2024-03-15
Payer: COMMERCIAL

## 2024-03-19 DIAGNOSIS — M15.0 PRIMARY OSTEOARTHRITIS INVOLVING MULTIPLE JOINTS: ICD-10-CM

## 2024-03-19 DIAGNOSIS — M25.50 ARTHRALGIA, UNSPECIFIED JOINT: Primary | ICD-10-CM

## 2024-03-19 RX ORDER — PREDNISONE 20 MG/1
20 TABLET ORAL EVERY MORNING
Qty: 4 TABLET | Refills: 0 | Status: SHIPPED | OUTPATIENT
Start: 2024-03-19 | End: 2024-03-23

## 2024-04-04 ENCOUNTER — MYC MEDICAL ADVICE (OUTPATIENT)
Dept: INTERNAL MEDICINE | Facility: CLINIC | Age: 74
End: 2024-04-04
Payer: COMMERCIAL

## 2024-04-24 ENCOUNTER — TRANSFERRED RECORDS (OUTPATIENT)
Dept: HEALTH INFORMATION MANAGEMENT | Facility: CLINIC | Age: 74
End: 2024-04-24
Payer: COMMERCIAL

## 2024-04-25 DIAGNOSIS — E03.9 ACQUIRED HYPOTHYROIDISM: ICD-10-CM

## 2024-04-26 RX ORDER — LEVOTHYROXINE SODIUM 88 UG/1
88 TABLET ORAL DAILY
Qty: 90 TABLET | Refills: 0 | Status: SHIPPED | OUTPATIENT
Start: 2024-04-26 | End: 2024-07-29

## 2024-05-03 SDOH — HEALTH STABILITY: PHYSICAL HEALTH: ON AVERAGE, HOW MANY DAYS PER WEEK DO YOU ENGAGE IN MODERATE TO STRENUOUS EXERCISE (LIKE A BRISK WALK)?: 5 DAYS

## 2024-05-03 SDOH — HEALTH STABILITY: PHYSICAL HEALTH: ON AVERAGE, HOW MANY MINUTES DO YOU ENGAGE IN EXERCISE AT THIS LEVEL?: 50 MIN

## 2024-05-03 ASSESSMENT — SOCIAL DETERMINANTS OF HEALTH (SDOH): HOW OFTEN DO YOU GET TOGETHER WITH FRIENDS OR RELATIVES?: MORE THAN THREE TIMES A WEEK

## 2024-05-10 ENCOUNTER — OFFICE VISIT (OUTPATIENT)
Dept: INTERNAL MEDICINE | Facility: CLINIC | Age: 74
End: 2024-05-10
Payer: COMMERCIAL

## 2024-05-10 VITALS
WEIGHT: 160.4 LBS | SYSTOLIC BLOOD PRESSURE: 132 MMHG | HEART RATE: 63 BPM | OXYGEN SATURATION: 98 % | HEIGHT: 60 IN | RESPIRATION RATE: 18 BRPM | BODY MASS INDEX: 31.49 KG/M2 | TEMPERATURE: 97.5 F | DIASTOLIC BLOOD PRESSURE: 72 MMHG

## 2024-05-10 DIAGNOSIS — F11.20 EPISODIC OPIOID DEPENDENCE (H): ICD-10-CM

## 2024-05-10 DIAGNOSIS — I65.29 CAROTID ATHEROSCLEROSIS, UNSPECIFIED LATERALITY: ICD-10-CM

## 2024-05-10 DIAGNOSIS — M11.262 PSEUDOGOUT OF KNEE, LEFT: ICD-10-CM

## 2024-05-10 DIAGNOSIS — Z00.00 HEALTHCARE MAINTENANCE: ICD-10-CM

## 2024-05-10 DIAGNOSIS — R25.3 EYE MUSCLE TWITCHES: ICD-10-CM

## 2024-05-10 DIAGNOSIS — R63.5 WEIGHT GAIN: ICD-10-CM

## 2024-05-10 DIAGNOSIS — M15.0 PRIMARY OSTEOARTHRITIS INVOLVING MULTIPLE JOINTS: ICD-10-CM

## 2024-05-10 DIAGNOSIS — E03.9 ACQUIRED HYPOTHYROIDISM: ICD-10-CM

## 2024-05-10 DIAGNOSIS — R06.02 SOB (SHORTNESS OF BREATH) ON EXERTION: ICD-10-CM

## 2024-05-10 DIAGNOSIS — N39.41 URGENCY INCONTINENCE: ICD-10-CM

## 2024-05-10 DIAGNOSIS — M81.0 OSTEOPOROSIS, UNSPECIFIED OSTEOPOROSIS TYPE, UNSPECIFIED PATHOLOGICAL FRACTURE PRESENCE: ICD-10-CM

## 2024-05-10 DIAGNOSIS — R73.03 PREDIABETES: ICD-10-CM

## 2024-05-10 DIAGNOSIS — R79.9 ABNORMAL FINDING OF BLOOD CHEMISTRY, UNSPECIFIED: ICD-10-CM

## 2024-05-10 DIAGNOSIS — M62.89 PELVIC FLOOR DYSFUNCTION: Primary | ICD-10-CM

## 2024-05-10 DIAGNOSIS — I77.9 RIGHT-SIDED CAROTID ARTERY DISEASE, UNSPECIFIED TYPE (H): ICD-10-CM

## 2024-05-10 LAB
ALBUMIN UR-MCNC: NEGATIVE MG/DL
AMPHETAMINES UR QL SCN: NORMAL
APPEARANCE UR: CLEAR
BARBITURATES UR QL SCN: NORMAL
BENZODIAZ UR QL SCN: NORMAL
BILIRUB UR QL STRIP: NEGATIVE
BZE UR QL SCN: NORMAL
CANNABINOIDS UR QL SCN: NORMAL
COLOR UR AUTO: YELLOW
FENTANYL UR QL: NORMAL
GLUCOSE UR STRIP-MCNC: NEGATIVE MG/DL
HBA1C MFR BLD: 5.9 % (ref 0–5.6)
HGB UR QL STRIP: NEGATIVE
KETONES UR STRIP-MCNC: NEGATIVE MG/DL
LEUKOCYTE ESTERASE UR QL STRIP: NEGATIVE
NITRATE UR QL: NEGATIVE
OPIATES UR QL SCN: NORMAL
PCP QUAL URINE (ROCHE): NORMAL
PH UR STRIP: 6 [PH] (ref 5–8)
SP GR UR STRIP: 1.01 (ref 1–1.03)
UROBILINOGEN UR STRIP-ACNC: 0.2 E.U./DL

## 2024-05-10 PROCEDURE — 81003 URINALYSIS AUTO W/O SCOPE: CPT | Mod: 59 | Performed by: INTERNAL MEDICINE

## 2024-05-10 PROCEDURE — 80061 LIPID PANEL: CPT | Performed by: INTERNAL MEDICINE

## 2024-05-10 PROCEDURE — 84443 ASSAY THYROID STIM HORMONE: CPT | Performed by: INTERNAL MEDICINE

## 2024-05-10 PROCEDURE — G0439 PPPS, SUBSEQ VISIT: HCPCS | Performed by: INTERNAL MEDICINE

## 2024-05-10 PROCEDURE — 84481 FREE ASSAY (FT-3): CPT | Performed by: INTERNAL MEDICINE

## 2024-05-10 PROCEDURE — 82306 VITAMIN D 25 HYDROXY: CPT | Performed by: INTERNAL MEDICINE

## 2024-05-10 PROCEDURE — 82570 ASSAY OF URINE CREATININE: CPT | Mod: 59 | Performed by: INTERNAL MEDICINE

## 2024-05-10 PROCEDURE — 80307 DRUG TEST PRSMV CHEM ANLYZR: CPT | Performed by: INTERNAL MEDICINE

## 2024-05-10 PROCEDURE — 99214 OFFICE O/P EST MOD 30 MIN: CPT | Mod: 25 | Performed by: INTERNAL MEDICINE

## 2024-05-10 PROCEDURE — 82043 UR ALBUMIN QUANTITATIVE: CPT | Performed by: INTERNAL MEDICINE

## 2024-05-10 PROCEDURE — 36415 COLL VENOUS BLD VENIPUNCTURE: CPT | Performed by: INTERNAL MEDICINE

## 2024-05-10 PROCEDURE — 84439 ASSAY OF FREE THYROXINE: CPT | Performed by: INTERNAL MEDICINE

## 2024-05-10 PROCEDURE — 83036 HEMOGLOBIN GLYCOSYLATED A1C: CPT | Performed by: INTERNAL MEDICINE

## 2024-05-10 RX ORDER — IBANDRONATE SODIUM 150 MG/1
150 TABLET, FILM COATED ORAL
Qty: 12 TABLET | Refills: 0 | Status: SHIPPED | OUTPATIENT
Start: 2024-05-10

## 2024-05-10 RX ORDER — TRAMADOL HYDROCHLORIDE 50 MG/1
50 TABLET ORAL EVERY 6 HOURS PRN
Qty: 20 TABLET | Refills: 3 | Status: SHIPPED | OUTPATIENT
Start: 2024-05-10

## 2024-05-10 RX ORDER — OXYBUTYNIN CHLORIDE 5 MG/1
5 TABLET, EXTENDED RELEASE ORAL DAILY
Qty: 90 TABLET | Refills: 0 | Status: SHIPPED | OUTPATIENT
Start: 2024-05-10 | End: 2024-08-05

## 2024-05-10 ASSESSMENT — PATIENT HEALTH QUESTIONNAIRE - PHQ9
SUM OF ALL RESPONSES TO PHQ QUESTIONS 1-9: 0
SUM OF ALL RESPONSES TO PHQ QUESTIONS 1-9: 0

## 2024-05-10 ASSESSMENT — ANXIETY QUESTIONNAIRES
GAD7 TOTAL SCORE: 1
2. NOT BEING ABLE TO STOP OR CONTROL WORRYING: NOT AT ALL
5. BEING SO RESTLESS THAT IT IS HARD TO SIT STILL: NOT AT ALL
4. TROUBLE RELAXING: NOT AT ALL
IF YOU CHECKED OFF ANY PROBLEMS ON THIS QUESTIONNAIRE, HOW DIFFICULT HAVE THESE PROBLEMS MADE IT FOR YOU TO DO YOUR WORK, TAKE CARE OF THINGS AT HOME, OR GET ALONG WITH OTHER PEOPLE: NOT DIFFICULT AT ALL
6. BECOMING EASILY ANNOYED OR IRRITABLE: SEVERAL DAYS
GAD7 TOTAL SCORE: 1
3. WORRYING TOO MUCH ABOUT DIFFERENT THINGS: NOT AT ALL
1. FEELING NERVOUS, ANXIOUS, OR ON EDGE: NOT AT ALL
7. FEELING AFRAID AS IF SOMETHING AWFUL MIGHT HAPPEN: NOT AT ALL
7. FEELING AFRAID AS IF SOMETHING AWFUL MIGHT HAPPEN: NOT AT ALL
8. IF YOU CHECKED OFF ANY PROBLEMS, HOW DIFFICULT HAVE THESE MADE IT FOR YOU TO DO YOUR WORK, TAKE CARE OF THINGS AT HOME, OR GET ALONG WITH OTHER PEOPLE?: NOT DIFFICULT AT ALL
GAD7 TOTAL SCORE: 1

## 2024-05-10 NOTE — PATIENT INSTRUCTIONS
If you tolerate lower dose of oxybutynin then after 4 weeks we can go up to 10mg if the 5mg . Take it once daily     Metamucil fibre every day   August is your mammo month   Colonoscopy not due till 2027  Hold your metoprolol 24 hours before stress test

## 2024-05-10 NOTE — PROGRESS NOTES
Preventive Care Visit  [facility]  Chiquita Muñoz MD, Internal Medicine  May 10, 2024      Assessment & Plan     Pelvic floor dysfunction  Went through pelvic floor exercises and didn't find it helpful     Healthcare maintenance  Reviewed and up to date     Episodic opioid dependence (H)  Sparing use of tramdol has a h/o pseuodgout . Will sign opoid contract today   - Urine Drug Screen; Future    Right-sided carotid artery disease, unspecified type (H24)  Followed by Dr daniels with annual CT neck , mild to moderate carotid artery plaque     Eye muscle twitches  Not seen today , no signs of fasciculations elsewhere , no other neurological problem .  Fatigue ,sleep apna , anxiety can be common causes   Urgency incontinence  No night time symptoms but bothers her in the day   she is willing to try a medication explained sied effects . Dose cna be incremented if needed   - oxyBUTYnin ER (DITROPAN XL) 5 MG 24 hr tablet; Take 1 tablet (5 mg) by mouth daily  - UA Macroscopic with reflex to Microscopic and Culture - Lab Collect; Future    Pseudogout of left knee       Primary osteoarthritis involving multiple joints    - traMADol (ULTRAM) 50 MG tablet; Take 1 tablet (50 mg) by mouth every 6 hours as needed for pain    Carotid atherosclerosis, unspecified laterality    - Lipid panel reflex to direct LDL Fasting; Future  - Hemoglobin A1c; Future  - T3, Free; Future    Weight gain  Gradual 5-10lbs over ten years / no cushingoid features  reassured her that with a normal TSH , thyroid is not the culprit   She would like to rule out other hormonal issues    As she is very active and tried to eat right   If cortisol is to be checked recommend 24 hr urine   - Cortisol Cortisone Free 24 Hour Urine; Future  - T3, Free; Future  - T4, free; Future    Acquired hypothyroidism    - TSH; Future    Osteoporosis, unspecified osteoporosis type, unspecified pathological fracture presence  Fosamax caused constipation.  Options is to do prolia, not treat and monitor or try boniva with metanmucil and se eif once a month might be less constipating . She would like to try boniva    - Vitamin D Deficiency; Future  - IBANdronate (BONIVA) 150 MG tablet; Take 1 tablet (150 mg) by mouth every 30 days    Prediabetes  Disucssed . Low processed carb diet   - Albumin Random Urine Quantitative with Creat Ratio; Future    SOB (shortness of breath) on exertion  Very active but noted more fatigue and SOB doing cardio than before . Given prior h/o stent over 10 years ago , reasonable to check stress test . Recommend holding metoprolol 24hrs before test   - NM MPI Treadmill; Future    Abnormal finding of blood chemistry, unspecified    - Hemoglobin A1c; Future    Otherwise doing well             BMI  Estimated body mass index is 31.33 kg/m  as calculated from the following:    Height as of this encounter: 1.524 m (5').    Weight as of this encounter: 72.8 kg (160 lb 6.4 oz).       Counseling  Appropriate preventive services were discussed with this patient, including applicable screening as appropriate for fall prevention, nutrition, physical activity, Tobacco-use cessation, weight loss and cognition.  Checklist reviewing preventive services available has been given to the patient.  Reviewed patient's diet, addressing concerns and/or questions.   She is at risk for psychosocial distress and has been provided with information to reduce risk.   Patient reported safety concerns were addressed today.The patient was provided with written information regarding signs of hearing loss.   Information on urinary incontinence and treatment options given to patient.   I have reviewed Opioid Use Disorder and Substance Use Disorder risk factors and made any needed referrals.           Sean Swann is a 73 year old, presenting for the following:  Annual Visit (Annual wellness )  Finding herself more irritable , constipation , gaining belly fat wondering if  thyroid right eye twitching   ? Sleep study declines   Voiding issues some incontinence had to wear a pad     Lab Results   Component Value Date    A1C 5.7 10/19/2023    A1C 5.6 04/18/2022    A1C 5.7 02/07/2022    A1C 5.7 01/29/2020    A1C 5.7 01/16/2019       TSH   Date Value Ref Range Status   10/19/2023 1.66 0.30 - 4.20 uIU/mL Final   06/17/2022 2.47 0.30 - 5.00 uIU/mL Final       Current Outpatient Medications   Medication Sig Dispense Refill    acetaminophen (TYLENOL) 500 MG tablet Take 1,000 mg by mouth 3 times daily       ASPIRIN (ASPIR-81 ORAL) [ASPIRIN (ASPIR-81 ORAL)] Take 1 tablet by mouth daily.      calcium citrate-vitamin D (CITRACAL) 315-200 MG-UNIT TABS per tablet Take 1 tablet by mouth daily      estradiol (ESTRACE) 0.1 MG/GM vaginal cream INSERT 1 GRAM INTO THE VAGINA DAILY. ONE GRAM EVERY MON, WED, FRIDAY 42.5 g 3    famotidine (PEPCID) 20 MG tablet [FAMOTIDINE (PEPCID) 20 MG TABLET] Take 20 mg by mouth 2 (two) times a day as needed for heartburn.      gabapentin (NEURONTIN) 300 MG capsule TAKE 1 CAPSULE BY MOUTH EVERYDAY AT BEDTIME 90 capsule 3    levothyroxine (SYNTHROID/LEVOTHROID) 88 MCG tablet TAKE 1 TABLET BY MOUTH EVERY DAY 90 tablet 0    lisinopril (ZESTRIL) 10 MG tablet Take 1 tablet (10 mg) by mouth daily 90 tablet 3    metoprolol succinate ER (TOPROL XL) 50 MG 24 hr tablet Take 1 tablet (50 mg) by mouth daily 90 tablet 3    multivitamin, therapeutic (THERA-VIT) TABS tablet Take 1 tablet by mouth daily      rosuvastatin (CRESTOR) 40 MG tablet Take 1 tablet (40 mg) by mouth daily 90 tablet 3    traMADol (ULTRAM) 50 MG tablet Take 1 tablet (50 mg) by mouth every 6 hours as needed for pain 20 tablet 3    Vitamin D, Cholecalciferol, 25 MCG (1000 UT) TABS Take 1 tablet by mouth daily       No current facility-administered medications for this visit.           5/10/2024     8:31 AM   Additional Questions   Roomed by Gee Higgins   Accompanied by N/A         Health Care Directive  Patient does not  have a Health Care Directive or Living Will: Patient states has Advance Directive and will bring in a copy to clinic.    HPI              5/3/2024   General Health   How would you rate your overall physical health? Good   Feel stress (tense, anxious, or unable to sleep) Only a little   (!) STRESS CONCERN      5/3/2024   Nutrition   Diet: Regular (no restrictions)         5/3/2024   Exercise   Days per week of moderate/strenous exercise 5 days   Average minutes spent exercising at this level 50 min         5/3/2024   Social Factors   Frequency of gathering with friends or relatives More than three times a week   Worry food won't last until get money to buy more No   Food not last or not have enough money for food? No   Do you have housing?  Yes   Are you worried about losing your housing? No   Lack of transportation? No   Unable to get utilities (heat,electricity)? No         5/10/2024   Fall Risk   Fallen 2 or more times in the past year? No   Trouble with walking or balance? No          5/3/2024   Activities of Daily Living- Home Safety   Needs help with the following daily activites None of the above   Safety concerns in the home No grab bars in the bathroom         5/3/2024   Dental   Dentist two times every year? Yes         5/3/2024   Hearing Screening   Hearing concerns? (!) I NEED TO ASK PEOPLE TO SPEAK UP OR REPEAT THEMSELVES.    (!) IT'S HARD TO FOLLOW A CONVERSATION IN A NOISY RESTAURANT OR CROWDED ROOM.    (!) TROUBLE UNDERSTANDING SOFT OR WHISPERED SPEECH.         5/3/2024   Driving Risk Screening   Patient/family members have concerns about driving No         5/3/2024   General Alertness/Fatigue Screening   Have you been more tired than usual lately? No         5/3/2024   Urinary Incontinence Screening   Bothered by leaking urine in past 6 months Yes         5/3/2024   TB Screening   Were you born outside of the [place]? No       Today's PHQ-9 Score:       5/10/2024     8:26 AM   PHQ-9 SCORE   PHQ-9 Total  Score MyChart 0   PHQ-9 Total Score 0         5/3/2024   Substance Use   Alcohol more than 3/day or more than 7/wk No   Do you have a current opioid prescription? (!) YES   How severe/bad is pain from 1 to 10? 8/10   Do you use any other substances recreationally? No          No data to display              Low Risk (0-3)  Moderate Risk (4-7)  High Risk (>8)  Social History     Tobacco Use    Smoking status: Former     Current packs/day: 0.00     Average packs/day: 0.5 packs/day for 20.0 years (10.0 ttl pk-yrs)     Types: Cigarettes     Start date: 1965     Quit date: 1985     Years since quittin.3    Smokeless tobacco: Never   Vaping Use    Vaping status: Never Used   Substance Use Topics    Alcohol use: Yes     Alcohol/week: 2.0 - 3.0 standard drinks of alcohol    Drug use: No           2023   LAST FHS-7 RESULTS   1st degree relative breast or ovarian cancer No   Any relative bilateral breast cancer No   Any male have breast cancer No   Any ONE woman have BOTH breast AND ovarian cancer No   Any woman with breast cancer before 50yrs No   2 or more relatives with breast AND/OR ovarian cancer No   2 or more relatives with breast AND/OR bowel cancer No            ASCVD Risk   The 10-year ASCVD risk score (Chela EMERSON, et al., 2019) is: 21.9%    Values used to calculate the score:      Age: 73 years      Sex: Female      Is Non- : No      Diabetic: No      Tobacco smoker: No      Systolic Blood Pressure: 132 mmHg      Is BP treated: Yes      HDL Cholesterol: 58 mg/dL      Total Cholesterol: 163 mg/dL            Reviewed and updated as needed this visit by Provider                      Current providers sharing in care for this patient include:  Patient Care Team:  Chiquita Muñoz MD as PCP - General (Internal Medicine)  Parish Méndez MD as Assigned PCP    The following health maintenance items are reviewed in Epic and correct as of today:  Health Maintenance    Topic Date Due    HEPATITIS A IMMUNIZATION (1 of 2 - Risk 2-dose series) Never done    COVID-19 Vaccine (7 - 2023-24 season) 01/18/2024    MEDICARE ANNUAL WELLNESS VISIT  05/10/2024    URINE DRUG SCREEN  05/10/2024    CONTROLLED SUBSTANCE AGREEMENT FOR CHRONIC PAIN MANAGEMENT  05/10/2024    LIPID  10/19/2024    TSH W/FREE T4 REFLEX  10/19/2024    ANNUAL REVIEW OF HM ORDERS  10/19/2024    HEMANT ASSESSMENT  05/10/2025    FALL RISK ASSESSMENT  05/10/2025    PHQ-9  05/10/2025    GLUCOSE  10/19/2026    ADVANCE CARE PLANNING  05/10/2028    COLORECTAL CANCER SCREENING  12/31/2028    DTAP/TDAP/TD IMMUNIZATION (3 - Td or Tdap) 10/01/2031    DEXA  09/14/2038    HEPATITIS C SCREENING  Completed    PHQ-2 (once per calendar year)  Completed    INFLUENZA VACCINE  Completed    Pneumococcal Vaccine: 65+ Years  Completed    ZOSTER IMMUNIZATION  Completed    RSV VACCINE (Pregnancy & 60+)  Completed    IPV IMMUNIZATION  Aged Out    HPV IMMUNIZATION  Aged Out    MENINGITIS IMMUNIZATION  Aged Out    RSV MONOCLONAL ANTIBODY  Aged Out    MAMMO SCREENING  Discontinued       Current Outpatient Medications   Medication Sig Dispense Refill    acetaminophen (TYLENOL) 500 MG tablet Take 1,000 mg by mouth 3 times daily       ASPIRIN (ASPIR-81 ORAL) [ASPIRIN (ASPIR-81 ORAL)] Take 1 tablet by mouth daily.      calcium citrate-vitamin D (CITRACAL) 315-200 MG-UNIT TABS per tablet Take 1 tablet by mouth daily      famotidine (PEPCID) 20 MG tablet [FAMOTIDINE (PEPCID) 20 MG TABLET] Take 20 mg by mouth 2 (two) times a day as needed for heartburn.      gabapentin (NEURONTIN) 300 MG capsule TAKE 1 CAPSULE BY MOUTH EVERYDAY AT BEDTIME 90 capsule 3      12 tablet 0    levothyroxine (SYNTHROID/LEVOTHROID) 88 MCG tablet TAKE 1 TABLET BY MOUTH EVERY DAY 90 tablet 0    lisinopril (ZESTRIL) 10 MG tablet Take 1 tablet (10 mg) by mouth daily 90 tablet 3    metoprolol succinate ER (TOPROL XL) 50 MG 24 hr tablet Take 1 tablet (50 mg) by mouth daily 90 tablet 3     multivitamin, therapeutic (THERA-VIT) TABS tablet Take 1 tablet by mouth daily        90 tablet 0    rosuvastatin (CRESTOR) 40 MG tablet Take 1 tablet (40 mg) by mouth daily 90 tablet 3    traMADol (ULTRAM) 50 MG tablet Take 1 tablet (50 mg) by mouth every 6 hours as needed for pain 20 tablet 3    Vitamin D, Cholecalciferol, 25 MCG (1000 UT) TABS Take 1 tablet by mouth daily       No current facility-administered medications for this visit.          Objective    Exam  /72 (BP Location: Left arm, Patient Position: Sitting, Cuff Size: Adult Regular)   Pulse 63   Temp 97.5  F (36.4  C) (Oral)   Resp 18   Ht 1.524 m (5')   Wt 72.8 kg (160 lb 6.4 oz)   LMP  (LMP Unknown)   SpO2 98%   BMI 31.33 kg/m     Estimated body mass index is 31.33 kg/m  as calculated from the following:    Height as of this encounter: 1.524 m (5').    Weight as of this encounter: 72.8 kg (160 lb 6.4 oz).    Physical Exam  GENERAL: alert and no distress  HENT: ear canals and TM's normal, nose and mouth without ulcers or lesions  NECK: no adenopathy, no asymmetry, masses, or scars  RESP: lungs clear to auscultation - no rales, rhonchi or wheezes  CV: regular rate and rhythm, normal S1 S2, no S3 or S4, no murmur, click or rub, no peripheral edema  ABDOMEN: soft, nontender, no hepatosplenomegaly, no masses and bowel sounds normal  MS: no gross musculoskeletal defects noted, no edema  multipleSK lesions       5/10/2024   Mini Cog   Clock Draw Score 2 Normal   3 Item Recall 3 objects recalled   Mini Cog Total Score 5              Signed Electronically by: Chiquita Muñoz MD    Answers submitted by the patient for this visit:  Patient Health Questionnaire (Submitted on 5/10/2024)  PHQ9 TOTAL SCORE: 0  HEMANT-7 (Submitted on 5/10/2024)  HEMANT 7 TOTAL SCORE: 1

## 2024-05-11 LAB
CHOLEST SERPL-MCNC: 149 MG/DL
CREAT UR-MCNC: 37.2 MG/DL
FASTING STATUS PATIENT QL REPORTED: YES
HDLC SERPL-MCNC: 57 MG/DL
LDLC SERPL CALC-MCNC: 71 MG/DL
MICROALBUMIN UR-MCNC: 15.4 MG/L
MICROALBUMIN/CREAT UR: 41.4 MG/G CR (ref 0–25)
NONHDLC SERPL-MCNC: 92 MG/DL
T3FREE SERPL-MCNC: 2.7 PG/ML (ref 2–4.4)
T4 FREE SERPL-MCNC: 1.33 NG/DL (ref 0.9–1.7)
TRIGL SERPL-MCNC: 107 MG/DL
TSH SERPL DL<=0.005 MIU/L-ACNC: 2.56 UIU/ML (ref 0.3–4.2)
VIT D+METAB SERPL-MCNC: 63 NG/ML (ref 20–50)

## 2024-05-13 PROCEDURE — 99000 SPECIMEN HANDLING OFFICE-LAB: CPT | Performed by: INTERNAL MEDICINE

## 2024-05-13 PROCEDURE — 82530 CORTISOL FREE: CPT | Mod: 90 | Performed by: INTERNAL MEDICINE

## 2024-05-13 PROCEDURE — 82542 COL CHROMOTOGRAPHY QUAL/QUAN: CPT | Mod: 90 | Performed by: INTERNAL MEDICINE

## 2024-05-14 ENCOUNTER — APPOINTMENT (OUTPATIENT)
Dept: LAB | Facility: CLINIC | Age: 74
End: 2024-05-14
Payer: COMMERCIAL

## 2024-05-15 ENCOUNTER — HOSPITAL ENCOUNTER (OUTPATIENT)
Dept: NUCLEAR MEDICINE | Facility: HOSPITAL | Age: 74
Discharge: HOME | End: 2024-05-15
Attending: INTERNAL MEDICINE
Payer: COMMERCIAL

## 2024-05-15 ENCOUNTER — HOSPITAL ENCOUNTER (OUTPATIENT)
Dept: CARDIOLOGY | Facility: HOSPITAL | Age: 74
Discharge: HOME | End: 2024-05-15
Attending: INTERNAL MEDICINE
Payer: COMMERCIAL

## 2024-05-15 DIAGNOSIS — R06.02 SOB (SHORTNESS OF BREATH) ON EXERTION: ICD-10-CM

## 2024-05-15 LAB
CV STRESS CURRENT BP HE: NORMAL
CV STRESS CURRENT HR HE: 101
CV STRESS CURRENT HR HE: 102
CV STRESS CURRENT HR HE: 102
CV STRESS CURRENT HR HE: 109
CV STRESS CURRENT HR HE: 110
CV STRESS CURRENT HR HE: 110
CV STRESS CURRENT HR HE: 113
CV STRESS CURRENT HR HE: 122
CV STRESS CURRENT HR HE: 129
CV STRESS CURRENT HR HE: 129
CV STRESS CURRENT HR HE: 131
CV STRESS CURRENT HR HE: 140
CV STRESS CURRENT HR HE: 140
CV STRESS CURRENT HR HE: 148
CV STRESS CURRENT HR HE: 72
CV STRESS CURRENT HR HE: 73
CV STRESS CURRENT HR HE: 76
CV STRESS CURRENT HR HE: 78
CV STRESS CURRENT HR HE: 82
CV STRESS CURRENT HR HE: 85
CV STRESS CURRENT HR HE: 86
CV STRESS CURRENT HR HE: 87
CV STRESS CURRENT HR HE: 88
CV STRESS CURRENT HR HE: 90
CV STRESS CURRENT HR HE: 97
CV STRESS CURRENT HR HE: 98
CV STRESS DEVIATION TIME HE: NORMAL
CV STRESS ECHO PERCENT HR HE: NORMAL
CV STRESS EXERCISE STAGE HE: NORMAL
CV STRESS EXERCISE STAGE REACHED HE: NORMAL
CV STRESS FINAL RESTING BP HE: NORMAL
CV STRESS FINAL RESTING HR HE: 87
CV STRESS MAX HR HE: 145
CV STRESS MAX TREADMILL GRADE HE: 12
CV STRESS MAX TREADMILL SPEED HE: 2.5
CV STRESS PEAK DIA BP HE: NORMAL
CV STRESS PEAK SYS BP HE: NORMAL
CV STRESS PHASE HE: NORMAL
CV STRESS PROTOCOL HE: NORMAL
CV STRESS RESTING PT POSITION HE: NORMAL
CV STRESS RESTING PT POSITION HE: NORMAL
CV STRESS ST DEVIATION AMOUNT HE: NORMAL
CV STRESS ST DEVIATION ELEVATION HE: NORMAL
CV STRESS ST EVELATION AMOUNT HE: NORMAL
CV STRESS TEST TYPE HE: NORMAL
CV STRESS TOTAL STAGE TIME MIN 1 HE: NORMAL
RATE PRESSURE PRODUCT: NORMAL
STRESS ECHO BASELINE DIASTOLIC HE: 58
STRESS ECHO BASELINE HR: 71
STRESS ECHO BASELINE SYSTOLIC BP: 121
STRESS ECHO CALCULATED PERCENT HR: 100 %
STRESS ECHO LAST STRESS DIASTOLIC BP: 72
STRESS ECHO LAST STRESS HR: 140
STRESS ECHO LAST STRESS SYSTOLIC BP: 182
STRESS ECHO POST ESTIMATED WORKLOAD: 7.1
STRESS ECHO POST EXERCISE DUR MIN: 5
STRESS ECHO POST EXERCISE DUR SEC: 59
STRESS ECHO TARGET HR: 145

## 2024-05-15 PROCEDURE — 78452 HT MUSCLE IMAGE SPECT MULT: CPT

## 2024-05-15 PROCEDURE — 343N000001 HC RX 343: Performed by: INTERNAL MEDICINE

## 2024-05-15 PROCEDURE — 93018 CV STRESS TEST I&R ONLY: CPT | Performed by: STUDENT IN AN ORGANIZED HEALTH CARE EDUCATION/TRAINING PROGRAM

## 2024-05-15 PROCEDURE — A9500 TC99M SESTAMIBI: HCPCS | Performed by: INTERNAL MEDICINE

## 2024-05-15 PROCEDURE — 93016 CV STRESS TEST SUPVJ ONLY: CPT | Performed by: INTERNAL MEDICINE

## 2024-05-15 PROCEDURE — 78452 HT MUSCLE IMAGE SPECT MULT: CPT | Mod: 26 | Performed by: STUDENT IN AN ORGANIZED HEALTH CARE EDUCATION/TRAINING PROGRAM

## 2024-05-15 PROCEDURE — 93017 CV STRESS TEST TRACING ONLY: CPT

## 2024-05-15 RX ADMIN — Medication 8.1 MILLICURIE: at 11:36

## 2024-05-15 RX ADMIN — Medication 30.7 MILLICURIE: at 12:44

## 2024-05-17 LAB
COLLECT DURATION TIME UR: 24 H
CORTIS 24H UR-MRATE: 34 MCG/24 H (ref 3.5–45)
CORTISONE 24H UR-MRATE: 107 MCG/24 H (ref 17–129)
SPECIMEN VOL 24H UR: 2600 ML

## 2024-05-21 ENCOUNTER — TRANSFERRED RECORDS (OUTPATIENT)
Dept: HEALTH INFORMATION MANAGEMENT | Facility: CLINIC | Age: 74
End: 2024-05-21
Payer: COMMERCIAL

## 2024-05-31 ENCOUNTER — LAB REQUISITION (OUTPATIENT)
Dept: LAB | Facility: CLINIC | Age: 74
End: 2024-05-31
Payer: COMMERCIAL

## 2024-05-31 DIAGNOSIS — N95.0 POSTMENOPAUSAL BLEEDING: ICD-10-CM

## 2024-05-31 PROCEDURE — 88305 TISSUE EXAM BY PATHOLOGIST: CPT | Mod: 26 | Performed by: PATHOLOGY

## 2024-05-31 PROCEDURE — 88305 TISSUE EXAM BY PATHOLOGIST: CPT | Mod: TC,ORL | Performed by: OBSTETRICS & GYNECOLOGY

## 2024-06-04 LAB
PATH REPORT.COMMENTS IMP SPEC: NORMAL
PATH REPORT.COMMENTS IMP SPEC: NORMAL
PATH REPORT.FINAL DX SPEC: NORMAL
PATH REPORT.GROSS SPEC: NORMAL
PATH REPORT.MICROSCOPIC SPEC OTHER STN: NORMAL
PATH REPORT.RELEVANT HX SPEC: NORMAL
PHOTO IMAGE: NORMAL

## 2024-07-13 DIAGNOSIS — E78.00 HYPERCHOLESTEROLEMIA: ICD-10-CM

## 2024-07-15 RX ORDER — ROSUVASTATIN CALCIUM 40 MG/1
40 TABLET, COATED ORAL DAILY
Qty: 90 TABLET | Refills: 3 | OUTPATIENT
Start: 2024-07-15

## 2024-07-29 DIAGNOSIS — E03.9 ACQUIRED HYPOTHYROIDISM: ICD-10-CM

## 2024-07-29 RX ORDER — LEVOTHYROXINE SODIUM 88 UG/1
88 TABLET ORAL DAILY
Qty: 90 TABLET | Refills: 2 | Status: SHIPPED | OUTPATIENT
Start: 2024-07-29

## 2024-08-01 DIAGNOSIS — M81.0 OSTEOPOROSIS, UNSPECIFIED OSTEOPOROSIS TYPE, UNSPECIFIED PATHOLOGICAL FRACTURE PRESENCE: ICD-10-CM

## 2024-08-01 DIAGNOSIS — E78.00 HYPERCHOLESTEROLEMIA: ICD-10-CM

## 2024-08-01 RX ORDER — ROSUVASTATIN CALCIUM 40 MG/1
40 TABLET, COATED ORAL DAILY
Qty: 90 TABLET | Refills: 3 | OUTPATIENT
Start: 2024-08-01

## 2024-08-01 RX ORDER — IBANDRONATE SODIUM 150 MG/1
1 TABLET, FILM COATED ORAL
Qty: 3 TABLET | Refills: 3 | OUTPATIENT
Start: 2024-08-01

## 2024-08-05 DIAGNOSIS — N39.41 URGENCY INCONTINENCE: ICD-10-CM

## 2024-08-05 RX ORDER — OXYBUTYNIN CHLORIDE 5 MG/1
5 TABLET, EXTENDED RELEASE ORAL DAILY
Qty: 90 TABLET | Refills: 2 | Status: SHIPPED | OUTPATIENT
Start: 2024-08-05

## 2024-08-15 ENCOUNTER — ANCILLARY PROCEDURE (OUTPATIENT)
Dept: MAMMOGRAPHY | Facility: CLINIC | Age: 74
End: 2024-08-15
Attending: INTERNAL MEDICINE
Payer: COMMERCIAL

## 2024-08-15 DIAGNOSIS — Z12.31 VISIT FOR SCREENING MAMMOGRAM: ICD-10-CM

## 2024-08-15 PROCEDURE — 77063 BREAST TOMOSYNTHESIS BI: CPT

## 2024-08-24 DIAGNOSIS — E03.9 ACQUIRED HYPOTHYROIDISM: ICD-10-CM

## 2024-08-24 DIAGNOSIS — E78.00 HYPERCHOLESTEROLEMIA: ICD-10-CM

## 2024-08-26 RX ORDER — METOPROLOL SUCCINATE 50 MG/1
50 TABLET, EXTENDED RELEASE ORAL DAILY
Qty: 90 TABLET | Refills: 3 | OUTPATIENT
Start: 2024-08-26

## 2024-08-26 RX ORDER — LISINOPRIL 10 MG/1
10 TABLET ORAL DAILY
Qty: 90 TABLET | Refills: 3 | OUTPATIENT
Start: 2024-08-26

## 2024-08-26 RX ORDER — ROSUVASTATIN CALCIUM 40 MG/1
40 TABLET, COATED ORAL DAILY
Qty: 90 TABLET | Refills: 2 | Status: SHIPPED | OUTPATIENT
Start: 2024-08-26 | End: 2024-09-06

## 2024-09-06 ENCOUNTER — TELEPHONE (OUTPATIENT)
Dept: INTERNAL MEDICINE | Facility: CLINIC | Age: 74
End: 2024-09-06
Payer: COMMERCIAL

## 2024-09-06 DIAGNOSIS — E78.00 HYPERCHOLESTEROLEMIA: ICD-10-CM

## 2024-09-06 RX ORDER — ROSUVASTATIN CALCIUM 40 MG/1
40 TABLET, COATED ORAL DAILY
Qty: 100 TABLET | Refills: 2 | Status: SHIPPED | OUTPATIENT
Start: 2024-09-06

## 2024-09-06 NOTE — TELEPHONE ENCOUNTER
Patient has [facility] coverage and with this insurance plan, the patient is eligible to receive certain prescriptions as a 100-day supply at the 90-day supply cost.      Prescriptions updated to 100-day supply per protocol: rosuvastatin      Jennifer Rodriguez, PharmD, [facility]  Population Health Pharmacist  [phone]

## 2024-09-20 DIAGNOSIS — E03.9 ACQUIRED HYPOTHYROIDISM: ICD-10-CM

## 2024-09-23 ENCOUNTER — MYC MEDICAL ADVICE (OUTPATIENT)
Dept: INTERNAL MEDICINE | Facility: CLINIC | Age: 74
End: 2024-09-23
Payer: COMMERCIAL

## 2024-09-23 DIAGNOSIS — N95.2 ATROPHIC VAGINITIS: ICD-10-CM

## 2024-09-23 RX ORDER — METOPROLOL SUCCINATE 50 MG/1
50 TABLET, EXTENDED RELEASE ORAL DAILY
Qty: 90 TABLET | Refills: 3 | OUTPATIENT
Start: 2024-09-23

## 2024-09-23 RX ORDER — LISINOPRIL 10 MG/1
10 TABLET ORAL DAILY
Qty: 90 TABLET | Refills: 3 | OUTPATIENT
Start: 2024-09-23

## 2024-09-25 ENCOUNTER — MYC MEDICAL ADVICE (OUTPATIENT)
Dept: INTERNAL MEDICINE | Facility: CLINIC | Age: 74
End: 2024-09-25
Payer: COMMERCIAL

## 2024-09-25 DIAGNOSIS — R05.9 COUGH, UNSPECIFIED TYPE: Primary | ICD-10-CM

## 2024-09-25 DIAGNOSIS — I65.23 BILATERAL CAROTID ARTERY STENOSIS: Primary | ICD-10-CM

## 2024-09-25 RX ORDER — ESTRADIOL 0.1 MG/G
CREAM VAGINAL
Qty: 42.5 G | Refills: 3 | Status: SHIPPED | OUTPATIENT
Start: 2024-09-25

## 2024-09-25 NOTE — TELEPHONE ENCOUNTER
Per reported symptoms onset date- patient on day 6 of symptoms and does not qualify for Paxlovid treatment.     Attempted to call pt, no answer (1/3). Left message requesting pt to call back clinic.

## 2024-09-26 RX ORDER — CODEINE PHOSPHATE/GUAIFENESIN 10-100MG/5
5 LIQUID (ML) ORAL EVERY 4 HOURS PRN
Qty: 237 ML | Refills: 0 | Status: SHIPPED | OUTPATIENT
Start: 2024-09-26

## 2024-09-26 RX ORDER — GUAIFENESIN/DEXTROMETHORPHAN 100-10MG/5
10 SYRUP ORAL EVERY 4 HOURS PRN
Qty: 236 ML | Refills: 2 | Status: SHIPPED | OUTPATIENT
Start: 2024-09-26

## 2024-10-02 ENCOUNTER — HOSPITAL ENCOUNTER (OUTPATIENT)
Dept: ULTRASOUND IMAGING | Facility: CLINIC | Age: 74
Discharge: HOME | End: 2024-10-02
Attending: INTERNAL MEDICINE | Admitting: INTERNAL MEDICINE
Payer: COMMERCIAL

## 2024-10-02 DIAGNOSIS — I65.23 BILATERAL CAROTID ARTERY STENOSIS: ICD-10-CM

## 2024-10-02 PROCEDURE — 93880 EXTRACRANIAL BILAT STUDY: CPT

## 2024-10-16 ENCOUNTER — LAB REQUISITION (OUTPATIENT)
Dept: LAB | Facility: CLINIC | Age: 74
End: 2024-10-16
Payer: COMMERCIAL

## 2024-10-16 DIAGNOSIS — R39.14 FEELING OF INCOMPLETE BLADDER EMPTYING: ICD-10-CM

## 2024-10-16 PROCEDURE — 87086 URINE CULTURE/COLONY COUNT: CPT | Mod: ORL | Performed by: OBSTETRICS & GYNECOLOGY

## 2024-10-18 LAB — BACTERIA UR CULT: NORMAL

## 2024-10-24 ENCOUNTER — TRANSFERRED RECORDS (OUTPATIENT)
Dept: HEALTH INFORMATION MANAGEMENT | Facility: CLINIC | Age: 74
End: 2024-10-24
Payer: COMMERCIAL

## 2024-10-30 DIAGNOSIS — E03.9 ACQUIRED HYPOTHYROIDISM: ICD-10-CM

## 2024-10-30 RX ORDER — LISINOPRIL 10 MG/1
10 TABLET ORAL DAILY
Qty: 90 TABLET | Refills: 3 | Status: SHIPPED | OUTPATIENT
Start: 2024-10-30

## 2024-10-30 RX ORDER — METOPROLOL SUCCINATE 50 MG/1
50 TABLET, EXTENDED RELEASE ORAL DAILY
Qty: 90 TABLET | Refills: 3 | Status: SHIPPED | OUTPATIENT
Start: 2024-10-30

## 2024-11-05 DIAGNOSIS — R80.9 MICROALBUMINURIA: Primary | ICD-10-CM

## 2024-11-06 ENCOUNTER — LAB (OUTPATIENT)
Dept: LAB | Facility: CLINIC | Age: 74
End: 2024-11-06
Payer: COMMERCIAL

## 2024-11-06 DIAGNOSIS — R80.9 MICROALBUMINURIA: ICD-10-CM

## 2024-11-06 LAB
ALBUMIN SERPL BCG-MCNC: 4.3 G/DL (ref 3.5–5.2)
ALBUMIN UR-MCNC: NEGATIVE MG/DL
ALP SERPL-CCNC: 37 U/L (ref 40–150)
ALT SERPL W P-5'-P-CCNC: 21 U/L (ref 0–50)
ANION GAP SERPL CALCULATED.3IONS-SCNC: 11 MMOL/L (ref 7–15)
APPEARANCE UR: CLEAR
AST SERPL W P-5'-P-CCNC: 26 U/L (ref 0–45)
BACTERIA #/AREA URNS HPF: ABNORMAL /HPF
BILIRUB SERPL-MCNC: 0.5 MG/DL
BILIRUB UR QL STRIP: NEGATIVE
BUN SERPL-MCNC: 15.2 MG/DL (ref 8–23)
CALCIUM SERPL-MCNC: 8.9 MG/DL (ref 8.8–10.4)
CHLORIDE SERPL-SCNC: 102 MMOL/L (ref 98–107)
COLOR UR AUTO: YELLOW
CREAT SERPL-MCNC: 0.72 MG/DL (ref 0.51–0.95)
EGFRCR SERPLBLD CKD-EPI 2021: 86 ML/MIN/1.73M2
GLUCOSE SERPL-MCNC: 101 MG/DL (ref 70–99)
GLUCOSE UR STRIP-MCNC: NEGATIVE MG/DL
HCO3 SERPL-SCNC: 25 MMOL/L (ref 22–29)
HGB UR QL STRIP: ABNORMAL
KETONES UR STRIP-MCNC: NEGATIVE MG/DL
LEUKOCYTE ESTERASE UR QL STRIP: NEGATIVE
NITRATE UR QL: NEGATIVE
PH UR STRIP: 6 [PH] (ref 5–8)
POTASSIUM SERPL-SCNC: 4.7 MMOL/L (ref 3.4–5.3)
PROT SERPL-MCNC: 6.6 G/DL (ref 6.4–8.3)
RBC #/AREA URNS AUTO: ABNORMAL /HPF
SODIUM SERPL-SCNC: 138 MMOL/L (ref 135–145)
SP GR UR STRIP: 1.01 (ref 1–1.03)
SQUAMOUS #/AREA URNS AUTO: ABNORMAL /LPF
UROBILINOGEN UR STRIP-ACNC: 0.2 E.U./DL
WBC #/AREA URNS AUTO: ABNORMAL /HPF

## 2024-11-06 PROCEDURE — 81001 URINALYSIS AUTO W/SCOPE: CPT

## 2024-11-06 PROCEDURE — 82043 UR ALBUMIN QUANTITATIVE: CPT

## 2024-11-06 PROCEDURE — 82570 ASSAY OF URINE CREATININE: CPT

## 2024-11-06 PROCEDURE — 36415 COLL VENOUS BLD VENIPUNCTURE: CPT

## 2024-11-06 PROCEDURE — 80053 COMPREHEN METABOLIC PANEL: CPT

## 2024-11-07 DIAGNOSIS — M15.0 PRIMARY OSTEOARTHRITIS INVOLVING MULTIPLE JOINTS: ICD-10-CM

## 2024-11-07 LAB
CREAT UR-MCNC: 43.7 MG/DL
MICROALBUMIN UR-MCNC: 38.7 MG/L
MICROALBUMIN/CREAT UR: 88.56 MG/G CR (ref 0–25)

## 2024-11-08 RX ORDER — TRAMADOL HYDROCHLORIDE 50 MG/1
50 TABLET ORAL EVERY 6 HOURS PRN
Qty: 20 TABLET | Refills: 3 | Status: SHIPPED | OUTPATIENT
Start: 2024-11-08

## 2024-11-11 ENCOUNTER — VIRTUAL VISIT (OUTPATIENT)
Dept: INTERNAL MEDICINE | Facility: CLINIC | Age: 74
End: 2024-11-11
Payer: COMMERCIAL

## 2024-11-11 ENCOUNTER — E-VISIT (OUTPATIENT)
Dept: INTERNAL MEDICINE | Facility: CLINIC | Age: 74
End: 2024-11-11
Payer: COMMERCIAL

## 2024-11-11 DIAGNOSIS — R79.9 ABNORMAL BLOOD CHEMISTRY: Primary | ICD-10-CM

## 2024-11-11 DIAGNOSIS — N06.9 ISOLATED PROTEINURIA WITH MORPHOLOGIC LESION: Primary | ICD-10-CM

## 2024-11-11 PROCEDURE — 99207 PR NON-BILLABLE SERV PER CHARTING: CPT | Performed by: INTERNAL MEDICINE

## 2024-11-11 PROCEDURE — 99442 PR PHYSICIAN TELEPHONE EVALUATION 11-20 MIN: CPT | Mod: 93 | Performed by: INTERNAL MEDICINE

## 2024-11-11 NOTE — PROGRESS NOTES
"Hue is a 74 year old who is being evaluated via a billable telephone visit.    What phone number would you like to be contacted at? [phone]   How would you like to obtain your AVS? [place]  Originating Location (pt. Location): Home    Distant Location (provider location):  On-site    Assessment & Plan     Isolated proteinuria with morphologic lesion  H/o vaginitis and urine showed persistent microalbuminuria   Very minimal hematuria    Did see urology who decided against a cystoscopy  Reassuring that creatinine is normal , renal US . She can repeat vaginal wet prep   Hypertensive kidney disease can cause microalbuminuria    Recommend 24 hour urine   - US Renal Complete Non-Vascular; Future  - Protein timed urine; Future  - UA Macroscopic with reflex to Microscopic and Culture - Lab Collect; Future  - Creatinine random urine; Future  - Wet prep - Clinic Collect; Future          BMI  Estimated body mass index is 31.33 kg/m  as calculated from the following:    Height as of 5/10/24: 1.524 m (5').    Weight as of 5/10/24: 72.8 kg (160 lb 6.4 oz).             Subjective   Hue is a 74 year old, presenting for the following health issues:  ?Quantification of urine protein excretion, as described above. (See 'Quantitative measurement' above.)  ?Measurement of the serum creatinine (with estimation of GFR).  ?A urine protein immunoelectrophoresis to evaluate for excretion of monoclonal light chains. If urine monoclonal light chains are present, the patient should be evaluated for m?elom?. (See \"Multiple myeloma: Clinical features, laboratory manifestations, and diagnosis\".)  ?A kidney ultrasound examination to rule out structural causes (eg, reflux nephropathy, polycystic kidney disease).  Follow Up (Review and discuss recent labs per PCP )      11/11/2024    11:58 AM   Additional Questions   Roomed by SAMANTHA Young   Accompanied by alone         11/11/2024    11:58 AM   Patient Reported Additional Medications "   Patient reports taking the following new medications none     History of Present Illness       Reason for visit:  Lab results follow up    She eats 2-3 servings of fruits and vegetables daily.She consumes 0 sweetened beverage(s) daily.   She is taking medications regularly.                   Objective           Vitals:  No vitals were obtained today due to virtual visit.    Physical Exam   General: Alert and no distress //Respiratory: No audible wheeze, cough, or shortness of breath // Psychiatric:  Appropriate affect, tone, and pace of words            Phone call duration: 15 minutes  Signed Electronically by: Chiquita Muñoz MD

## 2024-11-12 ENCOUNTER — HOSPITAL ENCOUNTER (OUTPATIENT)
Dept: ULTRASOUND IMAGING | Facility: CLINIC | Age: 74
Discharge: HOME | End: 2024-11-12
Attending: INTERNAL MEDICINE
Payer: COMMERCIAL

## 2024-11-12 ENCOUNTER — LAB (OUTPATIENT)
Dept: LAB | Facility: CLINIC | Age: 74
End: 2024-11-12
Attending: INTERNAL MEDICINE
Payer: COMMERCIAL

## 2024-11-12 DIAGNOSIS — N06.9 ISOLATED PROTEINURIA WITH MORPHOLOGIC LESION: ICD-10-CM

## 2024-11-12 LAB
ALBUMIN UR-MCNC: NEGATIVE MG/DL
AMORPH CRY #/AREA URNS HPF: ABNORMAL /HPF
APPEARANCE UR: ABNORMAL
BILIRUB UR QL STRIP: NEGATIVE
CLUE CELLS: ABNORMAL
COLOR UR AUTO: ABNORMAL
CREAT UR-MCNC: 71.1 MG/DL
GLUCOSE UR STRIP-MCNC: NEGATIVE MG/DL
HGB UR QL STRIP: ABNORMAL
KETONES UR STRIP-MCNC: NEGATIVE MG/DL
LEUKOCYTE ESTERASE UR QL STRIP: NEGATIVE
NITRATE UR QL: NEGATIVE
PH UR STRIP: 7 [PH] (ref 5–7)
RBC URINE: 1 /HPF
SP GR UR STRIP: 1.02 (ref 1–1.03)
TRICHOMONAS, WET PREP: ABNORMAL
UROBILINOGEN UR STRIP-MCNC: <2 MG/DL
WBC URINE: 1 /HPF
WBC'S/HIGH POWER FIELD, WET PREP: ABNORMAL
YEAST, WET PREP: ABNORMAL

## 2024-11-12 PROCEDURE — 81003 URINALYSIS AUTO W/O SCOPE: CPT

## 2024-11-12 PROCEDURE — 82570 ASSAY OF URINE CREATININE: CPT

## 2024-11-12 PROCEDURE — 76770 US EXAM ABDO BACK WALL COMP: CPT

## 2024-11-12 PROCEDURE — 87210 SMEAR WET MOUNT SALINE/INK: CPT

## 2024-11-13 DIAGNOSIS — N32.89 BLADDER MASS: Primary | ICD-10-CM

## 2024-11-18 ENCOUNTER — TRANSFERRED RECORDS (OUTPATIENT)
Dept: HEALTH INFORMATION MANAGEMENT | Facility: CLINIC | Age: 74
End: 2024-11-18
Payer: COMMERCIAL

## 2024-11-26 ENCOUNTER — TRANSFERRED RECORDS (OUTPATIENT)
Dept: HEALTH INFORMATION MANAGEMENT | Facility: CLINIC | Age: 74
End: 2024-11-26
Payer: COMMERCIAL

## 2024-12-09 ENCOUNTER — TELEPHONE (OUTPATIENT)
Dept: INTERNAL MEDICINE | Facility: CLINIC | Age: 74
End: 2024-12-09
Payer: COMMERCIAL

## 2024-12-09 NOTE — TELEPHONE ENCOUNTER
December 9, 2024    Patient's pre-operative physical documentation and labs have been completed by Dr. Muñoz.  The pre-operative paperwork was faxed to [facility] at [phone] per instructions from the surgeon.    Sully Snider

## 2024-12-25 DIAGNOSIS — M25.50 ARTHRALGIA, UNSPECIFIED JOINT: ICD-10-CM

## 2024-12-30 RX ORDER — GABAPENTIN 300 MG/1
300 CAPSULE ORAL AT BEDTIME
Qty: 90 CAPSULE | Refills: 3 | Status: SHIPPED | OUTPATIENT
Start: 2024-12-30

## 2025-02-01 DIAGNOSIS — M81.0 OSTEOPOROSIS, UNSPECIFIED OSTEOPOROSIS TYPE, UNSPECIFIED PATHOLOGICAL FRACTURE PRESENCE: ICD-10-CM

## 2025-02-02 ENCOUNTER — MYC REFILL (OUTPATIENT)
Dept: INTERNAL MEDICINE | Facility: CLINIC | Age: 75
End: 2025-02-02
Payer: COMMERCIAL

## 2025-02-02 DIAGNOSIS — N95.2 ATROPHIC VAGINITIS: ICD-10-CM

## 2025-02-03 RX ORDER — ESTRADIOL 0.1 MG/G
CREAM VAGINAL DAILY
Qty: 90 G | Refills: 11 | Status: SHIPPED | OUTPATIENT
Start: 2025-02-03

## 2025-02-03 RX ORDER — IBANDRONATE SODIUM 150 MG/1
1 TABLET, FILM COATED ORAL
Qty: 3 TABLET | Refills: 3 | Status: SHIPPED | OUTPATIENT
Start: 2025-02-03

## 2025-04-01 ENCOUNTER — TRANSFERRED RECORDS (OUTPATIENT)
Dept: HEALTH INFORMATION MANAGEMENT | Facility: CLINIC | Age: 75
End: 2025-04-01
Payer: COMMERCIAL

## 2025-04-02 DIAGNOSIS — M11.20 PSEUDOGOUT: Primary | ICD-10-CM

## 2025-04-02 RX ORDER — PREDNISONE 20 MG/1
20 TABLET ORAL DAILY
Qty: 5 TABLET | Refills: 1 | Status: SHIPPED | OUTPATIENT
Start: 2025-04-02

## 2025-04-17 ENCOUNTER — TRANSFERRED RECORDS (OUTPATIENT)
Dept: HEALTH INFORMATION MANAGEMENT | Facility: CLINIC | Age: 75
End: 2025-04-17
Payer: COMMERCIAL

## 2025-04-28 ENCOUNTER — TRANSFERRED RECORDS (OUTPATIENT)
Dept: HEALTH INFORMATION MANAGEMENT | Facility: CLINIC | Age: 75
End: 2025-04-28
Payer: COMMERCIAL

## 2025-05-04 ENCOUNTER — MYC MEDICAL ADVICE (OUTPATIENT)
Dept: INTERNAL MEDICINE | Facility: CLINIC | Age: 75
End: 2025-05-04
Payer: COMMERCIAL

## 2025-05-04 DIAGNOSIS — M79.605 PAIN IN BOTH LOWER EXTREMITIES: Primary | ICD-10-CM

## 2025-05-04 DIAGNOSIS — M79.604 PAIN IN BOTH LOWER EXTREMITIES: Primary | ICD-10-CM

## 2025-05-06 DIAGNOSIS — M79.605 BILATERAL LEG PAIN: Primary | ICD-10-CM

## 2025-05-06 DIAGNOSIS — M79.604 BILATERAL LEG PAIN: Primary | ICD-10-CM

## 2025-05-06 DIAGNOSIS — I75.023 ATHEROEMBOLISM OF BILATERAL LOWER EXTREMITIES (H): ICD-10-CM

## 2025-05-07 ENCOUNTER — PATIENT OUTREACH (OUTPATIENT)
Dept: CARE COORDINATION | Facility: CLINIC | Age: 75
End: 2025-05-07
Payer: COMMERCIAL

## 2025-05-14 ENCOUNTER — OFFICE VISIT (OUTPATIENT)
Dept: RHEUMATOLOGY | Facility: CLINIC | Age: 75
End: 2025-05-14
Attending: INTERNAL MEDICINE
Payer: COMMERCIAL

## 2025-05-14 VITALS
RESPIRATION RATE: 20 BRPM | WEIGHT: 159.4 LBS | BODY MASS INDEX: 28.53 KG/M2 | SYSTOLIC BLOOD PRESSURE: 126 MMHG | OXYGEN SATURATION: 96 % | HEART RATE: 82 BPM | DIASTOLIC BLOOD PRESSURE: 60 MMHG

## 2025-05-14 DIAGNOSIS — M15.0 PRIMARY OSTEOARTHRITIS INVOLVING MULTIPLE JOINTS: Primary | ICD-10-CM

## 2025-05-14 DIAGNOSIS — M25.50 POLYARTHRALGIA: ICD-10-CM

## 2025-05-14 DIAGNOSIS — M79.605 BILATERAL LEG PAIN: ICD-10-CM

## 2025-05-14 DIAGNOSIS — C67.9 UROTHELIAL CARCINOMA OF BLADDER (H): ICD-10-CM

## 2025-05-14 DIAGNOSIS — M79.604 BILATERAL LEG PAIN: ICD-10-CM

## 2025-05-14 PROCEDURE — 99204 OFFICE O/P NEW MOD 45 MIN: CPT | Performed by: INTERNAL MEDICINE

## 2025-05-14 PROCEDURE — G2211 COMPLEX E/M VISIT ADD ON: HCPCS | Performed by: INTERNAL MEDICINE

## 2025-05-14 PROCEDURE — 3078F DIAST BP <80 MM HG: CPT | Performed by: INTERNAL MEDICINE

## 2025-05-14 PROCEDURE — 1125F AMNT PAIN NOTED PAIN PRSNT: CPT | Performed by: INTERNAL MEDICINE

## 2025-05-14 PROCEDURE — 3074F SYST BP LT 130 MM HG: CPT | Performed by: INTERNAL MEDICINE

## 2025-05-14 RX ORDER — DULOXETIN HYDROCHLORIDE 20 MG/1
20 CAPSULE, DELAYED RELEASE ORAL DAILY
Qty: 30 CAPSULE | Refills: 2 | Status: SHIPPED | OUTPATIENT
Start: 2025-05-14 | End: 2025-06-13

## 2025-05-14 ASSESSMENT — PAIN SCALES - GENERAL: PAINLEVEL_OUTOF10: MILD PAIN (2)

## 2025-05-14 NOTE — PROGRESS NOTES
This document was created using a software with less than 100% fidelity, at times resulting in unintended, even erroneous syntax and grammar.  The reader is advised to keep this under consideration while reviewing, interpreting this note.      Rheumatology Consult Note      Xiomy Gan     YOB: 1950 Age: 74 year old    Date of visit: 5/14/25    PCP: Chiquita Muñoz    Chief Complaint   Patient presents with:  Consult      Assessment and Plan     Hue was seen today for consult.    Diagnoses and all orders for this visit:    Primary osteoarthritis involving multiple joints  -     DULoxetine (CYMBALTA) 20 MG capsule; Take 1 capsule (20 mg) by mouth daily.    Bilateral leg pain  -     Adult Rheumatology  Referral    Polyarthralgia  -     DULoxetine (CYMBALTA) 20 MG capsule; Take 1 capsule (20 mg) by mouth daily.    Urothelial carcinoma of bladder (H)           This patient presents with polyarthralgias.  She has osteoarthritis.  At 1 point she was thought to have pseudogout elsewhere.  Recently she has had more episodes of pain in her lower extremity joints, left side, hip and knee and then more severe on the right side.  She was given prednisone on both occasions.  The hip area pain was thought to be trochanteric bursitis per the orthopedic note in the chart.  She is going to consider taking duloxetine literature on weight is provided.  While she takes that she is aware not to take tramadol with it.  In the area that would need to to be kept under consideration is that in rare patient with BCG installation reactive type of arthropathy can develop.  She just finished the course of prednisone.  She has no features to suggest inflammatory joint disease.  This is discussed.  Will meet her in the next 2 to 3 months or sooner if needed.The longitudinal plan of care for the diagnosis(es)/condition(s) as documented were addressed during this visit. Due to the added complexity in care, I will  "continue to support Hue in the subsequent management and with ongoing continuity of care.      Return to clinic: 3 months      HPI   Xiomy Gan is a 74 year old female  is seen today for evaluation of polyarthralgias.  She is well-known to have osteoarthritis.  She at one point was told elsewhere that she may have \"pseudogout\" though the evidence there has not been obtainable in the chart.  She noted that more recently while she had been working with her primary physicians to manage her OA related symptoms and orthopedics where she had been getting corticosteroid injections, Synvisc and also tramadol she has managed quite well and staying active.  There were few trips, up earlier this year during 1 of these she started experiencing pain.  This was predominantly in her left leg.  This was quite severe.  She was seen in orthopedics.  Diagnosis of trochanteric bursitis is made she was given prednisone.  That she found quite helpful.   She had little or no pain in the proximal upper extremities very little on the opposite number in the lower extremity.  Then during the trip she had pain in her knees this time it was worse on the right side with radiation down the calf and up the thigh.  She has finished a course of prednisone few days ago.  She noted that having been found to have bladder cancer she was treated with BCG installation.  Her workup was undertaken with negative rheumatoid factor anti-CCP antibody RIGO and normal C-reactive protein and sedimentation rate.  Currently she feels that she is back to her normal self but wonders if she could do something else beyond what she has done in the past in orthopedics and tramadol.  She takes acetaminophen regularly.  We discussed options such as duloxetine literature on this is given.  Will start off with a very small dose given that she is on tramadol that she can withhold for now.  She is not a cigarette smoker.  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    Her comorbidities as noted and reviewed.       Active Problem List     Patient Active Problem List   Diagnosis    Essential hypertension    Diffuse Joint Pains (Arthralgias)    Cholelithiasis    Hypothyroidism    Vitamin D Deficiency    Hypercholesterolemia    Coronary Artery Disease    Carotid artery disease    Osteopenia    Osteoarthrosis Of Multiple Sites    Ganglion cyst of flexor tendon sheath of finger, right    Chondrocalcinosis    Greater trochanteric bursitis of both hips    Palpitations    Pelvic floor dysfunction    Mixed stress and urge urinary incontinence    F11.2 - Episodic opioid dependence (H)    Class 1 obesity in adult, unspecified BMI, unspecified obesity type, unspecified whether serious comorbidity present    Healthcare maintenance    Carotid atherosclerosis, unspecified laterality     Past Medical History     Past Medical History:   Diagnosis Date    Arthralgia     Breast cyst y-10    Carotid artery stenosis     Cholelithiasis     Chondrocalcinosis     Coronary artery disease     Hyperlipidemia     Hypertension     Hypothyroidism     Myocardial infarction (H)     Nocturia     Osteoarthrosis     Osteopenia     Palpitations 10/05/2017    Vitamin D deficiency      Past Surgical History     Past Surgical History:   Procedure Laterality Date    ABDOMEN SURGERY  Csections [age] and 78    APPENDECTOMY  2005    BREAST CYST ASPIRATION Left     long time ago    CARDIAC SURGERY  Stent 2006    COLONOSCOPY  2017    EYE SURGERY  2022    Cataract left    HC KNEE SCOPE, DIAGNOSTIC      Description: Arthroscopy Knee Right;  Recorded: 2009;    LAPAROSCOPIC CHOLECYSTECTOMY N/A 2015    Procedure: LAPAROSCOPIC CHOLECYSTECTOMY ;  Surgeon: Bao Hanley MD;  Location: [facility];  Service:     TUBAL LIGATION      ZC  DELIVERY ONLY      Description:  Section;  Recorded: 2012;  Comments: three  Annotations: Had 3 C-sections  "    Allergy     Allergies   Allergen Reactions    Wound Dressing Adhesive Rash     Per [facility]: \"H[employer] - [employer]\". Pt states Tegaderm      Current Medication List     Current Outpatient Medications   Medication Sig Dispense Refill    acetaminophen (TYLENOL) 500 MG tablet Take 1,000 mg by mouth 3 times daily       ASPIRIN (ASPIR-81 ORAL) [ASPIRIN (ASPIR-81 ORAL)] Take 1 tablet by mouth daily.      calcium citrate-vitamin D (CITRACAL) 315-200 MG-UNIT TABS per tablet Take 1 tablet by mouth daily      celecoxib (CELEBREX) 200 MG capsule       estradiol (ESTRACE) 0.1 MG/GM vaginal cream Place vaginally daily. INSERT 1 GRAM INTO THE VAGINA DAILY. ONE GRAM EVERY MON, WED, FRIDAY 90 g 11    famotidine (PEPCID) 20 MG tablet [FAMOTIDINE (PEPCID) 20 MG TABLET] Take 20 mg by mouth 2 (two) times a day as needed for heartburn.      gabapentin (NEURONTIN) 300 MG capsule TAKE 1 CAPSULE BY MOUTH EVERYDAY AT BEDTIME 90 capsule 3    GEMTESA 75 MG TABS tablet Take 75 mg by mouth daily.      IBANdronate (BONIVA) 150 MG tablet TAKE 1 TABLET BY MOUTH EVERY 30 DAYS 3 tablet 3    levothyroxine (SYNTHROID/LEVOTHROID) 88 MCG tablet TAKE 1 TABLET BY MOUTH EVERY DAY 90 tablet 2    lisinopril (ZESTRIL) 10 MG tablet TAKE 1 TABLET (10 MG) BY MOUTH DAILY. 90 tablet 3    metoprolol succinate ER (TOPROL XL) 50 MG 24 hr tablet TAKE 1 TABLET BY MOUTH EVERY DAY 90 tablet 3    multivitamin, therapeutic (THERA-VIT) TABS tablet Take 1 tablet by mouth daily      rosuvastatin (CRESTOR) 40 MG tablet Take 1 tablet (40 mg) by mouth daily. 100 tablet 2    traMADol (ULTRAM) 50 MG tablet Take 1 tablet (50 mg) by mouth every 6 hours as needed for pain. 20 tablet 3    Vitamin D, Cholecalciferol, 25 MCG (1000 UT) TABS Take 1 tablet by mouth daily       No current facility-administered medications for this visit.       No current facility-administered medications for this visit.        Family History     Family History   Problem Relation Age of " Onset    Diabetes Mother     Parkinsonism Father     Diabetes Sister     Heart Disease Sister     Hyperlipidemia Sister     Osteoporosis Sister     Heart Disease Brother     Hypertension Brother     Depression Brother     Anxiety Disorder Brother     Substance Abuse Brother     Arthritis Son     Psoriasis Son     Rheumatoid Arthritis Son     Breast Cancer No family hx of     Ovarian Cancer No family hx of     Colon Cancer No family hx of          Physical Exam     COMPREHENSIVE EXAMINATION:  Vitals:    05/14/25 1132   BP: 126/60   BP Location: Right arm   Patient Position: Sitting   Cuff Size: Adult Regular   Pulse: 82   Resp: 20   SpO2: 96%   Weight: 72.3 kg (159 lb 6.4 oz)     A well appearing alert oriented female. Vital data as noted above. Her eyes examined for inflammation/scleromalacia. ENT examined for oral mucositis, moisture, thrush, nasal deformity, external ear redness, deformity. Her neck is examined for suppleness and lymphadenopathy.  Cardiopulmonary examination without dyspnea at rest, use of accessory muscles of breathing, wheezing, edema, peripheral or central cyanosis.  Abdomen examined for softness, tenderness, obvious organomegaly.  Skin examined for heliotrope, malar area eruption, lupus pernio, periungual erythema, sclerodactyly, papules, erythema nodosum, purpura, nail pitting, onycholysis, and obvious psoriasis lesion. Neurological examination done for alertness, speech, facial symmetry,  tone and power in upper and lower extremities, and gait. The joint examination is performed for swelling, tenderness, warmth, erythema, and range of motion in the following joints: DIPs, PIPs, MCPs, wrists, first CMC's, elbows, shoulders, hips, knees, ankles, feet; spine for range of motion and paraspinal muscles for tenderness. The salient normal / abnormal findings are appended.  She has Heberden's, squaring the first CMC's, joint line tenderness of the knees.  She has minimal tenderness in the  trochanteric areas.  She has intact internal rotation and abduction of the shoulders.  There is no synovitis in any of the palpable joints.  She does not have dactylitis of the digits or toes.  Nails are painted.    Labs / Imaging (select studies)     Rheumatoid Factor   Date Value Ref Range Status   04/18/2025 <10 <14 IU/mL Final     Uric Acid   Date Value Ref Range Status   04/18/2025 5.3 2.4 - 5.7 mg/dL Final      Hemoglobin   Date Value Ref Range Status   12/06/2024 13.3 11.7 - 15.7 g/dL Final   11/09/2022 13.7 11.7 - 15.7 g/dL Final   02/07/2022 13.4 11.7 - 15.7 g/dL Final     Urea Nitrogen   Date Value Ref Range Status   12/06/2024 14.6 8.0 - 23.0 mg/dL Final   11/06/2024 15.2 8.0 - 23.0 mg/dL Final   10/19/2023 15.2 8.0 - 23.0 mg/dL Final   04/18/2022 17 8 - 28 mg/dL Final   02/07/2022 14 8 - 28 mg/dL Final   02/22/2021 11 8 - 28 mg/dL Final     Erythrocyte Sedimentation Rate   Date Value Ref Range Status   04/18/2025 9 0 - 30 mm/hr Final   11/09/2022 8 0 - 20 mm/hr Final     AST   Date Value Ref Range Status   11/06/2024 26 0 - 45 U/L Final   10/19/2023 43 0 - 45 U/L Final     Comment:     Reference intervals for this test were updated on 6/12/2023 to more accurately reflect our healthy population. There may be differences in the flagging of prior results with similar values performed with this method. Interpretation of those prior results can be made in the context of the updated reference intervals.   11/09/2022 28 10 - 35 U/L Final     Albumin   Date Value Ref Range Status   11/06/2024 4.3 3.5 - 5.2 g/dL Final   10/19/2023 4.9 3.5 - 5.2 g/dL Final   11/09/2022 4.6 3.5 - 5.2 g/dL Final   02/07/2022 4.2 3.5 - 5.0 g/dL Final   01/29/2020 4.1 3.5 - 5.0 g/dL Final   01/22/2019 3.9 3.5 - 5.0 g/dL Final     Alkaline Phosphatase   Date Value Ref Range Status   11/06/2024 37 (L) 40 - 150 U/L Final   10/19/2023 57 35 - 104 U/L Final   11/09/2022 53 35 - 104 U/L Final     ALT   Date Value Ref Range Status    11/06/2024 21 0 - 50 U/L Final   10/19/2023 43 0 - 50 U/L Final     Comment:     Reference intervals for this test were updated on 6/12/2023 to more accurately reflect our healthy population. There may be differences in the flagging of prior results with similar values performed with this method. Interpretation of those prior results can be made in the context of the updated reference intervals.     11/09/2022 25 10 - 35 U/L Final     Rheumatoid Factor   Date Value Ref Range Status   04/18/2025 <10 <14 IU/mL Final          Immunization History     Immunization History   Administered Date(s) Administered    COVID-19 12+ (Pfizer) 09/18/2023, 08/29/2024    COVID-19 Bivalent 12+ (Pfizer) 10/03/2022    COVID-19 MONOVALENT 12+ (Pfizer) 01/28/2021, 02/18/2021, 09/27/2021    COVID-19 Monovalent 12+ (Pfizer 2022) 07/09/2022    DT (PEDS <7y) 11/14/2002    Flu 65+ (Fluad) 09/27/2021    Flu, Unspecified 09/01/2010, 10/30/2012, 10/01/2013, 09/01/2016, 08/30/2019    Influenza (H1N1) 11/11/2009    Influenza (High Dose) Trivalent,PF (Fluzone) 08/29/2016, 09/08/2017, 09/11/2018, 08/29/2024    Influenza (IIV3) PF 09/01/2010, 09/14/2011, 10/30/2012, 10/01/2013    Influenza Vaccine 65+ (FLUAD) 09/27/2021, 09/18/2023    Influenza Vaccine 65+ (Fluzone HD) 08/13/2020, 10/03/2022    Pneumo Conj 13-V (2010&after) 02/24/2015    Pneumococcal 23 valent 08/16/2013, 01/24/2018    RSV Vaccine (Arexvy) 10/17/2023    TDAP (Adacel,Boostrix) 07/15/2011, 10/01/2021    Td (Adult), Adsorbed 11/14/2002    Td,adult,historic,unspecified 11/14/2002    Zoster Vaccine, Unspecified (historical) 07/11/2018    Zoster recombinant adjuvanted (Shingrix) 07/18/2018, 09/19/2018    Zoster vaccine, live 06/04/2009

## 2025-05-22 ENCOUNTER — MYC MEDICAL ADVICE (OUTPATIENT)
Dept: RHEUMATOLOGY | Facility: CLINIC | Age: 75
End: 2025-05-22
Payer: COMMERCIAL

## 2025-05-23 ENCOUNTER — TRANSFERRED RECORDS (OUTPATIENT)
Dept: HEALTH INFORMATION MANAGEMENT | Facility: CLINIC | Age: 75
End: 2025-05-23
Payer: COMMERCIAL

## 2025-05-27 DIAGNOSIS — M15.0 PRIMARY OSTEOARTHRITIS INVOLVING MULTIPLE JOINTS: ICD-10-CM

## 2025-05-28 RX ORDER — TRAMADOL HYDROCHLORIDE 50 MG/1
50 TABLET ORAL EVERY 6 HOURS PRN
Qty: 20 TABLET | Refills: 0 | Status: SHIPPED | OUTPATIENT
Start: 2025-05-28

## 2025-05-28 NOTE — CONFIDENTIAL NOTE
PDMP reviewed.  Last tramadol prescription March 25 for 20 tablets.  Prior to that December 25.  20 tablets seems to last 30 to 60 days    Also takes gabapentin    Use stable.  Okay to fill

## 2025-06-03 ENCOUNTER — HOSPITAL ENCOUNTER (OUTPATIENT)
Dept: ULTRASOUND IMAGING | Facility: CLINIC | Age: 75
Discharge: HOME | End: 2025-06-03
Attending: INTERNAL MEDICINE
Payer: COMMERCIAL

## 2025-06-03 DIAGNOSIS — I75.023 ATHEROEMBOLISM OF BILATERAL LOWER EXTREMITIES (H): ICD-10-CM

## 2025-06-03 DIAGNOSIS — M79.604 BILATERAL LEG PAIN: ICD-10-CM

## 2025-06-03 DIAGNOSIS — M79.605 BILATERAL LEG PAIN: ICD-10-CM

## 2025-06-03 PROCEDURE — 93924 LWR XTR VASC STDY BILAT: CPT

## 2025-06-04 ENCOUNTER — RESULTS FOLLOW-UP (OUTPATIENT)
Dept: INTERNAL MEDICINE | Facility: CLINIC | Age: 75
End: 2025-06-04

## 2025-06-04 SDOH — HEALTH STABILITY: PHYSICAL HEALTH: ON AVERAGE, HOW MANY DAYS PER WEEK DO YOU ENGAGE IN MODERATE TO STRENUOUS EXERCISE (LIKE A BRISK WALK)?: 5 DAYS

## 2025-06-04 SDOH — HEALTH STABILITY: PHYSICAL HEALTH: ON AVERAGE, HOW MANY MINUTES DO YOU ENGAGE IN EXERCISE AT THIS LEVEL?: 40 MIN

## 2025-06-04 ASSESSMENT — SOCIAL DETERMINANTS OF HEALTH (SDOH): HOW OFTEN DO YOU GET TOGETHER WITH FRIENDS OR RELATIVES?: MORE THAN THREE TIMES A WEEK

## 2025-06-09 ENCOUNTER — OFFICE VISIT (OUTPATIENT)
Dept: INTERNAL MEDICINE | Facility: CLINIC | Age: 75
End: 2025-06-09
Payer: COMMERCIAL

## 2025-06-09 VITALS
SYSTOLIC BLOOD PRESSURE: 120 MMHG | RESPIRATION RATE: 15 BRPM | TEMPERATURE: 97.7 F | DIASTOLIC BLOOD PRESSURE: 73 MMHG | HEIGHT: 62 IN | OXYGEN SATURATION: 96 % | WEIGHT: 153 LBS | BODY MASS INDEX: 28.16 KG/M2 | HEART RATE: 71 BPM

## 2025-06-09 DIAGNOSIS — M25.50 ARTHRALGIA, UNSPECIFIED JOINT: ICD-10-CM

## 2025-06-09 DIAGNOSIS — Z13.6 SCREENING FOR CARDIOVASCULAR CONDITION: Primary | ICD-10-CM

## 2025-06-09 DIAGNOSIS — M81.0 OSTEOPOROSIS, UNSPECIFIED OSTEOPOROSIS TYPE, UNSPECIFIED PATHOLOGICAL FRACTURE PRESENCE: ICD-10-CM

## 2025-06-09 DIAGNOSIS — E78.00 HYPERCHOLESTEROLEMIA: ICD-10-CM

## 2025-06-09 DIAGNOSIS — M70.62 GREATER TROCHANTERIC BURSITIS OF BOTH HIPS: ICD-10-CM

## 2025-06-09 DIAGNOSIS — H35.30 MACULAR DEGENERATION (SENILE) OF RETINA: ICD-10-CM

## 2025-06-09 DIAGNOSIS — R00.2 PALPITATIONS: ICD-10-CM

## 2025-06-09 DIAGNOSIS — I77.9 BILATERAL CAROTID ARTERY DISEASE, UNSPECIFIED TYPE: ICD-10-CM

## 2025-06-09 DIAGNOSIS — R79.9 ABNORMAL BLOOD CHEMISTRY: ICD-10-CM

## 2025-06-09 DIAGNOSIS — M70.61 GREATER TROCHANTERIC BURSITIS OF BOTH HIPS: ICD-10-CM

## 2025-06-09 DIAGNOSIS — Z00.00 ENCOUNTER FOR MEDICARE ANNUAL WELLNESS EXAM: ICD-10-CM

## 2025-06-09 DIAGNOSIS — E03.9 ACQUIRED HYPOTHYROIDISM: ICD-10-CM

## 2025-06-09 DIAGNOSIS — E55.9 VITAMIN D DEFICIENCY: ICD-10-CM

## 2025-06-09 DIAGNOSIS — E78.2 MIXED HYPERLIPIDEMIA: ICD-10-CM

## 2025-06-09 DIAGNOSIS — F11.20 EPISODIC OPIOID DEPENDENCE (H): ICD-10-CM

## 2025-06-09 DIAGNOSIS — F11.90 CHRONIC, CONTINUOUS USE OF OPIOIDS: ICD-10-CM

## 2025-06-09 DIAGNOSIS — I10 ESSENTIAL HYPERTENSION: ICD-10-CM

## 2025-06-09 DIAGNOSIS — N39.46 MIXED STRESS AND URGE URINARY INCONTINENCE: ICD-10-CM

## 2025-06-09 PROBLEM — M62.89 PELVIC FLOOR DYSFUNCTION: Status: RESOLVED | Noted: 2022-03-17 | Resolved: 2025-06-09

## 2025-06-09 LAB
ALBUMIN SERPL BCG-MCNC: 4.7 G/DL (ref 3.5–5.2)
ALBUMIN UR-MCNC: NEGATIVE MG/DL
ALP SERPL-CCNC: 48 U/L (ref 40–150)
ALT SERPL W P-5'-P-CCNC: 22 U/L (ref 0–50)
ANION GAP SERPL CALCULATED.3IONS-SCNC: 10 MMOL/L (ref 7–15)
APPEARANCE UR: CLEAR
AST SERPL W P-5'-P-CCNC: 29 U/L (ref 0–45)
BASOPHILS # BLD AUTO: 0.1 10E3/UL (ref 0–0.2)
BASOPHILS NFR BLD AUTO: 1 %
BILIRUB SERPL-MCNC: 0.3 MG/DL
BILIRUB UR QL STRIP: NEGATIVE
BUN SERPL-MCNC: 11.8 MG/DL (ref 8–23)
CALCIUM SERPL-MCNC: 9.4 MG/DL (ref 8.8–10.4)
CHLORIDE SERPL-SCNC: 102 MMOL/L (ref 98–107)
CHOLEST SERPL-MCNC: 165 MG/DL
COLOR UR AUTO: YELLOW
CREAT SERPL-MCNC: 0.62 MG/DL (ref 0.51–0.95)
EGFRCR SERPLBLD CKD-EPI 2021: >90 ML/MIN/1.73M2
EOSINOPHIL # BLD AUTO: 0.2 10E3/UL (ref 0–0.7)
EOSINOPHIL NFR BLD AUTO: 2 %
ERYTHROCYTE [DISTWIDTH] IN BLOOD BY AUTOMATED COUNT: 11.8 % (ref 10–15)
EST. AVERAGE GLUCOSE BLD GHB EST-MCNC: 117 MG/DL
FASTING STATUS PATIENT QL REPORTED: NO
FASTING STATUS PATIENT QL REPORTED: NO
GLUCOSE SERPL-MCNC: 105 MG/DL (ref 70–99)
GLUCOSE UR STRIP-MCNC: NEGATIVE MG/DL
HBA1C MFR BLD: 5.7 % (ref 0–5.6)
HCO3 SERPL-SCNC: 27 MMOL/L (ref 22–29)
HCT VFR BLD AUTO: 42.6 % (ref 35–47)
HDLC SERPL-MCNC: 60 MG/DL
HGB BLD-MCNC: 14.3 G/DL (ref 11.7–15.7)
HGB UR QL STRIP: ABNORMAL
IMM GRANULOCYTES # BLD: 0 10E3/UL
IMM GRANULOCYTES NFR BLD: 0 %
KETONES UR STRIP-MCNC: NEGATIVE MG/DL
LDLC SERPL CALC-MCNC: 72 MG/DL
LEUKOCYTE ESTERASE UR QL STRIP: NEGATIVE
LYMPHOCYTES # BLD AUTO: 2.3 10E3/UL (ref 0.8–5.3)
LYMPHOCYTES NFR BLD AUTO: 32 %
MCH RBC QN AUTO: 32.6 PG (ref 26.5–33)
MCHC RBC AUTO-ENTMCNC: 33.6 G/DL (ref 31.5–36.5)
MCV RBC AUTO: 97 FL (ref 78–100)
MONOCYTES # BLD AUTO: 0.6 10E3/UL (ref 0–1.3)
MONOCYTES NFR BLD AUTO: 8 %
NEUTROPHILS # BLD AUTO: 4 10E3/UL (ref 1.6–8.3)
NEUTROPHILS NFR BLD AUTO: 57 %
NITRATE UR QL: NEGATIVE
NONHDLC SERPL-MCNC: 105 MG/DL
PH UR STRIP: 6.5 [PH] (ref 5–8)
PLATELET # BLD AUTO: 205 10E3/UL (ref 150–450)
POTASSIUM SERPL-SCNC: 4.5 MMOL/L (ref 3.4–5.3)
PROT SERPL-MCNC: 7.3 G/DL (ref 6.4–8.3)
RBC # BLD AUTO: 4.39 10E6/UL (ref 3.8–5.2)
RBC #/AREA URNS AUTO: NORMAL /HPF
SODIUM SERPL-SCNC: 139 MMOL/L (ref 135–145)
SP GR UR STRIP: 1.01 (ref 1–1.03)
TRIGL SERPL-MCNC: 164 MG/DL
TSH SERPL DL<=0.005 MIU/L-ACNC: 1.81 UIU/ML (ref 0.3–4.2)
UROBILINOGEN UR STRIP-ACNC: 0.2 E.U./DL
VIT D+METAB SERPL-MCNC: 58 NG/ML (ref 20–50)
WBC # BLD AUTO: 7.1 10E3/UL (ref 4–11)
WBC #/AREA URNS AUTO: NORMAL /HPF

## 2025-06-09 PROCEDURE — 80053 COMPREHEN METABOLIC PANEL: CPT | Performed by: INTERNAL MEDICINE

## 2025-06-09 PROCEDURE — 3044F HG A1C LEVEL LT 7.0%: CPT | Performed by: INTERNAL MEDICINE

## 2025-06-09 PROCEDURE — 85025 COMPLETE CBC W/AUTO DIFF WBC: CPT | Performed by: INTERNAL MEDICINE

## 2025-06-09 PROCEDURE — 3074F SYST BP LT 130 MM HG: CPT | Performed by: INTERNAL MEDICINE

## 2025-06-09 PROCEDURE — 36415 COLL VENOUS BLD VENIPUNCTURE: CPT | Performed by: INTERNAL MEDICINE

## 2025-06-09 PROCEDURE — G2211 COMPLEX E/M VISIT ADD ON: HCPCS | Performed by: INTERNAL MEDICINE

## 2025-06-09 PROCEDURE — 3048F LDL-C <100 MG/DL: CPT | Performed by: INTERNAL MEDICINE

## 2025-06-09 PROCEDURE — 82306 VITAMIN D 25 HYDROXY: CPT | Performed by: INTERNAL MEDICINE

## 2025-06-09 PROCEDURE — G0439 PPPS, SUBSEQ VISIT: HCPCS | Performed by: INTERNAL MEDICINE

## 2025-06-09 PROCEDURE — 3078F DIAST BP <80 MM HG: CPT | Performed by: INTERNAL MEDICINE

## 2025-06-09 PROCEDURE — 84443 ASSAY THYROID STIM HORMONE: CPT | Performed by: INTERNAL MEDICINE

## 2025-06-09 PROCEDURE — 1125F AMNT PAIN NOTED PAIN PRSNT: CPT | Performed by: INTERNAL MEDICINE

## 2025-06-09 PROCEDURE — 83036 HEMOGLOBIN GLYCOSYLATED A1C: CPT | Performed by: INTERNAL MEDICINE

## 2025-06-09 PROCEDURE — 81001 URINALYSIS AUTO W/SCOPE: CPT | Performed by: INTERNAL MEDICINE

## 2025-06-09 PROCEDURE — 99214 OFFICE O/P EST MOD 30 MIN: CPT | Mod: 25 | Performed by: INTERNAL MEDICINE

## 2025-06-09 PROCEDURE — 80061 LIPID PANEL: CPT | Performed by: INTERNAL MEDICINE

## 2025-06-09 RX ORDER — TRAMADOL HYDROCHLORIDE 50 MG/1
50 TABLET ORAL EVERY 6 HOURS PRN
Qty: 90 TABLET | Refills: 0 | Status: SHIPPED | OUTPATIENT
Start: 2025-06-09 | End: 2025-07-02

## 2025-06-09 RX ORDER — LISINOPRIL 10 MG/1
10 TABLET ORAL DAILY
Qty: 90 TABLET | Refills: 3 | Status: SHIPPED | OUTPATIENT
Start: 2025-06-09

## 2025-06-09 RX ORDER — LEVOTHYROXINE SODIUM 88 UG/1
88 TABLET ORAL DAILY
Qty: 90 TABLET | Refills: 2 | Status: SHIPPED | OUTPATIENT
Start: 2025-06-09

## 2025-06-09 RX ORDER — GABAPENTIN 300 MG/1
300 CAPSULE ORAL AT BEDTIME
Qty: 90 CAPSULE | Refills: 3 | Status: SHIPPED | OUTPATIENT
Start: 2025-06-09

## 2025-06-09 RX ORDER — ROSUVASTATIN CALCIUM 40 MG/1
40 TABLET, COATED ORAL DAILY
Qty: 100 TABLET | Refills: 2 | Status: SHIPPED | OUTPATIENT
Start: 2025-06-09

## 2025-06-09 RX ORDER — METOPROLOL SUCCINATE 50 MG/1
50 TABLET, EXTENDED RELEASE ORAL DAILY
Qty: 90 TABLET | Refills: 3 | Status: SHIPPED | OUTPATIENT
Start: 2025-06-09

## 2025-06-09 ASSESSMENT — ANXIETY QUESTIONNAIRES
7. FEELING AFRAID AS IF SOMETHING AWFUL MIGHT HAPPEN: NOT AT ALL
3. WORRYING TOO MUCH ABOUT DIFFERENT THINGS: NOT AT ALL
2. NOT BEING ABLE TO STOP OR CONTROL WORRYING: NOT AT ALL
GAD7 TOTAL SCORE: 0
GAD7 TOTAL SCORE: 0
1. FEELING NERVOUS, ANXIOUS, OR ON EDGE: NOT AT ALL
5. BEING SO RESTLESS THAT IT IS HARD TO SIT STILL: NOT AT ALL
6. BECOMING EASILY ANNOYED OR IRRITABLE: NOT AT ALL
7. FEELING AFRAID AS IF SOMETHING AWFUL MIGHT HAPPEN: NOT AT ALL
4. TROUBLE RELAXING: NOT AT ALL
GAD7 TOTAL SCORE: 0

## 2025-06-09 ASSESSMENT — PAIN SCALES - GENERAL: PAINLEVEL_OUTOF10: MILD PAIN (2)

## 2025-06-09 NOTE — PATIENT INSTRUCTIONS
Colon 2017 hyperplastic polyp , next due in ten years   Mammo annual August   Dexa 2023 DUE 2026  YOU WILL GET A CALL ABOUT IV RECLAST    Patient Education   Preventive Care Advice   This is general advice given by our system to help you stay healthy. However, your care team may have specific advice just for you. Please talk to your care team about your preventive care needs.  Nutrition  Eat 5 or more servings of fruits and vegetables each day.  Try wheat bread, brown rice and whole grain pasta (instead of white bread, rice, and pasta).  Get enough calcium and vitamin D. Check the label on foods and aim for 100% of the RDA (recommended daily allowance).  Lifestyle  Exercise at least 150 minutes each week  (30 minutes a day, 5 days a week).  Do muscle strengthening activities 2 days a week. These help control your weight and prevent disease.  No smoking.  Wear sunscreen to prevent skin cancer.  Have a dental exam and cleaning every 6 months.  Yearly exams  See your health care team every year to talk about:  Any changes in your health.  Any medicines your care team has prescribed.  Preventive care, family planning, and ways to prevent chronic diseases.  Shots (vaccines)   HPV shots (up to age 26), if you've never had them before.  Hepatitis B shots (up to age 59), if you've never had them before.  COVID-19 shot: Get this shot when it's due.  Flu shot: Get a flu shot every year.  Tetanus shot: Get a tetanus shot every 10 years.  Pneumococcal, hepatitis A, and RSV shots: Ask your care team if you need these based on your risk.  Shingles shot (for age 50 and up)  General health tests  Diabetes screening:  Starting at age 35, Get screened for diabetes at least every 3 years.  If you are younger than age 35, ask your care team if you should be screened for diabetes.  Cholesterol test: At age 39, start having a cholesterol test every 5 years, or more often if advised.  Bone density scan (DEXA): At age 50, ask your care  team if you should have this scan for osteoporosis (brittle bones).  Hepatitis C: Get tested at least once in your life.  STIs (sexually transmitted infections)  Before age 24: Ask your care team if you should be screened for STIs.  After age 24: Get screened for STIs if you're at risk. You are at risk for STIs (including HIV) if:  You are sexually active with more than one person.  You don't use condoms every time.  You or a partner was diagnosed with a sexually transmitted infection.  If you are at risk for HIV, ask about PrEP medicine to prevent HIV.  Get tested for HIV at least once in your life, whether you are at risk for HIV or not.  Cancer screening tests  Cervical cancer screening: If you have a cervix, begin getting regular cervical cancer screening tests starting at age 21.  Breast cancer scan (mammogram): If you've ever had breasts, begin having regular mammograms starting at age 40. This is a scan to check for breast cancer.  Colon cancer screening: It is important to start screening for colon cancer at age 45.  Have a colonoscopy test every 10 years (or more often if you're at risk) Or, ask your provider about stool tests like a FIT test every year or Cologuard test every 3 years.  To learn more about your testing options, visit:   .  For help making a decision, visit:   https://bit.ly/pu92799.  Prostate cancer screening test: If you have a prostate, ask your care team if a prostate cancer screening test (PSA) at age 55 is right for you.  Lung cancer screening: If you are a current or former smoker ages 50 to 80, ask your care team if ongoing lung cancer screenings are right for you.  For informational purposes only. Not to replace the advice of your health care provider. Copyright   2023 [employer]. All rights reserved. Clinically reviewed by the [facility]. [employer] 737484 - REV 01/24.  Hearing Loss: Care Instructions  Overview     Hearing loss is a sudden  or slow decrease in how well you hear. It can range from slight to profound. Permanent hearing loss can occur with aging. It also can happen when you are exposed long-term to loud noise. Examples include listening to loud music, riding motorcycles, or being around other loud machines.  Hearing loss can affect your work and home life. It can make you feel lonely or depressed. You may feel that you have lost your independence. But hearing aids and other devices can help you hear better and feel connected to others.  Follow-up care is a key part of your treatment and safety. Be sure to make and go to all appointments, and call your doctor if you are having problems. It's also a good idea to know your test results and keep a list of the medicines you take.  How can you care for yourself at home?  Avoid loud noises whenever possible. This helps keep your hearing from getting worse.  Always wear hearing protection around loud noises.  Wear a hearing aid as directed.  A professional can help you pick a hearing aid that will work best for you.  You can also get hearing aids over the counter for mild to moderate hearing loss.  Have hearing tests as your doctor suggests. They can show whether your hearing has changed. Your hearing aid may need to be adjusted.  Use other devices as needed. These may include:  Telephone amplifiers and hearing aids that can connect to a television, stereo, radio, or microphone.  Devices that use lights or vibrations. These alert you to the doorbell, a ringing telephone, or a baby monitor.  Television closed-captioning. This shows the words at the bottom of the screen. Most new TVs can do this.  TTY (text telephone). This lets you type messages back and forth on the telephone instead of talking or listening. These devices are also called TDD. When messages are typed on the keyboard, they are sent over the phone line to a receiving TTY. The message is shown on a monitor.  Use text messaging,  "social media, and email if it is hard for you to communicate by telephone.  Try to learn a listening technique called speechreading. It is not lipreading. You pay attention to people's gestures, expressions, posture, and tone of voice. These clues can help you understand what a person is saying. Face the person you are talking to, and have them face you. Make sure the lighting is good. You need to see the other person's face clearly.  Think about counseling if you need help to adjust to your hearing loss.  When should you call for help?  Watch closely for changes in your health, and be sure to contact your doctor if:    You think your hearing is getting worse.     You have new symptoms, such as dizziness or nausea.   Where can you learn more?  Go to https://www.[employer].net/patiented  Enter R798 in the search box to learn more about \"Hearing Loss: Care Instructions.\"  Current as of: October 27, 2024  Content Version: 14.4    9195-2570 [employer].   Care instructions adapted under license by your healthcare professional. If you have questions about a medical condition or this instruction, always ask your healthcare professional. [employer] disclaims any warranty or liability for your use of this information.    Bladder Training: Care Instructions  Your Care Instructions     Bladder training is used to treat urge incontinence and stress incontinence. Urge incontinence means that the need to urinate comes on so fast that you can't get to a toilet in time. Stress incontinence means that you leak urine because of pressure on your bladder. For example, it may happen when you laugh, cough, or lift something heavy.  Bladder training can increase how long you can wait before you have to urinate. It can also help your bladder hold more urine. And it can give you better control over the urge to urinate.  It is important to remember that bladder training takes a few weeks to a few months to make a " difference. You may not see results right away, but don't give up.  Follow-up care is a key part of your treatment and safety. Be sure to make and go to all appointments, and call your doctor if you are having problems. It's also a good idea to know your test results and keep a list of the medicines you take.  How can you care for yourself at home?  Work with your doctor to come up with a bladder training program that is right for you. You may use one or more of the following methods.  Delayed urination  In the beginning, try to keep from urinating for 5 minutes after you first feel the need to go.  While you wait, take deep, slow breaths to relax. Kegel exercises can also help you delay the need to go to the bathroom.  After some practice, when you can easily wait 5 minutes to urinate, try to wait 10 minutes before you urinate.  Slowly increase the waiting period until you are able to control when you have to urinate.  Scheduled urination  Empty your bladder when you first wake up in the morning.  Schedule times throughout the day when you will urinate.  Start by going to the bathroom every hour, even if you don't need to go.  Slowly increase the time between trips to the bathroom.  When you have found a schedule that works well for you, keep doing it.  If you wake up during the night and have to urinate, do it. Apply your schedule to waking hours only.  Kegel exercises  These tighten and strengthen pelvic muscles, which can help you control the flow of urine. (If doing these exercises causes pain, stop doing them and talk with your doctor.) To do Kegel exercises:  Squeeze your muscles as if you were trying not to pass gas. Or squeeze your muscles as if you were stopping the flow of urine. Your belly, legs, and buttocks shouldn't move.  Hold the squeeze for 3 seconds, then relax for 5 to 10 seconds.  Start with 3 seconds, then add 1 second each week until you are able to squeeze for 10 seconds.  Repeat the exercise  "10 times a session. Do 3 to 8 sessions a day.  When should you call for help?  Watch closely for changes in your health, and be sure to contact your doctor if:    Your incontinence is getting worse.     You do not get better as expected.   Where can you learn more?  Go to https://www.[employer].net/patiented  Enter V684 in the search box to learn more about \"Bladder Training: Care Instructions.\"  Current as of: April 30, 2024  Content Version: 14.4    2561-9498 [employer].   Care instructions adapted under license by your healthcare professional. If you have questions about a medical condition or this instruction, always ask your healthcare professional. [employer] disclaims any warranty or liability for your use of this information.    Chronic Pain: Care Instructions  Your Care Instructions     Chronic pain is pain that lasts a long time (months or even years) and may or may not have a clear cause. It is different from acute pain, which usually does have a clear cause--like an injury or illness--and gets better over time. Chronic pain:  Lasts over time but may vary from day to day.  Does not go away despite efforts to end it.  May disrupt your sleep and lead to fatigue.  May cause depression or anxiety.  May make your muscles tense, causing more pain.  Can disrupt your work, hobbies, home life, and relationships with friends and family.  Chronic pain is a very real condition. It is not just in your head. Treatment can help and usually includes several methods used together, such as medicines, physical therapy, exercise, and other treatments. Learning how to relax and changing negative thought patterns can also help you cope.  Chronic pain is complex. Taking an active role in your treatment will help you better manage your pain. Tell your doctor if you have trouble dealing with your pain. You may have to try several things before you find what works best for you.  Follow-up care is a key part " of your treatment and safety. Be sure to make and go to all appointments, and call your doctor if you are having problems. It's also a good idea to know your test results and keep a list of the medicines you take.  How can you care for yourself at home?  Pace yourself. Break up large jobs into smaller tasks. Save harder tasks for days when you have less pain, or go back and forth between hard tasks and easier ones. Take rest breaks.  Relax, and reduce stress. Relaxation techniques such as deep breathing or meditation can help.  Keep moving. Gentle, daily exercise can help reduce pain over the long run. Try low- or no-impact exercises such as walking, swimming, and stationary biking. Do stretches to stay flexible.  Try heat, cold packs, and massage.  Get enough sleep. Chronic pain can make you tired and drain your energy. Talk with your doctor if you have trouble sleeping because of pain.  Think positive. Your thoughts can affect your pain level. Do things that you enjoy to distract yourself when you have pain instead of focusing on the pain. See a movie, read a book, listen to music, or spend time with a friend.  If you think you are depressed, talk to your doctor about treatment.  Keep a daily pain diary. Record how your moods, thoughts, sleep patterns, activities, and medicine affect your pain. You may find that your pain is worse during or after certain activities or when you are feeling a certain emotion. Having a record of your pain can help you and your doctor find the best ways to treat your pain.  Take pain medicines exactly as directed.  If the doctor gave you a prescription medicine for pain, take it as prescribed.  If you are not taking a prescription pain medicine, ask your doctor if you can take an over-the-counter medicine.  Reducing constipation caused by pain medicine  Talk to your doctor about a laxative. If a laxative doesn't work, your doctor may suggest a prescription medicine.  Include fruits,  "vegetables, beans, and whole grains in your diet each day. These foods are high in fiber.  If your doctor recommends it, get more exercise. Walking is a good choice. Bit by bit, increase the amount you walk every day. Try for at least 30 minutes on most days of the week.  Schedule time each day for a bowel movement. A daily routine may help. Take your time and do not strain when having a bowel movement.  When should you call for help?   Call your doctor now or seek immediate medical care if:    Your pain gets worse or is out of control.     You feel down or blue, or you do not enjoy things like you once did. You may be depressed, which is common in people with chronic pain. Depression can be treated.     You have vomiting or cramps for more than 2 hours.   Watch closely for changes in your health, and be sure to contact your doctor if:    You cannot sleep because of pain.     You are very worried or anxious about your pain.     You have trouble taking your pain medicine.     You have any concerns about your pain medicine.     You have trouble with bowel movements, such as:  No bowel movement in 3 days.  Blood in the anal area, in your stool, or on the toilet paper.  Diarrhea for more than 24 hours.   Where can you learn more?  Go to https://www.[employer].net/patiented  Enter N004 in the search box to learn more about \"Chronic Pain: Care Instructions.\"  Current as of: July 31, 2024  Content Version: 14.4    2830-2124 [employer].   Care instructions adapted under license by your healthcare professional. If you have questions about a medical condition or this instruction, always ask your healthcare professional. [employer] disclaims any warranty or liability for your use of this information.       "

## 2025-06-09 NOTE — PROGRESS NOTES
Preventive Care Visit  [facility]  Chiquita Muñoz MD, Internal Medicine  Jun 9, 2025      Assessment & Plan     Screening for cardiovascular condition    Episodic opioid dependence (H)  Very sparing use of tramadol.    Encounter for Medicare annual wellness exam      Greater trochanteric bursitis of both hips      Arthralgia, unspecified joint  She also uses gabapentin  - gabapentin (NEURONTIN) 300 MG capsule; Take 1 capsule (300 mg) by mouth at bedtime.    Acquired hypothyroidism  TSH   Date Value Ref Range Status   06/09/2025 1.81 0.30 - 4.20 uIU/mL Final   06/17/2022 2.47 0.30 - 5.00 uIU/mL Final     Thyroid test is normal  - metoprolol succinate ER (TOPROL XL) 50 MG 24 hr tablet; Take 1 tablet (50 mg) by mouth daily.  - lisinopril (ZESTRIL) 10 MG tablet; Take 1 tablet (10 mg) by mouth daily.  - levothyroxine (SYNTHROID/LEVOTHROID) 88 MCG tablet; Take 1 tablet (88 mcg) by mouth daily.    Essential hypertension  Blood pressure is in excellent control    Hypercholesterolemia  Cholesterol is in good control  - Lipid panel reflex to direct LDL Non-fasting; Future  - rosuvastatin (CRESTOR) 40 MG tablet; Take 1 tablet (40 mg) by mouth daily.  - TSH; Future  - Comprehensive metabolic panel; Future  - Lipid panel reflex to direct LDL Non-fasting  - TSH  - Comprehensive metabolic panel    Bilateral carotid artery disease, unspecified type  This is getting surveillance ultrasound done    Palpitations  Currently not active  Mixed stress and urge urinary incontinence      Chronic, continuous use of opioids  She is on [employer]  - traMADol (ULTRAM) 50 MG tablet; Take 1 tablet (50 mg) by mouth every 6 hours as needed for severe pain.    Osteoporosis, unspecified osteoporosis type, unspecified pathological fracture presence    She has a diagnosis of osteoporosis and has opted for IV Reclast she stopped taking the ibandronate due to constipation.  Mixed hyperlipidemia    - Lipid panel reflex to direct LDL  "Fasting; Future  - CBC with Platelets & Differential; Future  - UA Macroscopic with reflex to Microscopic and Culture - Lab Collect; Future  - CBC with Platelets & Differential  - UA Macroscopic with reflex to Microscopic and Culture - Lab Collect  - UA Microscopic with Reflex to Culture    Abnormal blood chemistry    - Hemoglobin A1c; Future  - Hemoglobin A1c    Vitamin D deficiency    - Vitamin D Deficiency; Future  - Vitamin D Deficiency    Macular degeneration (senile) of retina  Stable    Colon 2017 hyperplastic polyp , next due in ten years   Mammo annual August   Dexa 2023 DUE 2026  YOU WILL GET A CALL ABOUT IV RECLAST        no shortness of breath ,no  chest pain ,no  Falls, no urinary incontinence , no weight changes , sleep and moodhave been good . Independent in ADLS and IADLS   The longitudinal plan of care for the diagnosis(es)/condition(s) as documented were addressed during this visit. Due to the added complexity in care, I will continue to support Hue in the subsequent management and with ongoing continuity of care.    BMI  Estimated body mass index is 27.98 kg/m  as calculated from the following:    Height as of this encounter: 1.575 m (5' 2\").    Weight as of this encounter: 69.4 kg (153 lb).       Counseling  Appropriate preventive services were addressed with this patient via screening, questionnaire, or discussion as appropriate for fall prevention, nutrition, physical activity, Tobacco-use cessation, social engagement, weight loss and cognition.  Checklist reviewing preventive services available has been given to the patient.  Reviewed patient's diet, addressing concerns and/or questions.   The patient was provided with written information regarding signs of hearing loss.   Information on urinary incontinence and treatment options given to patient.   I have reviewed Opioid Use Disorder and Substance Use Disorder risk factors and made any needed referrals.           Subjective   Hue is a 74 " year old, presenting for the following:  Wellness Visit and Recheck Medication (PT REPORTS THAT SHE'S HERE TO COMPLETE HER ANNUAL WELLNESS EXAM, AND DISCUSS BOTH KNEES ISSUES.)        6/9/2025    12:39 PM   Additional Questions   Roomed by FREDDIE   Accompanied by ALONE         6/9/2025    12:39 PM   Patient Reported Additional Medications   Patient reports taking the following new medications NONE          HPI           Advance Care Planning    Discussed advance care planning with patient; however, patient declined at this time.        6/4/2025   General Health   How would you rate your overall physical health? Good   Feel stress (tense, anxious, or unable to sleep) Not at all         6/4/2025   Nutrition   Diet: Regular (no restrictions)         6/4/2025   Exercise   Days per week of moderate/strenous exercise 5 days   Average minutes spent exercising at this level 40 min         6/4/2025   Social Factors   Frequency of gathering with friends or relatives More than three times a week   Worry food won't last until get money to buy more No   Food not last or not have enough money for food? No   Do you have housing? (Housing is defined as stable permanent housing and does not include staying outside in a car, in a tent, in an abandoned building, in an overnight shelter, or couch-surfing.) No   Are you worried about losing your housing? No   Lack of transportation? No   Unable to get utilities (heat,electricity)? No   Want help with housing or utility concern? No   (!) HOUSING CONCERN PRESENT      6/4/2025   Fall Risk   Fallen 2 or more times in the past year? No   Trouble with walking or balance? No          6/4/2025   Activities of Daily Living- Home Safety   Needs help with the following daily activites None of the above   Safety concerns in the home None of the above         6/4/2025   Dental   Dentist two times every year? Yes         6/4/2025   Hearing Screening   Hearing concerns? (!) IT'S HARD TO FOLLOW A  CONVERSATION IN A NOISY RESTAURANT OR CROWDED ROOM.         2025   Driving Risk Screening   Patient/family members have concerns about driving No         2025   General Alertness/Fatigue Screening   Have you been more tired than usual lately? No         2025   Urinary Incontinence Screening   Bothered by leaking urine in past 6 months Yes         Today's PHQ-2 Score:       2025    12:24 PM   PHQ-2 (  [employer])   Q1: Little interest or pleasure in doing things 0   Q2: Feeling down, depressed or hopeless 0   PHQ-2 Score 0    Q1: Little interest or pleasure in doing things Not at all   Q2: Feeling down, depressed or hopeless Not at all   PHQ-2 Score 0       Patient-reported           2025   Substance Use   Alcohol more than 3/day or more than 7/wk No   Do you have a current opioid prescription? (!) YES   How severe/bad is pain from 1 to 10? 5/10   Do you use any other substances recreationally? No       Social History     Tobacco Use    Smoking status: Former     Current packs/day: 0.00     Average packs/day: 0.5 packs/day for 20.0 years (10.0 ttl pk-yrs)     Types: Cigarettes     Start date: 1965     Quit date: 1985     Years since quittin.4    Smokeless tobacco: Never   Vaping Use    Vaping status: Never Used   Substance Use Topics    Alcohol use: Yes     Alcohol/week: 2.0 - 3.0 standard drinks of alcohol    Drug use: No           8/15/2024   LAST FHS-7 RESULTS   1st degree relative breast or ovarian cancer No   Any relative bilateral breast cancer No   Any male have breast cancer No   Any ONE woman have BOTH breast AND ovarian cancer No   Any woman with breast cancer before 50yrs No   2 or more relatives with breast AND/OR ovarian cancer No   2 or more relatives with breast AND/OR bowel cancer No            ASCVD Risk   The ASCVD Risk score (Chela EMERSON, et al., 2019) failed to calculate for the following reasons:    Risk score cannot be calculated because patient has a  "medical history suggesting prior/existing ASCVD            Reviewed and updated as needed this visit by Provider                      Current providers sharing in care for this patient include:  Patient Care Team:  Chiquita Muñoz MD as PCP - General (Internal Medicine)  Chiquita Muñoz MD as Assigned PCP  Speedy Nick MD as MD (Urology)  Chiqiuta Muñoz MD (Internal Medicine)  Zack Coello MBBS as Assigned Rheumatology Provider    The following health maintenance items are reviewed in Epic and correct as of today:  Health Maintenance   Topic Date Due    COVID-19 VACCINE (8 - 2024-25 season) 02/28/2025    LIPID  05/10/2025    URINE DRUG SCREEN  05/10/2025    CONTROLLED SUBSTANCE AGREEMENT FOR CHRONIC PAIN MANAGEMENT  05/13/2025    MEDICARE ANNUAL WELLNESS VISIT  05/10/2025    ANNUAL REVIEW OF HM ORDERS  11/11/2025    BMP  12/06/2025    TSH W/FREE T4 REFLEX  04/18/2026    PHQ-9  04/18/2026    HEMANT ASSESSMENT  06/09/2026    FALL RISK ASSESSMENT  06/09/2026    DIABETES SCREENING  12/06/2027    ADVANCE CARE PLANNING  05/10/2029    DTAP/TDAP/TD VACCINE (3 - Td or Tdap) 10/01/2031    DEXA  09/14/2038    HEPATITIS C SCREENING  Completed    PHQ-2 (once per calendar year)  Completed    INFLUENZA VACCINE  Completed    PNEUMOCOCCAL VACCINE 50+ YEARS  Completed    ZOSTER VACCINE  Completed    RSV VACCINE  Completed    HPV VACCINE  Aged Out    MENINGITIS VACCINE  Aged Out    MAMMO SCREENING  Discontinued    COLORECTAL CANCER SCREENING  Discontinued    HEPATITIS A VACCINE  Discontinued            Objective    Exam  /73 (BP Location: Left arm, Patient Position: Sitting, Cuff Size: Adult Regular)   Pulse 71   Temp 97.7  F (36.5  C) (Tympanic)   Resp 15   Ht 1.575 m (5' 2\")   Wt 69.4 kg (153 lb)   LMP  (LMP Unknown)   SpO2 96%   Breastfeeding No   BMI 27.98 kg/m     Estimated body mass index is 27.98 kg/m  as calculated from the following:    Height as of this encounter: 1.575 m (5' 2\").    Weight as of this " encounter: 69.4 kg (153 lb).    Physical Exam  GENERAL: alert and no distress  NECK: no adenopathy, no asymmetry, masses, or scars  RESP: lungs clear to auscultation - no rales, rhonchi or wheezes  CV: regular rate and rhythm, normal S1 S2, no S3 or S4, no murmur, click or rub, no peripheral edema  ABDOMEN: soft, nontender, no hepatosplenomegaly, no masses and bowel sounds normal  MS: no gross musculoskeletal defects noted, no edema        6/9/2025   Mini Cog   Clock Draw Score 2 Normal   3 Item Recall 3 objects recalled   Mini Cog Total Score 5              Signed Electronically by: Chiquita Muñoz MD    Answers submitted by the patient for this visit:  Patient Health Questionnaire (G7) (Submitted on 6/9/2025)  HEMANT 7 TOTAL SCORE: 0

## 2025-06-11 ENCOUNTER — RESULTS FOLLOW-UP (OUTPATIENT)
Dept: INTERNAL MEDICINE | Facility: CLINIC | Age: 75
End: 2025-06-11

## 2025-06-11 RX ORDER — HEPARIN SODIUM,PORCINE 10 UNIT/ML
5-20 VIAL (ML) INTRAVENOUS DAILY PRN
OUTPATIENT
Start: 2025-06-18

## 2025-06-11 RX ORDER — HEPARIN SODIUM (PORCINE) LOCK FLUSH IV SOLN 100 UNIT/ML 100 UNIT/ML
5 SOLUTION INTRAVENOUS
OUTPATIENT
Start: 2025-06-18

## 2025-06-11 RX ORDER — MEPERIDINE HYDROCHLORIDE 25 MG/ML
25 INJECTION INTRAMUSCULAR; INTRAVENOUS; SUBCUTANEOUS
OUTPATIENT
Start: 2025-06-18

## 2025-06-11 RX ORDER — ALBUTEROL SULFATE 90 UG/1
1-2 INHALANT RESPIRATORY (INHALATION)
Start: 2025-06-18

## 2025-06-11 RX ORDER — ALBUTEROL SULFATE 0.83 MG/ML
2.5 SOLUTION RESPIRATORY (INHALATION)
OUTPATIENT
Start: 2025-06-18

## 2025-06-11 RX ORDER — DIPHENHYDRAMINE HYDROCHLORIDE 50 MG/ML
25 INJECTION, SOLUTION INTRAMUSCULAR; INTRAVENOUS
Start: 2025-06-18

## 2025-06-11 RX ORDER — ACETAMINOPHEN 325 MG/1
650 TABLET ORAL
OUTPATIENT
Start: 2025-06-18

## 2025-06-11 RX ORDER — DIPHENHYDRAMINE HYDROCHLORIDE 50 MG/ML
50 INJECTION, SOLUTION INTRAMUSCULAR; INTRAVENOUS
Start: 2025-06-18

## 2025-06-11 RX ORDER — EPINEPHRINE 1 MG/ML
0.3 INJECTION, SOLUTION, CONCENTRATE INTRAVENOUS EVERY 5 MIN PRN
OUTPATIENT
Start: 2025-06-18

## 2025-06-11 RX ORDER — METHYLPREDNISOLONE SODIUM SUCCINATE 40 MG/ML
40 INJECTION INTRAMUSCULAR; INTRAVENOUS
Start: 2025-06-18

## 2025-06-11 RX ORDER — ZOLEDRONIC ACID 0.05 MG/ML
5 INJECTION, SOLUTION INTRAVENOUS ONCE
Start: 2025-06-18

## 2025-06-23 ENCOUNTER — E-VISIT (OUTPATIENT)
Dept: INTERNAL MEDICINE | Facility: CLINIC | Age: 75
End: 2025-06-23
Payer: COMMERCIAL

## 2025-06-23 DIAGNOSIS — M79.605 PAIN IN BOTH LOWER EXTREMITIES: Primary | ICD-10-CM

## 2025-06-23 DIAGNOSIS — M79.604 PAIN IN BOTH LOWER EXTREMITIES: Primary | ICD-10-CM

## 2025-06-23 PROCEDURE — 99207 PR NON-BILLABLE SERV PER CHARTING: CPT | Performed by: INTERNAL MEDICINE

## 2025-06-24 ENCOUNTER — TELEPHONE (OUTPATIENT)
Dept: INTERNAL MEDICINE | Facility: CLINIC | Age: 75
End: 2025-06-24
Payer: COMMERCIAL

## 2025-06-24 NOTE — TELEPHONE ENCOUNTER
----- Message from Chiquita Muñoz sent at 6/24/2025  2:49 PM CDT -----  Can you please put her on as a virtual slot . She did an evisit which is not going to work for her questions . If none available this week let me know

## 2025-06-26 ENCOUNTER — VIRTUAL VISIT (OUTPATIENT)
Dept: INTERNAL MEDICINE | Facility: CLINIC | Age: 75
End: 2025-06-26
Payer: COMMERCIAL

## 2025-06-26 DIAGNOSIS — R93.6 ABNORMAL FINDINGS ON DIAGNOSTIC IMAGING OF LIMBS: ICD-10-CM

## 2025-06-26 DIAGNOSIS — G61.89 OTHER INFLAMMATORY POLYNEUROPATHIES: ICD-10-CM

## 2025-06-26 DIAGNOSIS — M54.42 CHRONIC BILATERAL LOW BACK PAIN WITH LEFT-SIDED SCIATICA: Primary | ICD-10-CM

## 2025-06-26 DIAGNOSIS — G89.29 CHRONIC BILATERAL LOW BACK PAIN WITH LEFT-SIDED SCIATICA: Primary | ICD-10-CM

## 2025-06-26 RX ORDER — MULTIVITAMIN,THERAPEUTIC
1 TABLET ORAL DAILY
COMMUNITY

## 2025-06-26 RX ORDER — ANTIOX #8/OM3/DHA/EPA/LUT/ZEAX 250-2.5 MG
1 CAPSULE ORAL DAILY
COMMUNITY
Start: 2025-05-23

## 2025-06-26 RX ORDER — GABAPENTIN 300 MG/1
CAPSULE ORAL
Qty: 145 CAPSULE | Refills: 0 | Status: SHIPPED | OUTPATIENT
Start: 2025-06-26 | End: 2025-07-26

## 2025-06-26 NOTE — PROGRESS NOTES
"Hue is a 74 year old who is being evaluated via a billable video visit.    How would you like to obtain your AVS? MyChart  If the video visit is dropped, the invitation should be resent by: Send to e-mail at: [email or web address]  Will anyone else be joining your video visit? No      Assessment & Plan     Chronic bilateral low back pain with left-sided sciatica    - MR Lumbar Spine w/o Contrast; Future  - Vitamin B12; Future  - Methylmalonic Acid; Future  - Folate; Future  - Lead Venous Blood Confirm; Future    Other inflammatory polyneuropathies    - Lead Venous Blood Confirm; Future    Abnormal findings on diagnostic imaging of limbs    - Lead Venous Blood Confirm; Future    Had a long discussion with patient differential diagnosis of lower extremity pain includes circulation problems, neuropathy, referred pain, venous insufficiency problems.  Arterial circulation problems were ruled out.  She does not seem to have significant venous insufficiency symptoms of edema or skin discoloration.  EMG study is still being awaited.  Neuropathy seems the most likely diagnosis.  Will check MRI scan of the lumbar spine even though she does not have classic low back pain.  Check for metabolic causes of neuropathy other than those already checked.  Meanwhile maximize treatment for the pain she is leery of incrementing gabapentin but that is the best treatment at this point and then we can switch to Lyrica if she does not improve with the maximum dose of gabapentin.    The patient indicates understanding of these issues and agrees with the plan.        BMI  Estimated body mass index is 27.98 kg/m  as calculated from the following:    Height as of 6/9/25: 1.575 m (5' 2\").    Weight as of 6/9/25: 69.4 kg (153 lb).             Subjective   Hue is a 74 year old, presenting for the following health issues:  Does come and go   Can do things round the house   Now cannot do golfing   Exercise does trigger   All depends on what " she did    80% sleeping ok   It can be burning   Feets are not involved   Feels in the calves and hips   She is worried about weight gain with gabapentin    Is on tyelenol three times a day , ceebrex does manage it   Tramadol does help   Pain (Pt reports BLE pain, sharp, aching, numbness for last 3 months)      6/26/2025     9:39 AM   Additional Questions   Roomed by Lesa JONES RN   Accompanied by self         6/26/2025     9:39 AM   Patient Reported Additional Medications   Patient reports taking the following new medications none     Video Start Time:     Pain    History of Present Illness       Reason for visit:  BLE pain    She eats 2-3 servings of fruits and vegetables daily.She consumes 0 sweetened beverage(s) daily.She exercises with enough effort to increase her heart rate 30 to 60 minutes per day.  She exercises with enough effort to increase her heart rate 4 days per week.   She is taking medications regularly.                    Objective           Vitals:  No vitals were obtained today due to virtual visit.    Physical Exam   GENERAL: alert and no distress  EYES: Eyes grossly normal to inspection.  No discharge or erythema, or obvious scleral/conjunctival abnormalities.  RESP: No audible wheeze, cough, or visible cyanosis.    SKIN: Visible skin clear. No significant rash, abnormal pigmentation or lesions.  NEURO: Cranial nerves grossly intact.  Mentation and speech appropriate for age.  PSYCH: Appropriate affect, tone, and pace of words          Video-Visit Details    Type of service:  Video Visit   Video End Time: duration 20 min  Originating Location (pt. Location):     Distant Location (provider location):  On-site  Platform used for Video Visit: Mag[employer]  Signed Electronically by: Chiquita Muñoz MD

## 2025-07-05 ENCOUNTER — RESULTS FOLLOW-UP (OUTPATIENT)
Dept: INTERNAL MEDICINE | Facility: CLINIC | Age: 75
End: 2025-07-05

## 2025-07-06 ENCOUNTER — HOSPITAL ENCOUNTER (OUTPATIENT)
Dept: MRI IMAGING | Facility: CLINIC | Age: 75
Discharge: HOME | End: 2025-07-06
Attending: INTERNAL MEDICINE | Admitting: INTERNAL MEDICINE
Payer: COMMERCIAL

## 2025-07-06 DIAGNOSIS — M54.42 CHRONIC BILATERAL LOW BACK PAIN WITH LEFT-SIDED SCIATICA: ICD-10-CM

## 2025-07-06 DIAGNOSIS — G89.29 CHRONIC BILATERAL LOW BACK PAIN WITH LEFT-SIDED SCIATICA: ICD-10-CM

## 2025-07-06 PROCEDURE — 72148 MRI LUMBAR SPINE W/O DYE: CPT

## 2025-07-08 ENCOUNTER — INFUSION THERAPY VISIT (OUTPATIENT)
Dept: INFUSION THERAPY | Facility: HOSPITAL | Age: 75
End: 2025-07-08
Attending: INTERNAL MEDICINE
Payer: COMMERCIAL

## 2025-07-08 VITALS
DIASTOLIC BLOOD PRESSURE: 64 MMHG | OXYGEN SATURATION: 95 % | TEMPERATURE: 98 F | SYSTOLIC BLOOD PRESSURE: 139 MMHG | RESPIRATION RATE: 16 BRPM | HEART RATE: 72 BPM

## 2025-07-08 DIAGNOSIS — M81.0 OSTEOPOROSIS, UNSPECIFIED OSTEOPOROSIS TYPE, UNSPECIFIED PATHOLOGICAL FRACTURE PRESENCE: Primary | ICD-10-CM

## 2025-07-08 PROCEDURE — 258N000003 HC RX IP 258 OP 636: Performed by: INTERNAL MEDICINE

## 2025-07-08 PROCEDURE — 96365 THER/PROPH/DIAG IV INF INIT: CPT

## 2025-07-08 PROCEDURE — 250N000011 HC RX IP 250 OP 636: Performed by: INTERNAL MEDICINE

## 2025-07-08 RX ORDER — ALBUTEROL SULFATE 0.83 MG/ML
2.5 SOLUTION RESPIRATORY (INHALATION)
OUTPATIENT
Start: 2026-07-08

## 2025-07-08 RX ORDER — ALBUTEROL SULFATE 90 UG/1
1-2 INHALANT RESPIRATORY (INHALATION)
Start: 2026-07-08

## 2025-07-08 RX ORDER — HEPARIN SODIUM,PORCINE 10 UNIT/ML
5-20 VIAL (ML) INTRAVENOUS DAILY PRN
Status: DISCONTINUED | OUTPATIENT
Start: 2025-07-08 | End: 2025-07-08 | Stop reason: HOSPADM

## 2025-07-08 RX ORDER — DIPHENHYDRAMINE HYDROCHLORIDE 50 MG/ML
50 INJECTION, SOLUTION INTRAMUSCULAR; INTRAVENOUS
Start: 2026-07-08

## 2025-07-08 RX ORDER — ZOLEDRONIC ACID 0.05 MG/ML
5 INJECTION, SOLUTION INTRAVENOUS ONCE
Start: 2026-07-08

## 2025-07-08 RX ORDER — METHYLPREDNISOLONE SODIUM SUCCINATE 40 MG/ML
40 INJECTION INTRAMUSCULAR; INTRAVENOUS
Start: 2026-07-08

## 2025-07-08 RX ORDER — MEPERIDINE HYDROCHLORIDE 50 MG/ML
25 INJECTION INTRAMUSCULAR; INTRAVENOUS; SUBCUTANEOUS
Status: DISCONTINUED | OUTPATIENT
Start: 2025-07-08 | End: 2025-07-08 | Stop reason: HOSPADM

## 2025-07-08 RX ORDER — HEPARIN SODIUM (PORCINE) LOCK FLUSH IV SOLN 100 UNIT/ML 100 UNIT/ML
5 SOLUTION INTRAVENOUS
Status: DISCONTINUED | OUTPATIENT
Start: 2025-07-08 | End: 2025-07-08 | Stop reason: HOSPADM

## 2025-07-08 RX ORDER — ALBUTEROL SULFATE 90 UG/1
1-2 INHALANT RESPIRATORY (INHALATION)
Status: DISCONTINUED | OUTPATIENT
Start: 2025-07-08 | End: 2025-07-08 | Stop reason: HOSPADM

## 2025-07-08 RX ORDER — HEPARIN SODIUM (PORCINE) LOCK FLUSH IV SOLN 100 UNIT/ML 100 UNIT/ML
5 SOLUTION INTRAVENOUS
OUTPATIENT
Start: 2026-07-08

## 2025-07-08 RX ORDER — DIPHENHYDRAMINE HYDROCHLORIDE 50 MG/ML
25 INJECTION, SOLUTION INTRAMUSCULAR; INTRAVENOUS
Start: 2026-07-08

## 2025-07-08 RX ORDER — HEPARIN SODIUM,PORCINE 10 UNIT/ML
5-20 VIAL (ML) INTRAVENOUS DAILY PRN
OUTPATIENT
Start: 2026-07-08

## 2025-07-08 RX ORDER — ZOLEDRONIC ACID 0.05 MG/ML
5 INJECTION, SOLUTION INTRAVENOUS ONCE
Status: COMPLETED | OUTPATIENT
Start: 2025-07-08 | End: 2025-07-08

## 2025-07-08 RX ORDER — MEPERIDINE HYDROCHLORIDE 50 MG/ML
25 INJECTION INTRAMUSCULAR; INTRAVENOUS; SUBCUTANEOUS
OUTPATIENT
Start: 2026-07-08

## 2025-07-08 RX ORDER — EPINEPHRINE 1 MG/ML
0.3 INJECTION, SOLUTION INTRAMUSCULAR; SUBCUTANEOUS EVERY 5 MIN PRN
OUTPATIENT
Start: 2026-07-08

## 2025-07-08 RX ORDER — ACETAMINOPHEN 325 MG/1
650 TABLET ORAL
OUTPATIENT
Start: 2026-07-08

## 2025-07-08 RX ORDER — METHYLPREDNISOLONE SODIUM SUCCINATE 40 MG/ML
40 INJECTION INTRAMUSCULAR; INTRAVENOUS
Status: DISCONTINUED | OUTPATIENT
Start: 2025-07-08 | End: 2025-07-08 | Stop reason: HOSPADM

## 2025-07-08 RX ORDER — DIPHENHYDRAMINE HYDROCHLORIDE 50 MG/ML
50 INJECTION, SOLUTION INTRAMUSCULAR; INTRAVENOUS
Status: DISCONTINUED | OUTPATIENT
Start: 2025-07-08 | End: 2025-07-08 | Stop reason: HOSPADM

## 2025-07-08 RX ORDER — ALBUTEROL SULFATE 0.83 MG/ML
2.5 SOLUTION RESPIRATORY (INHALATION)
Status: DISCONTINUED | OUTPATIENT
Start: 2025-07-08 | End: 2025-07-08 | Stop reason: HOSPADM

## 2025-07-08 RX ORDER — EPINEPHRINE 1 MG/ML
0.3 INJECTION, SOLUTION INTRAMUSCULAR; SUBCUTANEOUS EVERY 5 MIN PRN
Status: DISCONTINUED | OUTPATIENT
Start: 2025-07-08 | End: 2025-07-08 | Stop reason: HOSPADM

## 2025-07-08 RX ORDER — ACETAMINOPHEN 325 MG/1
650 TABLET ORAL
Status: DISCONTINUED | OUTPATIENT
Start: 2025-07-08 | End: 2025-07-08 | Stop reason: HOSPADM

## 2025-07-08 RX ORDER — DIPHENHYDRAMINE HYDROCHLORIDE 50 MG/ML
25 INJECTION, SOLUTION INTRAMUSCULAR; INTRAVENOUS
Status: DISCONTINUED | OUTPATIENT
Start: 2025-07-08 | End: 2025-07-08 | Stop reason: HOSPADM

## 2025-07-08 RX ADMIN — ZOLEDRONIC ACID 5 MG: 5 INJECTION INTRAVENOUS at 12:35

## 2025-07-08 RX ADMIN — SODIUM CHLORIDE 250 ML: 0.9 INJECTION, SOLUTION INTRAVENOUS at 12:33

## 2025-07-08 NOTE — PROGRESS NOTES
Infusion Nursing Note:  Xiomy Gan presents today for First Reclast.    Patient seen by provider today: No   present during visit today: Not Applicable.    Note: Reviewed plan of care including potentia side effects, importance of maintaining good po hydration, when and whom to call if issues.    Premeds Given: patient declined acetaminophen. Reviewed max daily dose of acetaminophen if needed.    Intravenous Access:  Peripheral IV placed.    Treatment Conditions:  Lab Results   Component Value Date     06/09/2025    POTASSIUM 4.5 06/09/2025    CR 0.62 06/09/2025    HEBER 9.4 06/09/2025    BILITOTAL 0.3 06/09/2025    ALBUMIN 4.7 06/09/2025    ALT 22 06/09/2025    AST 29 06/09/2025       Results reviewed, labs MET treatment parameters, ok to proceed with treatment.      Post Infusion Assessment:  Patient tolerated infusion without incident.  Blood return noted pre and post infusion.  Site patent and intact, free from redness, edema or discomfort.  Access discontinued per protocol by Susan MANRIQUEZ.       Discharge Plan:   Discharge instructions reviewed with: Patient.  Patient will return one year for next appointment.   Patient discharged in stable condition by Susan MANRIQUEZ, accompanied by: self.  Departure Mode: Ambulatory.      Argenis Robles RN

## 2025-07-16 ENCOUNTER — TRANSFERRED RECORDS (OUTPATIENT)
Dept: HEALTH INFORMATION MANAGEMENT | Facility: CLINIC | Age: 75
End: 2025-07-16
Payer: COMMERCIAL

## 2025-07-28 ENCOUNTER — PATIENT OUTREACH (OUTPATIENT)
Dept: CARE COORDINATION | Facility: CLINIC | Age: 75
End: 2025-07-28
Payer: COMMERCIAL

## 2025-08-18 ENCOUNTER — ANCILLARY PROCEDURE (OUTPATIENT)
Dept: MAMMOGRAPHY | Facility: CLINIC | Age: 75
End: 2025-08-18
Attending: INTERNAL MEDICINE
Payer: COMMERCIAL

## 2025-08-18 DIAGNOSIS — Z12.31 VISIT FOR SCREENING MAMMOGRAM: ICD-10-CM

## 2025-08-18 PROCEDURE — 77063 BREAST TOMOSYNTHESIS BI: CPT

## 2025-09-02 DIAGNOSIS — G89.29 OTHER CHRONIC PAIN: Primary | ICD-10-CM

## 2025-09-02 RX ORDER — PREGABALIN 150 MG/1
150 CAPSULE ORAL 2 TIMES DAILY
Qty: 60 CAPSULE | Refills: 1 | Status: SHIPPED | OUTPATIENT
Start: 2025-09-02